# Patient Record
Sex: MALE | Race: WHITE | NOT HISPANIC OR LATINO | URBAN - METROPOLITAN AREA
[De-identification: names, ages, dates, MRNs, and addresses within clinical notes are randomized per-mention and may not be internally consistent; named-entity substitution may affect disease eponyms.]

---

## 2021-10-17 ENCOUNTER — INPATIENT (INPATIENT)
Facility: HOSPITAL | Age: 61
LOS: 16 days | Discharge: HOME | DRG: 3 | End: 2021-11-03
Attending: SURGERY | Admitting: SURGERY
Payer: MEDICAID

## 2021-10-17 VITALS
HEART RATE: 81 BPM | SYSTOLIC BLOOD PRESSURE: 128 MMHG | TEMPERATURE: 98 F | OXYGEN SATURATION: 98 % | RESPIRATION RATE: 19 BRPM | DIASTOLIC BLOOD PRESSURE: 69 MMHG

## 2021-10-17 DIAGNOSIS — I60.9 NONTRAUMATIC SUBARACHNOID HEMORRHAGE, UNSPECIFIED: ICD-10-CM

## 2021-10-17 LAB
ALBUMIN SERPL ELPH-MCNC: 4.2 G/DL — SIGNIFICANT CHANGE UP (ref 3.3–5.2)
ALP SERPL-CCNC: 83 U/L — SIGNIFICANT CHANGE UP (ref 40–120)
ALT FLD-CCNC: 39 U/L — SIGNIFICANT CHANGE UP
AMPHET UR-MCNC: NEGATIVE — SIGNIFICANT CHANGE UP
ANION GAP SERPL CALC-SCNC: 16 MMOL/L — SIGNIFICANT CHANGE UP (ref 5–17)
APPEARANCE UR: ABNORMAL
APTT BLD: 26 SEC — LOW (ref 27.5–35.5)
AST SERPL-CCNC: 36 U/L — SIGNIFICANT CHANGE UP
BACTERIA # UR AUTO: NEGATIVE — SIGNIFICANT CHANGE UP
BARBITURATES UR SCN-MCNC: NEGATIVE — SIGNIFICANT CHANGE UP
BASOPHILS # BLD AUTO: 0.03 K/UL — SIGNIFICANT CHANGE UP (ref 0–0.2)
BASOPHILS NFR BLD AUTO: 0.4 % — SIGNIFICANT CHANGE UP (ref 0–2)
BENZODIAZ UR-MCNC: NEGATIVE — SIGNIFICANT CHANGE UP
BILIRUB SERPL-MCNC: 0.3 MG/DL — LOW (ref 0.4–2)
BILIRUB UR-MCNC: NEGATIVE — SIGNIFICANT CHANGE UP
BLD GP AB SCN SERPL QL: SIGNIFICANT CHANGE UP
BUN SERPL-MCNC: 15.4 MG/DL — SIGNIFICANT CHANGE UP (ref 8–20)
CALCIUM SERPL-MCNC: 8.9 MG/DL — SIGNIFICANT CHANGE UP (ref 8.6–10.2)
CHLORIDE SERPL-SCNC: 103 MMOL/L — SIGNIFICANT CHANGE UP (ref 98–107)
CO2 SERPL-SCNC: 20 MMOL/L — LOW (ref 22–29)
COCAINE METAB.OTHER UR-MCNC: NEGATIVE — SIGNIFICANT CHANGE UP
COLOR SPEC: SIGNIFICANT CHANGE UP
CREAT SERPL-MCNC: 1.09 MG/DL — SIGNIFICANT CHANGE UP (ref 0.5–1.3)
DIFF PNL FLD: ABNORMAL
EOSINOPHIL # BLD AUTO: 0.19 K/UL — SIGNIFICANT CHANGE UP (ref 0–0.5)
EOSINOPHIL NFR BLD AUTO: 2.6 % — SIGNIFICANT CHANGE UP (ref 0–6)
EPI CELLS # UR: SIGNIFICANT CHANGE UP
ETHANOL SERPL-MCNC: <10 MG/DL — SIGNIFICANT CHANGE UP (ref 0–9)
GLUCOSE SERPL-MCNC: 172 MG/DL — HIGH (ref 70–99)
GLUCOSE UR QL: NEGATIVE MG/DL — SIGNIFICANT CHANGE UP
HCT VFR BLD CALC: 40.3 % — SIGNIFICANT CHANGE UP (ref 39–50)
HGB BLD-MCNC: 13.6 G/DL — SIGNIFICANT CHANGE UP (ref 13–17)
IMM GRANULOCYTES NFR BLD AUTO: 0.6 % — SIGNIFICANT CHANGE UP (ref 0–1.5)
INR BLD: 1.02 RATIO — SIGNIFICANT CHANGE UP (ref 0.88–1.16)
KETONES UR-MCNC: NEGATIVE — SIGNIFICANT CHANGE UP
LEUKOCYTE ESTERASE UR-ACNC: NEGATIVE — SIGNIFICANT CHANGE UP
LIDOCAIN IGE QN: 56 U/L — HIGH (ref 22–51)
LYMPHOCYTES # BLD AUTO: 2.35 K/UL — SIGNIFICANT CHANGE UP (ref 1–3.3)
LYMPHOCYTES # BLD AUTO: 32.7 % — SIGNIFICANT CHANGE UP (ref 13–44)
MCHC RBC-ENTMCNC: 29.6 PG — SIGNIFICANT CHANGE UP (ref 27–34)
MCHC RBC-ENTMCNC: 33.7 GM/DL — SIGNIFICANT CHANGE UP (ref 32–36)
MCV RBC AUTO: 87.8 FL — SIGNIFICANT CHANGE UP (ref 80–100)
METHADONE UR-MCNC: NEGATIVE — SIGNIFICANT CHANGE UP
MONOCYTES # BLD AUTO: 0.48 K/UL — SIGNIFICANT CHANGE UP (ref 0–0.9)
MONOCYTES NFR BLD AUTO: 6.7 % — SIGNIFICANT CHANGE UP (ref 2–14)
NEUTROPHILS # BLD AUTO: 4.1 K/UL — SIGNIFICANT CHANGE UP (ref 1.8–7.4)
NEUTROPHILS NFR BLD AUTO: 57 % — SIGNIFICANT CHANGE UP (ref 43–77)
NITRITE UR-MCNC: NEGATIVE — SIGNIFICANT CHANGE UP
OPIATES UR-MCNC: NEGATIVE — SIGNIFICANT CHANGE UP
PCP SPEC-MCNC: SIGNIFICANT CHANGE UP
PCP UR-MCNC: NEGATIVE — SIGNIFICANT CHANGE UP
PH UR: 8 — SIGNIFICANT CHANGE UP (ref 5–8)
PLATELET # BLD AUTO: 259 K/UL — SIGNIFICANT CHANGE UP (ref 150–400)
POTASSIUM SERPL-MCNC: 3.5 MMOL/L — SIGNIFICANT CHANGE UP (ref 3.5–5.3)
POTASSIUM SERPL-SCNC: 3.5 MMOL/L — SIGNIFICANT CHANGE UP (ref 3.5–5.3)
PROT SERPL-MCNC: 7.1 G/DL — SIGNIFICANT CHANGE UP (ref 6.6–8.7)
PROT UR-MCNC: 30 MG/DL
PROTHROM AB SERPL-ACNC: 11.8 SEC — SIGNIFICANT CHANGE UP (ref 10.6–13.6)
RBC # BLD: 4.59 M/UL — SIGNIFICANT CHANGE UP (ref 4.2–5.8)
RBC # FLD: 11.9 % — SIGNIFICANT CHANGE UP (ref 10.3–14.5)
RBC CASTS # UR COMP ASSIST: >50 /HPF (ref 0–4)
SARS-COV-2 RNA SPEC QL NAA+PROBE: SIGNIFICANT CHANGE UP
SODIUM SERPL-SCNC: 139 MMOL/L — SIGNIFICANT CHANGE UP (ref 135–145)
SP GR SPEC: 1.01 — SIGNIFICANT CHANGE UP (ref 1.01–1.02)
THC UR QL: NEGATIVE — SIGNIFICANT CHANGE UP
UROBILINOGEN FLD QL: NEGATIVE MG/DL — SIGNIFICANT CHANGE UP
WBC # BLD: 7.19 K/UL — SIGNIFICANT CHANGE UP (ref 3.8–10.5)
WBC # FLD AUTO: 7.19 K/UL — SIGNIFICANT CHANGE UP (ref 3.8–10.5)
WBC UR QL: SIGNIFICANT CHANGE UP

## 2021-10-17 PROCEDURE — 71260 CT THORAX DX C+: CPT | Mod: 26,MA

## 2021-10-17 PROCEDURE — 74177 CT ABD & PELVIS W/CONTRAST: CPT | Mod: 26,MA

## 2021-10-17 PROCEDURE — 70498 CT ANGIOGRAPHY NECK: CPT | Mod: 26

## 2021-10-17 PROCEDURE — 73100 X-RAY EXAM OF WRIST: CPT | Mod: 26,LT

## 2021-10-17 PROCEDURE — 99223 1ST HOSP IP/OBS HIGH 75: CPT

## 2021-10-17 PROCEDURE — 93010 ELECTROCARDIOGRAM REPORT: CPT

## 2021-10-17 PROCEDURE — 72125 CT NECK SPINE W/O DYE: CPT | Mod: 26,MA

## 2021-10-17 PROCEDURE — 73110 X-RAY EXAM OF WRIST: CPT | Mod: 26,50,77

## 2021-10-17 PROCEDURE — 99291 CRITICAL CARE FIRST HOUR: CPT

## 2021-10-17 PROCEDURE — 99222 1ST HOSP IP/OBS MODERATE 55: CPT

## 2021-10-17 PROCEDURE — 71045 X-RAY EXAM CHEST 1 VIEW: CPT | Mod: 26

## 2021-10-17 PROCEDURE — 73590 X-RAY EXAM OF LOWER LEG: CPT | Mod: 26,50

## 2021-10-17 PROCEDURE — 73130 X-RAY EXAM OF HAND: CPT | Mod: 26,LT

## 2021-10-17 PROCEDURE — 99222 1ST HOSP IP/OBS MODERATE 55: CPT | Mod: GC

## 2021-10-17 PROCEDURE — 73090 X-RAY EXAM OF FOREARM: CPT | Mod: 26,50

## 2021-10-17 PROCEDURE — 70450 CT HEAD/BRAIN W/O DYE: CPT | Mod: 26,59,MA

## 2021-10-17 PROCEDURE — 70450 CT HEAD/BRAIN W/O DYE: CPT | Mod: 26,77,59

## 2021-10-17 PROCEDURE — 73030 X-RAY EXAM OF SHOULDER: CPT | Mod: 26,LT

## 2021-10-17 PROCEDURE — 73110 X-RAY EXAM OF WRIST: CPT | Mod: 26,RT

## 2021-10-17 PROCEDURE — 73562 X-RAY EXAM OF KNEE 3: CPT | Mod: 26,50

## 2021-10-17 PROCEDURE — 73552 X-RAY EXAM OF FEMUR 2/>: CPT | Mod: 26,50

## 2021-10-17 PROCEDURE — 99221 1ST HOSP IP/OBS SF/LOW 40: CPT

## 2021-10-17 PROCEDURE — 70496 CT ANGIOGRAPHY HEAD: CPT | Mod: 26

## 2021-10-17 PROCEDURE — 70486 CT MAXILLOFACIAL W/O DYE: CPT | Mod: 26,MD

## 2021-10-17 PROCEDURE — 73120 X-RAY EXAM OF HAND: CPT | Mod: 26,LT,59

## 2021-10-17 RX ORDER — SODIUM CHLORIDE 9 MG/ML
1000 INJECTION INTRAMUSCULAR; INTRAVENOUS; SUBCUTANEOUS
Refills: 0 | Status: DISCONTINUED | OUTPATIENT
Start: 2021-10-17 | End: 2021-10-21

## 2021-10-17 RX ORDER — LEVETIRACETAM 250 MG/1
1000 TABLET, FILM COATED ORAL
Refills: 0 | Status: DISCONTINUED | OUTPATIENT
Start: 2021-10-17 | End: 2021-10-17

## 2021-10-17 RX ORDER — LEVETIRACETAM 250 MG/1
1000 TABLET, FILM COATED ORAL ONCE
Refills: 0 | Status: COMPLETED | OUTPATIENT
Start: 2021-10-17 | End: 2021-10-17

## 2021-10-17 RX ORDER — CEFAZOLIN SODIUM 1 G
2000 VIAL (EA) INJECTION ONCE
Refills: 0 | Status: COMPLETED | OUTPATIENT
Start: 2021-10-17 | End: 2021-10-17

## 2021-10-17 RX ORDER — ACETAMINOPHEN 500 MG
975 TABLET ORAL EVERY 6 HOURS
Refills: 0 | Status: DISCONTINUED | OUTPATIENT
Start: 2021-10-17 | End: 2021-10-17

## 2021-10-17 RX ORDER — DEXAMETHASONE 0.5 MG/5ML
5 ELIXIR ORAL EVERY 8 HOURS
Refills: 0 | Status: DISCONTINUED | OUTPATIENT
Start: 2021-10-17 | End: 2021-10-21

## 2021-10-17 RX ORDER — DEXAMETHASONE 0.5 MG/5ML
4 ELIXIR ORAL EVERY 8 HOURS
Refills: 0 | Status: DISCONTINUED | OUTPATIENT
Start: 2021-10-17 | End: 2021-10-17

## 2021-10-17 RX ORDER — ACETAMINOPHEN 500 MG
1000 TABLET ORAL ONCE
Refills: 0 | Status: COMPLETED | OUTPATIENT
Start: 2021-10-17 | End: 2021-10-17

## 2021-10-17 RX ORDER — CHLORHEXIDINE GLUCONATE 213 G/1000ML
1 SOLUTION TOPICAL
Refills: 0 | Status: DISCONTINUED | OUTPATIENT
Start: 2021-10-17 | End: 2021-10-19

## 2021-10-17 RX ORDER — HYDROMORPHONE HYDROCHLORIDE 2 MG/ML
0.5 INJECTION INTRAMUSCULAR; INTRAVENOUS; SUBCUTANEOUS ONCE
Refills: 0 | Status: DISCONTINUED | OUTPATIENT
Start: 2021-10-17 | End: 2021-10-17

## 2021-10-17 RX ORDER — LEVETIRACETAM 250 MG/1
1000 TABLET, FILM COATED ORAL EVERY 12 HOURS
Refills: 0 | Status: DISCONTINUED | OUTPATIENT
Start: 2021-10-17 | End: 2021-10-18

## 2021-10-17 RX ORDER — HYDROMORPHONE HYDROCHLORIDE 2 MG/ML
0.5 INJECTION INTRAMUSCULAR; INTRAVENOUS; SUBCUTANEOUS
Refills: 0 | Status: DISCONTINUED | OUTPATIENT
Start: 2021-10-17 | End: 2021-10-19

## 2021-10-17 RX ORDER — INFLUENZA VIRUS VACCINE 15; 15; 15; 15 UG/.5ML; UG/.5ML; UG/.5ML; UG/.5ML
0.5 SUSPENSION INTRAMUSCULAR ONCE
Refills: 0 | Status: COMPLETED | OUTPATIENT
Start: 2021-10-17 | End: 2021-10-17

## 2021-10-17 RX ADMIN — HYDROMORPHONE HYDROCHLORIDE 0.5 MILLIGRAM(S): 2 INJECTION INTRAMUSCULAR; INTRAVENOUS; SUBCUTANEOUS at 20:48

## 2021-10-17 RX ADMIN — LEVETIRACETAM 400 MILLIGRAM(S): 250 TABLET, FILM COATED ORAL at 18:03

## 2021-10-17 RX ADMIN — Medication 400 MILLIGRAM(S): at 23:19

## 2021-10-17 RX ADMIN — Medication 1000 MILLIGRAM(S): at 18:33

## 2021-10-17 RX ADMIN — HYDROMORPHONE HYDROCHLORIDE 0.5 MILLIGRAM(S): 2 INJECTION INTRAMUSCULAR; INTRAVENOUS; SUBCUTANEOUS at 21:19

## 2021-10-17 RX ADMIN — HYDROMORPHONE HYDROCHLORIDE 0.5 MILLIGRAM(S): 2 INJECTION INTRAMUSCULAR; INTRAVENOUS; SUBCUTANEOUS at 14:35

## 2021-10-17 RX ADMIN — Medication 400 MILLIGRAM(S): at 18:03

## 2021-10-17 RX ADMIN — HYDROMORPHONE HYDROCHLORIDE 0.5 MILLIGRAM(S): 2 INJECTION INTRAMUSCULAR; INTRAVENOUS; SUBCUTANEOUS at 17:21

## 2021-10-17 RX ADMIN — Medication 1000 MILLIGRAM(S): at 23:55

## 2021-10-17 RX ADMIN — SODIUM CHLORIDE 100 MILLILITER(S): 9 INJECTION INTRAMUSCULAR; INTRAVENOUS; SUBCUTANEOUS at 17:20

## 2021-10-17 RX ADMIN — Medication 100 MILLIGRAM(S): at 13:18

## 2021-10-17 RX ADMIN — HYDROMORPHONE HYDROCHLORIDE 0.5 MILLIGRAM(S): 2 INJECTION INTRAMUSCULAR; INTRAVENOUS; SUBCUTANEOUS at 18:36

## 2021-10-17 RX ADMIN — SODIUM CHLORIDE 100 MILLILITER(S): 9 INJECTION INTRAMUSCULAR; INTRAVENOUS; SUBCUTANEOUS at 20:46

## 2021-10-17 RX ADMIN — Medication 5 MILLIGRAM(S): at 22:38

## 2021-10-17 RX ADMIN — HYDROMORPHONE HYDROCHLORIDE 0.5 MILLIGRAM(S): 2 INJECTION INTRAMUSCULAR; INTRAVENOUS; SUBCUTANEOUS at 14:20

## 2021-10-17 NOTE — CONSULT NOTE ADULT - ATTENDING COMMENTS
Orthopaedic Trauma Surgeon Addendum:    I have personally performed a face-to-face diagnostic evaluation on this patient.  I have reviewed the physician assistant note and agree with the history, exam, and plan of care, except as noted.    Patient has PC/PW acetabular fracture with mild displacement and pre-existing arthritis.  Given current head injury, will wait to discuss operative or non-operative management pending medical optimization.  No need for emergent fixation, may treat non-op if risks outweigh benefits. Ortho will continue to follow.    Hadley Lozoya MD  Orthopaedic Trauma Surgeon  [facility] [place]

## 2021-10-17 NOTE — ED PROVIDER NOTE - CARE PLAN
1 Principal Discharge DX:	Subarachnoid hemorrhage  Secondary Diagnosis:	Wrist fracture, bilateral  Secondary Diagnosis:	Acetabular fracture

## 2021-10-17 NOTE — PATIENT PROFILE ADULT - OVER THE PAST TWO WEEKS, HAVE YOU FELT LITTLE INTEREST OR PLEASURE IN DOING THINGS?
Courtesy call.  Patient reports that he is feeling drastically better.  Patient claims he was feeling better after he left here Sunday evening after his injection.  Patient advised to finish taking antibiotics.  Verbalizes understanding.  
yes

## 2021-10-17 NOTE — H&P ADULT - HISTORY OF PRESENT ILLNESS
TRAUMA SURGERY H&P    Admitting surgical team: Trauma Surgery  Admitting attending: Dr. Alvarez  Patient seen and examined: 10/172/2021 1130  HPI:    Patient is a 61yM, no pmhx, BIBA as trauma B s/p fall from ladder in a tree , hemodynamically stable en route, saturating 93% therefor placed on 2 L NC. Upon arrival to trauma bay, patient GCS 15, HD stable, c/o head and b/l arm pain. Primary survey intact, secondary survery pertinent findings include laceration to left forehead, b/l wrist deformities, b/l knee pain. Patient required mouth suctioning for blood causing him to cough/spit up.   Denies cp, sob, n/v/d, abd pain.     PRIMARY SURVEY:  A: Airway intact   B:  bilateral air entry,  CTAB, trachea in midline  C: central and peripheral pulses intact bilaterally   D: GCS 15 (4 5 6)   E: exposed in a private room in the ED in order to fully examine any injuries     TRAUMA SURGERY H&P    Admitting surgical team: Trauma Surgery  Admitting attending: Dr. Alvarez  Patient seen and examined: 10/172/2021 1130  HPI:    Patient is a 61yM, unknown pmhx, BIBA as trauma B s/p fall from ladder in a tree approx 25 feet , hemodynamically stable en route, saturating 93% therefor placed on 2 L NC. Upon arrival to trauma bay, patient GCS 14, HD stable, c/o head and b/l arm pain. Primary survey intact, secondary survery pertinent findings include laceration to left forehead, b/l wrist deformities, b/l knee pain. Patient required mouth suctioning for blood causing him to cough/spit up.   Denies cp, sob, n/v/d, abd pain.     PRIMARY SURVEY:  A: Airway intact   B:  bilateral air entry,  CTAB, trachea in midline  C: central and peripheral pulses intact bilaterally   D: GCS 14    E: exposed in a private room in the ED in order to fully examine any injuries

## 2021-10-17 NOTE — H&P ADULT - ATTENDING COMMENTS
Agree with above assessment.  The patient was seen and examined by me.  The patient was brought in as a level B trauma after falling about 20 feet out of a tree.  The patient arrived hemodynamically stable with a GCS 14.  The patient denies LOC but reports hitting his head as he fell.  The patient was with pain the face and head and left arm and wrist, he also had pain at the right knee.  HEENT NC, abrasions to the frontal scalp and nose, there is a 3 cm frontal scalp laceration a 1.5 cm laceration to the philtrum, PERRL EOMI no raccoon eyes, no mitchell signs, trachea midline, no focal tenderness, cervical collar in place, chest with bilateral air entry, abdomen is soft, non distended, non tender, no guarding or rebound, no pelvic tenderness, there was tenderness to the right knee, left forearm and wrist with tenderness of the right forearm.  Head CT scan with traumatic SAH, nasal bone fracture, Leforte 2 fracture, there is left pulmonary contusion, stranding in posterior mediastinum consistent with hematoma, no evidence of thoracic aortic injury, there is an acetabular fracture. The patient is to be admitted to the trauma service in the SICU, neurosurgery evaluation neuro check Q1, ortho consultation, for CTA neck to exclude vertebral injury. Monitor H/H, incentive spirometer, plastic surgery evaluation for facial fractures.

## 2021-10-17 NOTE — CONSULT NOTE ADULT - CONSULT REASON
B/L distal radius fractures, Right posterior column acetabular fracture B/L distal radius fractures, Right posterior column/posterior wall acetabular fracture

## 2021-10-17 NOTE — CONSULT NOTE ADULT - ATTENDING COMMENTS
fall from tree    no neuro deficit    left parietooccipital SDH  left Sylvian fissure SAH  -serial neuro exams  -repeat CT head tomorrow    sphenoid fractures involving left carotid canal  possible left internal carotid artery injury in the carotid canal adjacent to petrous portion of temporal bone  -MRI/MRA brain/neck    bilateral LeFort III fractures  -plastic surgery consult    right distal radius fracture  left distal radius fracture  right posterior column / posterior wall acetabular fracture  -NWB RUE / LUE / RLE  -care as per ortho    posterior mediastinal hematoma  -no blunt thoracic aortic injury on CTA chest  -no thoracic spine fractures  -no pneumomediastinum  -no further evaluation indicated at this time fall from tree    no neuro deficit    left parietooccipital SDH  left Sylvian fissure SAH  -serial neuro exams  -repeat CT head tomorrow    sphenoid fractures involving left carotid canal  possible left internal carotid artery injury in the carotid canal adjacent to petrous portion of temporal bone  -MRI/MRA brain/neck when stable  -risk of antiplatelet and anticoagulants outweighs benefit in the setting of SDH/SAH    bilateral LeFort III fractures  -plastic surgery consult    right distal radius fracture  left distal radius fracture  right posterior column / posterior wall acetabular fracture  -NWB RUE / LUE / RLE  -care as per ortho    posterior mediastinal hematoma  -no blunt thoracic aortic injury on CTA chest  -no thoracic spine fractures  -no pneumomediastinum  -no further evaluation indicated at this time

## 2021-10-17 NOTE — CONSULT NOTE ADULT - SUBJECTIVE AND OBJECTIVE BOX
Pt Name: JOAN ESTRELLA    MRN: [id number]      Patient is a 61y old  Male who presents with a chief complaint of status post fall from tree (17 Oct 2021 11:53). patient seen at the bedside within 15 minutes of called consult. Patient seen with  Kayley. patient reports fall from ladder approximately 20 feet high while trimming hedges. Patient admits to pain to right hip and b/l wrists. Patient denies numbness or tingling.    No other orthopedic complaints.       REVIEW OF SYSTEMS    General: Alert, responsive. In c-collar    Skin: No rashes, no pruritis     Musculoskeletal: SEE HPI.    Neurological: No sensory or motor changes.         PAST MEDICAL & SURGICAL HISTORY:  PAST MEDICAL & SURGICAL HISTORY:  No pertinent past medical history        Allergies: Allergy Status Unknown      Medications: chlorhexidine 4% Liquid 1 Application(s) Topical <User Schedule>      FAMILY HISTORY:  : non-contributory    Social History:     Ambulation: Walking independently [x] With Cane [ ] With Walker [ ]  Bedbound [ ]                           13.6   7.19  )-----------( 259      ( 17 Oct 2021 11:50 )             40.3       10-17    139  |  103  |  15.4  ----------------------------<  172<H>  3.5   |  20.0<L>  |  1.09    Ca    8.9      17 Oct 2021 11:50    TPro  7.1  /  Alb  4.2  /  TBili  0.3<L>  /  DBili  x   /  AST  36  /  ALT  39  /  AlkPhos  83  10-17      Vital Signs Last 24 Hrs  T(C): 36.6 (17 Oct 2021 13:30), Max: 36.6 (17 Oct 2021 13:30)  T(F): 97.8 (17 Oct 2021 13:30), Max: 97.8 (17 Oct 2021 13:30)  HR: 72 (17 Oct 2021 15:00) (72 - 81)  BP: 122/70 (17 Oct 2021 15:00) (122/70 - 137/70)  BP(mean): --  RR: 17 (17 Oct 2021 15:00) (17 - 19)  SpO2: 97% (17 Oct 2021 15:00) (97% - 98%)    Daily     Daily       PHYSICAL EXAM:      Appearance: Alert, responsive in c collar.   Musculoskeletal:         Left Upper Extremity: Shoulder: +TTP, FROM. Abduction/adduction/flexion/extension intact. Elbow: NTTP, FROM. EE/EF intact. Wrist: +TTP, + swelling, minimal ROM secondary to pain. Hand: +TTP predominantly to 3rd/4th digits. +deformity to 3rd DIP and 4th PIP. decreased ROM of digits secondary to pain. + superficial abrasion to radial side wrist- cleaned and covered with xeroform/gauze Compartments soft compressible. Sensation intact to light touch. Rad pulse 2+. BCR       Right Upper Extremity: Shoulder: NTTP, FROM. Abduction/adduction/flexion/extension intact. Elbow: NTTP, FROM. EE/EF intact. Wrist:  Wrist: +TTP, + swelling, minimal ROM secondary to pain. Hand: NTTP throughout, FROM. Abduction/adduction/flexion/extension of digits 1-5 intact. Compartments soft compressible. Sensation intact to light touch.        Left Lower Extremity: Hip: NTTP, FROM. Knee: NTTP, FROM. KE/KF intact. Ankle: NTTP, FROM. DF/PF/EHL/FHL intact. Compartments soft compressible. Sensation intact to light touch. DP pulse 2+       Right Lower Extremity: Hip: +TTP decreased ROM secondary to right hip pain. Knee: +TTP, +effusion. Ankle: NTTP, FROM. DF/PF/EHL/FHL intact. Compartments soft compressible. Sensation intact to light touch. DP pulse 2+, BCR     Imaging Studies:  < from: CT Abdomen and Pelvis w/ IV Cont (10.17.21 @ 12:09) >     EXAM:  CT ABDOMEN AND PELVIS IC                         EXAM:  CT CHEST IC                          PROCEDURE DATE:  10/17/2021          INTERPRETATION:  CLINICAL INFORMATION: Trauma. Status post fall from tree.    COMPARISON: CT scan 6/26/2004    CONTRAST/COMPLICATIONS:  IV Contrast: Omnipaque 300  95 cc administered   5 cc discarded  Oral Contrast: NONE  Complications: None reported at time of study completion    PROCEDURE:  CT of the Chest, Abdomen and Pelvis was performed.  Imaging was performed through the chest in the arterial phase followed by imaging of the abdomen and pelvis in the portal venous phase.  Sagittal and coronal reformats were performed.    FINDINGS:  CHEST:  LUNGS AND LARGE AIRWAYS: Patent central airways. Small patchy airspace opacity at the left lung base small patchy airspace opacity at the left lung base may reflect contusion.  PLEURA: No pleural effusion. No pneumothorax.  VESSELS: Mild stranding of the periaortic,  periesophageal and periazygos fat, without evidence of active contrast extravasation.. No pneumomediastinum.  HEART: Heart size is normal. No pericardial effusion.  MEDIASTINUM AND YENY: No lymphadenopathy.  CHEST WALL AND LOWER NECK: Within normal limits.    ABDOMEN AND PELVIS:  LIVER: Withinnormal limits.  BILE DUCTS: Normal caliber.  GALLBLADDER: Cholecystectomy.  SPLEEN: Within normal limits.  PANCREAS: Within normal limits.  ADRENALS: Within normal limits.  KIDNEYS/URETERS: Within normal limits.    BLADDER: Diffuse thickening of the bladder wall.  REPRODUCTIVE ORGANS: Prostate within normal limits.    BOWEL: No bowel obstruction. Appendix is normal.  PERITONEUM: No ascites.  VESSELS: Within normal limits.  RETROPERITONEUM/LYMPH NODES: No lymphadenopathy.  ABDOMINAL WALL: Within normal limits.  BONES: Comminuted fracture of the right acetabulum with intramuscular hematoma in the right pelvic sidewall musculature..    IMPRESSION:  Stranding of the mediastinal fat adjacent to the aorta, esophagus, and azygos vein concerning for mediastinal hematoma. No evidence of active extravasation or acute aortic injury. Findings may reflect venous injury.    Small patchy airspace opacity at the left lung base may reflect small contusion.    Comminuted fracture of the right acetabulum.    These findings were discussed with Dr. Alvarez at 10/17/2021 1:12 PM  who communicates understanding.    --- End of Report ---            JO ANN BLACKWELL MD; Attending Radiologist  This document has been electronically signed. Oct 17 2021  1:17PM    < end of copied text >      A/P:  Pt is a  61y Male s/p fall found to have B/L distal radius fractures being managed by Dr. Webb and R acetabular fracture being managed by Dr. Lozoya     PLAN:   - pain control   - NWB RLE, LUE, RUE  - bedrest  - CT b/l wrist/hands ordered, CT right knee ordered  - rest of plan pending images results  - plastics/neuro team following  - case/images/plan d/w Dr. Webb   - OR later this week with Dr. Webb for open reduction internal fixation b/l Distal radius fractures  - reduction of b/l distal radius complete  - reduction 3rd digit DIP and 4th digit PIP d/l complete    FRACTURE REDUCTION  PROCEDURE NOTE: Fracture reduction     Performed by:  Irene Bruner PA-C    Indication: Acute fracture with displacement, requiring fracture reduction.    Consent: The risks and benefits of the procedure including incomplete reduction, nerve damage and bleeding were explained and the patient verbalized their understanding and wished to proceed with the procedure. Verbal consent was obtained following the discussion.    Universal Protocol: a time out was performed and the correct patient and site were verified     Procedure: Neurovascular exam intact prior to fracture reduction.  Skin exam : No bleeding or lacerations at the fracture site. Anesthesia/pain control, using aseptic technique, was administered using a hematoma block of 10 ml of 1% lidocaine to Right wrist. Reduction of the left/right wrists, left 3rd digit DIP d/l and left 4th digit PIP were accomplished via axial traction and careful manipulation. Following adequate reduction and alignment of the fractured bone, the fracture was immobilized with a  plaster splint. Distally, the extremity was neurovascular intact following the procedure.  The patient tolerated the procedure well.    Post reduction films obtained and demonstrated an adequate reduction.    Complications: None

## 2021-10-17 NOTE — CONSULT NOTE ADULT - SUBJECTIVE AND OBJECTIVE BOX
Neurosurgery PA-C  Consult note     Patient is a 61yM, no pmhx, BIBA as trauma B s/p fall from ladder in a tree , hemodynamically stable en route, saturating 93% therefor placed on 2 L NC. Upon arrival to trauma bay, patient GCS 15, HD stable, c/o head and b/l arm pain. Primary survey intact, secondary survery pertinent findings include laceration to left forehead, b/l wrist deformities, b/l knee pain. Patient required mouth suctioning for blood causing him to cough/spit up.   Denies cp, sob, n/v/d, abd pain.     PRIMARY SURVEY:  A: Airway intact   B:  bilateral air entry,  CTAB, trachea in midline  C: central and peripheral pulses intact bilaterally   D: GCS 15 (4 5 6)   E: exposed in a private room in the ED in order to fully examine any injuries      PMHX:   PSHX:   FAMILY HISTORY:      SOCIAL HISTORY:  Tobacco Use:  EtOH use:   Substance:    Allergies: Allergy Status Unknown      REVIEW OF SYSTEMS  CONSTITUTIONAL: No fever, weight loss, or fatigue  EYES: No eye pain, visual disturbances, or discharge  ENMT:  No difficulty hearing, tinnitus, vertigo; No sinus or throat pain  NECK: No pain or stiffness  RESPIRATORY: No cough, wheezing, chills or hemoptysis; No shortness of breath  CARDIOVASCULAR: No chest pain, palpitations, dizziness, or leg swelling  GASTROINTESTINAL: No abdominal or epigastric pain. No nausea, vomiting, or hematemesis; No diarrhea or constipation. No melena or hematochezia.  GENITOURINARY: No dysuria, frequency, hematuria, or incontinence  NEUROLOGICAL: No headaches, memory loss, loss of strength, numbness, or tremors  SKIN: No itching, burning, rashes, or lesions   LYMPH NODES: No enlarged glands  ENDOCRINE: No heat or cold intolerance; No hair loss  MUSCULOSKELETAL: No joint pain or swelling; No muscle, back, or extremity pain  PSYCHIATRIC: No depression, anxiety, mood swings, or difficulty sleeping  HEME/LYMPH: No easy bruising, or bleeding gums  ALLERG Y AND IMMUNOLOGIC: No hives or eczema    HOME MEDICATIONS:  Home Medications:    MEDICATIONS:  Antibiotics:  ceFAZolin   IVPB 2000 milliGRAM(s) IV Intermittent once      Vital Signs Last 24 Hrs  T(C): --  T(F): --  HR: --  BP: --  BP(mean): --  RR: --  SpO2: --      PHYSICAL EXAM:  GENERAL: NAD, well-groomed, well-developed  HEAD:  Atraumatic, normocephalic  WOUND: laceration forehead   ENMT: moist mucous membranes, good dentition, no lesions  NECK: Supple, no JVD, normal thyroid  ROMY COMA SCORE: E- V- M- =       E: 4= opens eyes spontaneously 3= to voice 2= to noxious 1= no opening       V: 5= oriented 4= confused 3= inappropriate words 2= incomprehensible sounds 1= nonverbal 1T= intubated       M: 6= follows commands 5= localizes 4= withdraws 3= flexor posturing 2= extensor posturing 1= no movement  MENTAL STATUS: A A O x3, Appropriately conversant, following simple commands   CRANIAL NERVES: PERRL. EOMI without nystagmus. Facial sensation intact V1-3 distribution b/l. Face symmetric w/ normal eye closure and smile, tongue midline. Hearing grossly intact. Speech clear. Head turning and shoulder shrug intact.   REFLEXES: No pronator drift; DTRs 2+ intact and symmetric; negative Hill's b/l; negative clonus b/l; no upper extremity dysmetria  MOTOR: strength 5/5 b/l upper and lower extremities  Uppers     Delt     Bicep     Tricep     HG  R                5/5       5/5         5/5         5/5  L                5/5       5/5         5/5         5/5  Lowers      HF     KF      KE     PF     DF     EHL  R             5/5     5/5     5/5    5/5   5/5    5/5  L              5/5    5/5      5/5    5/5   5/5    5/5  SENSATION: grossly intact to light touch all extremities  COORDINATION: Gait intact; rapid alternating movements intact b/l upper extremities; no upper extremity dysmetria  CHEST/LUNG: Clear to auscultation bilaterally; no rales, rhonchi, wheezing, or rubs  HEART: +S1/+S2; Regular rate and rhythm; no murmurs, rubs, or gallops  ABDOMEN: Soft, nontender, nondistended; bowel sounds present all four quadrants  EXTREMITIES:  2+ peripheral pulses, no clubbing, cyanosis, or edema  LYMPH: No lymphadenopathy noted  SKIN: Warm, dry; no rashes or lesions    RADIOLOGY & ADDITIONAL STUDIES:     Neurosurgery PA-C  Consult note     Patient is a 61 year old right hand dominant previously healthy male BIBA as trauma B status post fall from ladder in a tree, hemodynamically stable en route, saturating 93% therefore placed on 2 L NC. Upon arrival to trauma bay, patient GCS 15, HD stable, c/o head and b/l arm pain. Primary survey intact, secondary survey, pertinent findings include laceration to left forehead, bilateral wrist deformities, b/l knee pain. Patient required mouth suctioning for blood causing him to cough/spit up.   Patient denies chest pain, sob, n/v/d, headache.     PRIMARY SURVEY:  A: Airway intact   B:  bilateral air entry,  CTAB, trachea in midline  C: central and peripheral pulses intact bilaterally   D: GCS 15 (4 5 6)   E: exposed in a private room in the ED in order to fully examine any injuries      PMHX:   PSHX:   FAMILY HISTORY:      SOCIAL HISTORY:  Tobacco Use: denies   EtOH use: denies   Substance: denies     Allergies: NKDA     REVIEW OF SYSTEMS: not able to obtain     HOME MEDICATIONS:  Home Medications:    MEDICATIONS:  Antibiotics:  ceFAZolin   IVPB 2000 milliGRAM(s) IV Intermittent once      ICU Vital Signs Last 24 Hrs  T(C): --  T(F): --  HR: --  BP: --  BP(mean): --  ABP: --  ABP(mean): --  RR: --  SpO2: --      PHYSICAL EXAM:  GENERAL: NAD, well-developed  HEAD:  +traumatic   WOUND: laceration forehead   ENMT: bloody oral cavity   NECK: cervical collar   ROMY COMA SCORE: E- V- M- =15 to 14   MENTAL STATUS: A A O x3, following simple commands   CRANIAL NERVES: PERRL. EOMI without nystagmus. Facial sensation intact V1-3 distribution b/l. Face symmetric w/ normal eye closure and smile, tongue midline. Hearing grossly intact. Speech clear. Head turning and shoulder shrug intact.   REFLEXES: No pronator drift; DTRs 2+ intact and symmetric; negative Hill's b/l; negative clonus b/l; no upper extremity dysmetria  MOTOR: strength 5/5 b/l upper and lower extremities  Uppers     Delt     Bicep     Tricep     HG  R                5/5       5/5         5/5         5/5  L                5/5       5/5         5/5         5/5  Lowers      HF     KF      KE     PF     DF     EHL  R             5/5     5/5     5/5    5/5   5/5    5/5  L              5/5 5/5 5/5 5/5 5/5 5/5  SENSATION: grossly intact to light touch all extremities  COORDINATION: Gait intact; rapid alternating movements intact b/l upper extremities; no upper extremity dysmetria  CHEST/LUNG: Clear to auscultation bilaterally; no rales, rhonchi, wheezing, or rubs  HEART: +S1/+S2; Regular rate and rhythm; no murmurs, rubs, or gallops  ABDOMEN: Soft, nontender, nondistended; bowel sounds present all four quadrants  EXTREMITIES:  2+ peripheral pulses, no clubbing, cyanosis, or edema  LYMPH: No lymphadenopathy noted  SKIN: Warm, dry; no rashes or lesions    RADIOLOGY & ADDITIONAL STUDIES:     Neurosurgery PA-C  Consult note     Patient is a 61 year old right hand dominant previously healthy male BIBA as trauma B status post fall from ladder in a tree, hemodynamically stable en route, saturating 93% therefore placed on 2 L NC. Upon arrival to trauma bay, patient GCS 15, HD stable, c/o head and b/l arm pain. Primary survey intact, secondary survey, pertinent findings include laceration to left forehead, bilateral wrist deformities, b/l knee pain. Patient required mouth suctioning for blood causing him to cough/spit up.   Patient denies chest pain, sob, n/v/d, headache.     PRIMARY SURVEY:  A: Airway intact   B:  bilateral air entry,  CTAB, trachea in midline  C: central and peripheral pulses intact bilaterally   D: GCS 15 (4 5 6)   E: exposed in a private room in the ED in order to fully examine any injuries    SOCIAL HISTORY:  Tobacco Use: denies   EtOH use: denies   Substance: denies     Allergies: NKDA     MEDICATIONS:  Antibiotics:  ceFAZolin   IVPB 2000 milliGRAM(s) IV Intermittent once    ICU Vital Signs Last 24 Hrs  T(C): 36.6 (17 Oct 2021 13:30), Max: 36.6 (17 Oct 2021 13:30)  T(F): 97.8 (17 Oct 2021 13:30), Max: 97.8 (17 Oct 2021 13:30)  HR: 96 (17 Oct 2021 18:00) (72 - 96)  BP: 153/88 (17 Oct 2021 18:00) (115/72 - 153/88)  BP(mean): 105 (17 Oct 2021 18:00) (84 - 105)  RR: 21 (17 Oct 2021 18:00) (17 - 22)  SpO2: 100% (17 Oct 2021 18:00) (97% - 100%)      PHYSICAL EXAM:  GENERAL: NAD, well-developed  HEAD:  +traumatic   WOUND: laceration forehead   ENMT: bloody oral cavity   NECK: cervical collar   ROMY COMA SCORE: E- V- M- =15 to 14   MENTAL STATUS: A A O x3, following simple commands   CRANIAL NERVES: PERRL. EOMI without nystagmus. Facial sensation intact V1-3 distribution b/l. Face symmetric w/ normal eye closure and smile, tongue midline. Hearing grossly intact. Speech clear. Head turning and shoulder shrug intact.   REFLEXES: No pronator drift; DTRs 2+ intact and symmetric; negative Hill's b/l; negative clonus b/l; no upper extremity dysmetria  MOTOR: right upper extremity in a cast   SENSATION: grossly intact to light touch all extremities  HEART: +S1/+S2      RADIOLOGY & ADDITIONAL STUDIES:         Neurosurgery PA-C  Consult note     Patient is a 61 year old right hand dominant previously healthy male BIBA as trauma B status post fall from ladder in a tree, hemodynamically stable en route, saturating 93% therefore placed on 2 L NC. Upon arrival to trauma bay, patient GCS 15, HD stable, c/o head and b/l arm pain. Primary survey intact, secondary survey, pertinent findings include laceration to left forehead, bilateral wrist deformities, b/l knee pain. Patient required mouth suctioning for blood causing him to cough/spit up.   Patient denies chest pain, sob, n/v/d, headache.     PRIMARY SURVEY:  A: Airway intact   B:  bilateral air entry,  CTAB, trachea in midline  C: central and peripheral pulses intact bilaterally   D: GCS 15 (4 5 6)   E: exposed in a private room in the ED in order to fully examine any injuries    SOCIAL HISTORY:  Tobacco Use: denies   EtOH use: denies   Substance: denies     Allergies: NKDA     MEDICATIONS:  Antibiotics:  ceFAZolin   IVPB 2000 milliGRAM(s) IV Intermittent once    ICU Vital Signs Last 24 Hrs  T(C): 36.6 (17 Oct 2021 13:30), Max: 36.6 (17 Oct 2021 13:30)  T(F): 97.8 (17 Oct 2021 13:30), Max: 97.8 (17 Oct 2021 13:30)  HR: 96 (17 Oct 2021 18:00) (72 - 96)  BP: 153/88 (17 Oct 2021 18:00) (115/72 - 153/88)  BP(mean): 105 (17 Oct 2021 18:00) (84 - 105)  RR: 21 (17 Oct 2021 18:00) (17 - 22)  SpO2: 100% (17 Oct 2021 18:00) (97% - 100%)      PHYSICAL EXAM:  GENERAL: NAD, in a cervical collar   HEAD:  +traumatic   WOUND: laceration forehead   ENMT: bloody oral cavity   NECK: cervical collar   ROMY COMA SCORE: E- V- M- =15 to 14   MENTAL STATUS: A A O x3, following simple commands   CRANIAL NERVES: PERRL. EOMI without nystagmus. Tongue midline. Hearing grossly intact. Speech clear.   MOTOR: right upper extremity in a cast moves, moves left upper extremity, move left lower extremity,   SENSATION: grossly intact to light touch all extremities  HEART: +S1/+S2      RADIOLOGY & ADDITIONAL STUDIES:  EXAM:  CT BRAIN                        PROCEDURE DATE:  10/17/2021      INTERPRETATION:  CLINICAL INDICATION: Follow-up trauma, subarachnoid hemorrhage and subdural hemorrhage    5mm axial sections of the brain were obtained from base to vertex, without the intravenous administration of contrast material. Coronal and sagittal computer generated reconstructed views are available.    Comparison is made with the prior CT of 11:46 AM.    There is slightly more hemorrhage in the inferior right frontal lobe just above the ethmoid skull base fracture compared with the prior examination. Traumatic subarachnoid hemorrhage is similar with hemorrhage in the anterior hemispheric fissure, the suprasellar cistern and the left sylvian fissure. Traumatic left and right posterior frontal parietal subarachnoid hemorrhage is identified as well as bilateral parieto-occipital subdural hematomas. Skull fractures are unchanged.      IMPRESSION: Slightly increased frontal parenchymal hemorrhage just above the cribriform plate compared with 11:46 AM. Otherwise no change in traumatic subarachnoid and subdural hemorrhage.        EXAM:  CT ANGIO NECK (W)AW IC                        EXAM:  CT ANGIO BRAIN (W)AW IC                        PROCEDURE DATE:  10/17/2021    INTERPRETATION:  CT ANGIOGRAM NECK AND BRAIN WITH CONTRAST  COMPARISON: No prior studies have been submitted for comparison.    CLINICAL INFORMATION: Patient with complex facial fractures and fracture of the skull base with subarachnoid hemorrhage, concern for vascular dissection.    TECHNIQUE: Volumetric acquisition was performed from the thoracic inlet to the vertex.  A total of 70 cc of Omnipaque 350 was injected intravenously without complications. 30 cc of intravenous contrast was discarded. Dose optimization techniques were utilized including kVp/mA modulation along with iterative reconstructions.  MIP image analysis was performed on a dedicated workstation.    FINDINGS:    NECK:  The aortic arch is conventional in anatomy. Origin of supraaortic arteries are patent. The common carotid arteries are normal in course and caliber. There are atherosclerotic plaques at common carotid bifurcations and carotid bulbs without evidence of significant segmental stenosis.    The right internal carotid artery is normal in caliber. There is 0 % stenosis using NASCET criteria (normal right ICA caliber is 5.0 mm).    The left internal carotid artery is normal in caliber. There is 0 % stenosis using NASCET criteria (normal left ICA caliber is 5.0 mm).    Bilateral vertebral arteries are normal in course, caliber and contour, without evidence of dissection or occlusion.    Degenerative changes of the bony cervical spine are noted.    BRAIN:  Vertical hypo densities are seen in the left internal carotid artery in the petrous portion, series 5 image 227, difficult to assess at the skull base which may be due to streak from the petrous bone. The petrocavernous and supraclinoid ICA segments are normal in caliber.  The visualized MCA and KAYLAH branches are normal without evidence of stenosis or aneurysm. The ACOM is present. Bilateral posterior communicating arteries are present.  The vertebral arteries, visualized cerebellar arteries, basilar and bilateral posterior cerebral arteries are patent without evidence of stenoses or aneurysm.    Other findings: None.    IMPRESSION:    1. CTA brain: There is no large vessel occlusion or aneurysm involving major proximal intracranial arteries.    2. CTA NECK: Hypodensities in the left internal carotid artery in the petrous portion of the left temporal bone, for which multifocal dissection is difficult to exclude secondary to adjacent streak artifact. Further correlation with MRI of neck is suggested including T1 fat saturated images.          EXAM:  CT MAXILLOFACIAL                        EXAM:  CT CERVICAL SPINE                        EXAM:  CT BRAIN                        PROCEDURE DATE:  10/17/2021    INTERPRETATION:  CT OF THE HEAD, MAXILLOFACIAL AND CERVICAL SPINE CT WITHOUT CONTRAST.  CLINICAL INDICATION: Trauma    TECHNIQUE: Volumetric CT acquisition was performed through the brain and reviewed using brain and bone window technique. Axial noncontrast CT scans of maxillofacial region were performed. Sagittal and coronal reconstructions were obtained for both sets of images. Volumetric axial noncontrast images of the cervical spine were reconstructed in the axial, coronal and sagittal planes.  Dose optimization techniques were utilized including kVp/mA modulation along with iterative reconstructions.  3-D/MIPS images were obtained on a different workstation, reviewed and saved in PACS.    COMPARISON: No prior studies have been submitted for comparison.    FINDINGS:  Head CT:    There is an acute skull fracture involving the inner and outer plates of the right frontal sinus and left skull base involving the left sphenoid sinus, base of greater wing. There is subarachnoid hemorrhage involving the basal cisterns, interhemispheric and left sylvian system and to a lesser extent the right sylvian cistern.    There is also an acute subdural hemorrhage involving the left parietotemporal convexity measuring 6 mm in thickness. There is 3 mm thickness acute subdural hemorrhage layering along the right falx cerebelli. There is no midline shift or herniation. The gray-white matter junction is well-preserved.    The ventricles, cisterns and sulci are normal in size, shape and configuration.    The visualized globes are symmetric bilaterally. There is left tympanomastoid effusion in the hypotympanum for which an acute nondisplaced fracture of the left temporal bone cannot be entirely excluded.      Maxillofacial CT:    There is an acute pyramidal fracture involving the medial aspects of the maxillary bone, on the left extending through and through the left antrum and in the right side to involve the anterior wall of the maxillary antrum and lateral pterygoid plate, consistent with LeFort II fracture.    There is an acute fracture coursing horizontally through the frontal bone to include the outer and inner plates of the right frontal sinus with inspissated hemorrhagic secretions. The inner plate fracture on the right is mildly displaced and on the left side is nondisplaced.    There are acute displaced fractures of the nasal bones bilaterally and of the nasal septum. The right side of the acute displaced fracture of the nasal bone is angulated. There are acute fractures of the lamina papyracea bilaterally. There is also an acute fracture of thesuperior wall of the right orbit.    The acute fracture involving the medial wall of the right maxillary bone traverses through the nasolacrimal apparatus which is mildly displaced in the anterior wall. The acute fracture through the medial wall of the left maxillary sinus traverses the nasolacrimal apparatus anterior wall without displacement.    There is an acute fracture of the fovea ethmoidalis particularly on the right for which CSF leak should be considered.    An acute fracture is noted involving the left sphenoid bone, particularly involving the carotid canal on the left. There is a small amount of pneumocephalus in the prepontine cistern.    The visualized globes are symmetric bilaterally and the lenses are normally situated. There is no preseptal edema or retrobulbar hematoma. The intraconal/extraconal fat, extraocular muscles and optic nerve sheaths are intact.      CT cervical spine:    There is maintenance of the usual cervical lordosis without fracture or malalignment. The pre and paravertebral soft tissues are within the limits of normal.    The spinal canal is adequately patent and there is no significant neural foraminal narrowing.        IMPRESSION:    There is an acute fracture involving the frontal bone which traverse the frontal sinus as detailed with subarachnoid hemorrhage in the basal cisterns and acute subdural hemorrhages as described.    There is no midline shift or herniation.    There is an acute right LeFort fracture II. Complex nasoethmoidal orbital fractures as described in details above.    The skull base fracture involving the left sphenoid bone traverses through the anterior wall of the carotid canal and left clinoid processes, therefore CT angiography of the brain is recommended to exclude vascular dissection.    Acute fracture of the left fovea ethmoidalis ethmoidalis for which CSF leak cannot be entirely excluded.    No cervical spine fracture.      There is no stenosis involving major neck arteries.    NASCET CAROTID STENOSIS CRITERIA (distal normal appearing ICA as denominator for measurement): 0%-none, 1-49%-mild, 50-70%-moderate, 70-89%-severe, 90-99%-critical.         Neurosurgery PA-C  Consult note     Patient is a 61 year old right hand dominant previously healthy male BIBA as trauma B status post fall from ladder in a tree, hemodynamically stable en route, saturating 93% therefore placed on 2 L NC. Upon arrival to trauma bay, patient GCS 15, HD stable, c/o head and b/l arm pain. Primary survey intact, secondary survey, pertinent findings include laceration to left forehead, bilateral wrist deformities, b/l knee pain. Patient required mouth suctioning for blood causing him to cough/spit up.   Patient denies chest pain, sob, n/v/d, headache.     PRIMARY SURVEY:  A: Airway intact   B:  bilateral air entry,  CTAB, trachea in midline  C: central and peripheral pulses intact bilaterally   D: GCS 15 (4 5 6)   E: exposed in a private room in the ED in order to fully examine any injuries    SOCIAL HISTORY:  Tobacco Use: denies   EtOH use: denies   Substance: denies     Allergies: NKDA     MEDICATIONS:  Antibiotics:  ceFAZolin   IVPB 2000 milliGRAM(s) IV Intermittent once    ICU Vital Signs Last 24 Hrs  T(C): 36.6 (17 Oct 2021 13:30), Max: 36.6 (17 Oct 2021 13:30)  T(F): 97.8 (17 Oct 2021 13:30), Max: 97.8 (17 Oct 2021 13:30)  HR: 96 (17 Oct 2021 18:00) (72 - 96)  BP: 153/88 (17 Oct 2021 18:00) (115/72 - 153/88)  BP(mean): 105 (17 Oct 2021 18:00) (84 - 105)  RR: 21 (17 Oct 2021 18:00) (17 - 22)  SpO2: 100% (17 Oct 2021 18:00) (97% - 100%)      PHYSICAL EXAM:  GENERAL: NAD, in a cervical collar   HEAD:  +traumatic   WOUND: laceration forehead   ENMT: bloody oral cavity, facial swelling   NECK: cervical collar   ROMY COMA SCORE: E- V- M- =15 to 14   MENTAL STATUS: A A O x3, following simple commands   CRANIAL NERVES: PERRL. EOMI without nystagmus. Tongue midline. Hearing grossly intact.   MOTOR: right upper extremity in a cast moves, moves left upper extremity, move left lower extremity,   SENSATION: grossly intact to light touch all extremities  HEART: +S1/+S2      RADIOLOGY & ADDITIONAL STUDIES:  EXAM:  CT BRAIN                        PROCEDURE DATE:  10/17/2021      INTERPRETATION:  CLINICAL INDICATION: Follow-up trauma, subarachnoid hemorrhage and subdural hemorrhage    5mm axial sections of the brain were obtained from base to vertex, without the intravenous administration of contrast material. Coronal and sagittal computer generated reconstructed views are available.    Comparison is made with the prior CT of 11:46 AM.    There is slightly more hemorrhage in the inferior right frontal lobe just above the ethmoid skull base fracture compared with the prior examination. Traumatic subarachnoid hemorrhage is similar with hemorrhage in the anterior hemispheric fissure, the suprasellar cistern and the left sylvian fissure. Traumatic left and right posterior frontal parietal subarachnoid hemorrhage is identified as well as bilateral parieto-occipital subdural hematomas. Skull fractures are unchanged.      IMPRESSION: Slightly increased frontal parenchymal hemorrhage just above the cribriform plate compared with 11:46 AM. Otherwise no change in traumatic subarachnoid and subdural hemorrhage.        EXAM:  CT ANGIO NECK (W)AW IC                        EXAM:  CT ANGIO BRAIN (W)AW IC                        PROCEDURE DATE:  10/17/2021    INTERPRETATION:  CT ANGIOGRAM NECK AND BRAIN WITH CONTRAST  COMPARISON: No prior studies have been submitted for comparison.    CLINICAL INFORMATION: Patient with complex facial fractures and fracture of the skull base with subarachnoid hemorrhage, concern for vascular dissection.    TECHNIQUE: Volumetric acquisition was performed from the thoracic inlet to the vertex.  A total of 70 cc of Omnipaque 350 was injected intravenously without complications. 30 cc of intravenous contrast was discarded. Dose optimization techniques were utilized including kVp/mA modulation along with iterative reconstructions.  MIP image analysis was performed on a dedicated workstation.    FINDINGS:    NECK:  The aortic arch is conventional in anatomy. Origin of supraaortic arteries are patent. The common carotid arteries are normal in course and caliber. There are atherosclerotic plaques at common carotid bifurcations and carotid bulbs without evidence of significant segmental stenosis.    The right internal carotid artery is normal in caliber. There is 0 % stenosis using NASCET criteria (normal right ICA caliber is 5.0 mm).    The left internal carotid artery is normal in caliber. There is 0 % stenosis using NASCET criteria (normal left ICA caliber is 5.0 mm).    Bilateral vertebral arteries are normal in course, caliber and contour, without evidence of dissection or occlusion.    Degenerative changes of the bony cervical spine are noted.    BRAIN:  Vertical hypo densities are seen in the left internal carotid artery in the petrous portion, series 5 image 227, difficult to assess at the skull base which may be due to streak from the petrous bone. The petrocavernous and supraclinoid ICA segments are normal in caliber.  The visualized MCA and KAYLAH branches are normal without evidence of stenosis or aneurysm. The ACOM is present. Bilateral posterior communicating arteries are present.  The vertebral arteries, visualized cerebellar arteries, basilar and bilateral posterior cerebral arteries are patent without evidence of stenoses or aneurysm.    Other findings: None.    IMPRESSION:    1. CTA brain: There is no large vessel occlusion or aneurysm involving major proximal intracranial arteries.    2. CTA NECK: Hypodensities in the left internal carotid artery in the petrous portion of the left temporal bone, for which multifocal dissection is difficult to exclude secondary to adjacent streak artifact. Further correlation with MRI of neck is suggested including T1 fat saturated images.          EXAM:  CT MAXILLOFACIAL                        EXAM:  CT CERVICAL SPINE                        EXAM:  CT BRAIN                        PROCEDURE DATE:  10/17/2021    INTERPRETATION:  CT OF THE HEAD, MAXILLOFACIAL AND CERVICAL SPINE CT WITHOUT CONTRAST.  CLINICAL INDICATION: Trauma    TECHNIQUE: Volumetric CT acquisition was performed through the brain and reviewed using brain and bone window technique. Axial noncontrast CT scans of maxillofacial region were performed. Sagittal and coronal reconstructions were obtained for both sets of images. Volumetric axial noncontrast images of the cervical spine were reconstructed in the axial, coronal and sagittal planes.  Dose optimization techniques were utilized including kVp/mA modulation along with iterative reconstructions.  3-D/MIPS images were obtained on a different workstation, reviewed and saved in PACS.    COMPARISON: No prior studies have been submitted for comparison.    FINDINGS:  Head CT:    There is an acute skull fracture involving the inner and outer plates of the right frontal sinus and left skull base involving the left sphenoid sinus, base of greater wing. There is subarachnoid hemorrhage involving the basal cisterns, interhemispheric and left sylvian system and to a lesser extent the right sylvian cistern.    There is also an acute subdural hemorrhage involving the left parietotemporal convexity measuring 6 mm in thickness. There is 3 mm thickness acute subdural hemorrhage layering along the right falx cerebelli. There is no midline shift or herniation. The gray-white matter junction is well-preserved.    The ventricles, cisterns and sulci are normal in size, shape and configuration.    The visualized globes are symmetric bilaterally. There is left tympanomastoid effusion in the hypotympanum for which an acute nondisplaced fracture of the left temporal bone cannot be entirely excluded.      Maxillofacial CT:    There is an acute pyramidal fracture involving the medial aspects of the maxillary bone, on the left extending through and through the left antrum and in the right side to involve the anterior wall of the maxillary antrum and lateral pterygoid plate, consistent with LeFort II fracture.    There is an acute fracture coursing horizontally through the frontal bone to include the outer and inner plates of the right frontal sinus with inspissated hemorrhagic secretions. The inner plate fracture on the right is mildly displaced and on the left side is nondisplaced.    There are acute displaced fractures of the nasal bones bilaterally and of the nasal septum. The right side of the acute displaced fracture of the nasal bone is angulated. There are acute fractures of the lamina papyracea bilaterally. There is also an acute fracture of thesuperior wall of the right orbit.    The acute fracture involving the medial wall of the right maxillary bone traverses through the nasolacrimal apparatus which is mildly displaced in the anterior wall. The acute fracture through the medial wall of the left maxillary sinus traverses the nasolacrimal apparatus anterior wall without displacement.    There is an acute fracture of the fovea ethmoidalis particularly on the right for which CSF leak should be considered.    An acute fracture is noted involving the left sphenoid bone, particularly involving the carotid canal on the left. There is a small amount of pneumocephalus in the prepontine cistern.    The visualized globes are symmetric bilaterally and the lenses are normally situated. There is no preseptal edema or retrobulbar hematoma. The intraconal/extraconal fat, extraocular muscles and optic nerve sheaths are intact.      CT cervical spine:    There is maintenance of the usual cervical lordosis without fracture or malalignment. The pre and paravertebral soft tissues are within the limits of normal.    The spinal canal is adequately patent and there is no significant neural foraminal narrowing.        IMPRESSION:    There is an acute fracture involving the frontal bone which traverse the frontal sinus as detailed with subarachnoid hemorrhage in the basal cisterns and acute subdural hemorrhages as described.    There is no midline shift or herniation.    There is an acute right LeFort fracture II. Complex nasoethmoidal orbital fractures as described in details above.    The skull base fracture involving the left sphenoid bone traverses through the anterior wall of the carotid canal and left clinoid processes, therefore CT angiography of the brain is recommended to exclude vascular dissection.    Acute fracture of the left fovea ethmoidalis ethmoidalis for which CSF leak cannot be entirely excluded.    No cervical spine fracture.      There is no stenosis involving major neck arteries.    NASCET CAROTID STENOSIS CRITERIA (distal normal appearing ICA as denominator for measurement): 0%-none, 1-49%-mild, 50-70%-moderate, 70-89%-severe, 90-99%-critical.         Neurosurgery PA-C  Consult note     Patient is a 61 year old right hand dominant previously healthy male BIBA as trauma B status post fall from ladder in a tree, while cutting branches, hemodynamically stable en route, saturating 93% therefore placed on 2 L NC. Upon arrival to trauma bay, patient GCS 15, HD stable, c/o head and b/l arm pain. Primary survey intact, secondary survey, pertinent findings include laceration to left forehead, bilateral wrist deformities, b/l knee pain. Patient required mouth suctioning for blood causing him to cough/spit up.   Patient denies chest pain, sob, n/v/d, headache. Patient was complaining of right lower extremity pain.     PRIMARY SURVEY:  A: Airway intact   B:  bilateral air entry,  CTAB, trachea in midline  C: central and peripheral pulses intact bilaterally   D: GCS 15 (4 5 6)   E: exposed in a private room in the ED in order to fully examine any injuries    SOCIAL HISTORY:  Tobacco Use: denies   EtOH use: denies   Substance: denies     Allergies: NKDA     MEDICATIONS:  Antibiotics:  ceFAZolin   IVPB 2000 milliGRAM(s) IV Intermittent once    ICU Vital Signs Last 24 Hrs  T(C): 36.6 (17 Oct 2021 13:30), Max: 36.6 (17 Oct 2021 13:30)  T(F): 97.8 (17 Oct 2021 13:30), Max: 97.8 (17 Oct 2021 13:30)  HR: 96 (17 Oct 2021 18:00) (72 - 96)  BP: 153/88 (17 Oct 2021 18:00) (115/72 - 153/88)  BP(mean): 105 (17 Oct 2021 18:00) (84 - 105)  RR: 21 (17 Oct 2021 18:00) (17 - 22)  SpO2: 100% (17 Oct 2021 18:00) (97% - 100%)      PHYSICAL EXAM:  GENERAL: NAD, in a cervical collar   HEAD:  +traumatic   WOUND: laceration forehead   ENMT: bloody oral cavity, facial swelling   NECK: cervical collar   ROMY COMA SCORE: E- V- M- =15 to 14   MENTAL STATUS: A A O x3, following simple commands   CRANIAL NERVES: PERRL. EOMI without nystagmus. Tongue midline. Hearing grossly intact.   MOTOR: right upper extremity in a cast moves, moves left upper extremity, move left lower extremity,   SENSATION: grossly intact to light touch all extremities  HEART: +S1/+S2      RADIOLOGY & ADDITIONAL STUDIES:  EXAM:  CT BRAIN                        PROCEDURE DATE:  10/17/2021      INTERPRETATION:  CLINICAL INDICATION: Follow-up trauma, subarachnoid hemorrhage and subdural hemorrhage    5mm axial sections of the brain were obtained from base to vertex, without the intravenous administration of contrast material. Coronal and sagittal computer generated reconstructed views are available.    Comparison is made with the prior CT of 11:46 AM.    There is slightly more hemorrhage in the inferior right frontal lobe just above the ethmoid skull base fracture compared with the prior examination. Traumatic subarachnoid hemorrhage is similar with hemorrhage in the anterior hemispheric fissure, the suprasellar cistern and the left sylvian fissure. Traumatic left and right posterior frontal parietal subarachnoid hemorrhage is identified as well as bilateral parieto-occipital subdural hematomas. Skull fractures are unchanged.      IMPRESSION: Slightly increased frontal parenchymal hemorrhage just above the cribriform plate compared with 11:46 AM. Otherwise no change in traumatic subarachnoid and subdural hemorrhage.        EXAM:  CT ANGIO NECK (W)AW IC                        EXAM:  CT ANGIO BRAIN (W)AW IC                        PROCEDURE DATE:  10/17/2021    INTERPRETATION:  CT ANGIOGRAM NECK AND BRAIN WITH CONTRAST  COMPARISON: No prior studies have been submitted for comparison.    CLINICAL INFORMATION: Patient with complex facial fractures and fracture of the skull base with subarachnoid hemorrhage, concern for vascular dissection.    TECHNIQUE: Volumetric acquisition was performed from the thoracic inlet to the vertex.  A total of 70 cc of Omnipaque 350 was injected intravenously without complications. 30 cc of intravenous contrast was discarded. Dose optimization techniques were utilized including kVp/mA modulation along with iterative reconstructions.  MIP image analysis was performed on a dedicated workstation.    FINDINGS:    NECK:  The aortic arch is conventional in anatomy. Origin of supraaortic arteries are patent. The common carotid arteries are normal in course and caliber. There are atherosclerotic plaques at common carotid bifurcations and carotid bulbs without evidence of significant segmental stenosis.    The right internal carotid artery is normal in caliber. There is 0 % stenosis using NASCET criteria (normal right ICA caliber is 5.0 mm).    The left internal carotid artery is normal in caliber. There is 0 % stenosis using NASCET criteria (normal left ICA caliber is 5.0 mm).    Bilateral vertebral arteries are normal in course, caliber and contour, without evidence of dissection or occlusion.    Degenerative changes of the bony cervical spine are noted.    BRAIN:  Vertical hypo densities are seen in the left internal carotid artery in the petrous portion, series 5 image 227, difficult to assess at the skull base which may be due to streak from the petrous bone. The petrocavernous and supraclinoid ICA segments are normal in caliber.  The visualized MCA and KAYLAH branches are normal without evidence of stenosis or aneurysm. The ACOM is present. Bilateral posterior communicating arteries are present.  The vertebral arteries, visualized cerebellar arteries, basilar and bilateral posterior cerebral arteries are patent without evidence of stenoses or aneurysm.    Other findings: None.    IMPRESSION:    1. CTA brain: There is no large vessel occlusion or aneurysm involving major proximal intracranial arteries.    2. CTA NECK: Hypodensities in the left internal carotid artery in the petrous portion of the left temporal bone, for which multifocal dissection is difficult to exclude secondary to adjacent streak artifact. Further correlation with MRI of neck is suggested including T1 fat saturated images.          EXAM:  CT MAXILLOFACIAL                        EXAM:  CT CERVICAL SPINE                        EXAM:  CT BRAIN                        PROCEDURE DATE:  10/17/2021    INTERPRETATION:  CT OF THE HEAD, MAXILLOFACIAL AND CERVICAL SPINE CT WITHOUT CONTRAST.  CLINICAL INDICATION: Trauma    TECHNIQUE: Volumetric CT acquisition was performed through the brain and reviewed using brain and bone window technique. Axial noncontrast CT scans of maxillofacial region were performed. Sagittal and coronal reconstructions were obtained for both sets of images. Volumetric axial noncontrast images of the cervical spine were reconstructed in the axial, coronal and sagittal planes.  Dose optimization techniques were utilized including kVp/mA modulation along with iterative reconstructions.  3-D/MIPS images were obtained on a different workstation, reviewed and saved in PACS.    COMPARISON: No prior studies have been submitted for comparison.    FINDINGS:  Head CT:    There is an acute skull fracture involving the inner and outer plates of the right frontal sinus and left skull base involving the left sphenoid sinus, base of greater wing. There is subarachnoid hemorrhage involving the basal cisterns, interhemispheric and left sylvian system and to a lesser extent the right sylvian cistern.    There is also an acute subdural hemorrhage involving the left parietotemporal convexity measuring 6 mm in thickness. There is 3 mm thickness acute subdural hemorrhage layering along the right falx cerebelli. There is no midline shift or herniation. The gray-white matter junction is well-preserved.    The ventricles, cisterns and sulci are normal in size, shape and configuration.    The visualized globes are symmetric bilaterally. There is left tympanomastoid effusion in the hypotympanum for which an acute nondisplaced fracture of the left temporal bone cannot be entirely excluded.      Maxillofacial CT:    There is an acute pyramidal fracture involving the medial aspects of the maxillary bone, on the left extending through and through the left antrum and in the right side to involve the anterior wall of the maxillary antrum and lateral pterygoid plate, consistent with LeFort II fracture.    There is an acute fracture coursing horizontally through the frontal bone to include the outer and inner plates of the right frontal sinus with inspissated hemorrhagic secretions. The inner plate fracture on the right is mildly displaced and on the left side is nondisplaced.    There are acute displaced fractures of the nasal bones bilaterally and of the nasal septum. The right side of the acute displaced fracture of the nasal bone is angulated. There are acute fractures of the lamina papyracea bilaterally. There is also an acute fracture of thesuperior wall of the right orbit.    The acute fracture involving the medial wall of the right maxillary bone traverses through the nasolacrimal apparatus which is mildly displaced in the anterior wall. The acute fracture through the medial wall of the left maxillary sinus traverses the nasolacrimal apparatus anterior wall without displacement.    There is an acute fracture of the fovea ethmoidalis particularly on the right for which CSF leak should be considered.    An acute fracture is noted involving the left sphenoid bone, particularly involving the carotid canal on the left. There is a small amount of pneumocephalus in the prepontine cistern.    The visualized globes are symmetric bilaterally and the lenses are normally situated. There is no preseptal edema or retrobulbar hematoma. The intraconal/extraconal fat, extraocular muscles and optic nerve sheaths are intact.      CT cervical spine:    There is maintenance of the usual cervical lordosis without fracture or malalignment. The pre and paravertebral soft tissues are within the limits of normal.    The spinal canal is adequately patent and there is no significant neural foraminal narrowing.        IMPRESSION:    There is an acute fracture involving the frontal bone which traverse the frontal sinus as detailed with subarachnoid hemorrhage in the basal cisterns and acute subdural hemorrhages as described.    There is no midline shift or herniation.    There is an acute right LeFort fracture II. Complex nasoethmoidal orbital fractures as described in details above.    The skull base fracture involving the left sphenoid bone traverses through the anterior wall of the carotid canal and left clinoid processes, therefore CT angiography of the brain is recommended to exclude vascular dissection.    Acute fracture of the left fovea ethmoidalis ethmoidalis for which CSF leak cannot be entirely excluded.    No cervical spine fracture.      There is no stenosis involving major neck arteries.    NASCET CAROTID STENOSIS CRITERIA (distal normal appearing ICA as denominator for measurement): 0%-none, 1-49%-mild, 50-70%-moderate, 70-89%-severe, 90-99%-critical.

## 2021-10-17 NOTE — CONSULT NOTE ADULT - ASSESSMENT
Patient is a 61 year old right hand dominant previously healthy male BIBA as trauma B status post fall from ladder in a tree,     1. Neuro checks every one hour   2. CT scan of the brain 4 hours, from prior  3. MRI of the brain  T2 coronal reconstruction thin cuts less than 2mm  4. Decadron 4mg every 8 hours   5. Plan was discussed with Dr Cuba  Patient is a 61 year old right hand dominant previously healthy male BIBA as trauma B status post fall from ladder in a tree, 20 feet, with an acute skull fracture, SAH, left parietotemporal subdural hematoma, acute subdural hemorrhage layering along the right falx cerebelli, lefort 2 fracture.      1. Neuro checks every one hour   2. CT scan of the brain 4 hours, from prior CT scan   3. MRI of the brain  T2 coronal reconstruction thin cuts less than 2mm  4. Decadron 4mg every 8 hours   5. Plan was discussed with Dr Cuba

## 2021-10-17 NOTE — ED PROVIDER NOTE - ATTENDING CONTRIBUTION TO CARE
I performed a face to face history and physical exam of the patient and discussed their management with the student/resident/ACP. I reviewed the student/resident/ACP's note and agree with the documented findings and plan of care.    Pt unable to give hx.  EMS reports patient fell out of a tree approx. 20-25 ft.  Pt co arm and head pain.    physical - + facial trauma. +B/L wrist deformities.  rrr. ctab. abd - soft, nt. no edema. no rash. neuro - DOMINGUEZ. alert. answering questions.    plan - code trauma B on arrival.  Pt with multiple injuries including B/L wrist fractures, R acetabullar fx, SAH, skull fx, multiple facial fractures, posterior mediastinal hematoma. ortho and nsg consulted. will admit to trauma to SICU.

## 2021-10-17 NOTE — CONSULT NOTE ADULT - SUBJECTIVE AND OBJECTIVE BOX
HPI: TRAUMA SURGERY H&P    Admitting surgical team: Trauma Surgery  Admitting attending: Dr. Alvarez  Patient seen and examined: 10/172/2021 1130    Patient is a 61yM, unknown pmhx, BIBA as trauma B s/p fall from ladder in a tree approx 25 feet , hemodynamically stable en route, saturating 93% therefor placed on 2 L NC. Upon arrival to trauma bay, patient GCS 14, HD stable, c/o head and b/l arm pain. Primary survey intact, secondary survey pertinent findings include laceration to left forehead, b/l wrist deformities, b/l knee pain. Patient required mouth suctioning for blood causing him to cough/spit up.   Denies cp, sob, n/v/d, abd pain.     PRIMARY SURVEY:  A: Airway intact   B:  bilateral air entry, CTAB, trachea in midline  C: central and peripheral pulses intact bilaterally   D: GCS 14    E: exposed in a private room in the ED in order to fully examine any injuries     (17 Oct 2021 11:41)      INTERVAL HPI/OVERNIGHT EVENTS:  Patient seen at bedside, lying uncomfortably in bed. Exam limited d/t presence of polytrauma. Consulted to r/o L ICA dissection     Vital Signs Last 24 Hrs  T(C): 36.6 (17 Oct 2021 13:30), Max: 36.6 (17 Oct 2021 13:30)  T(F): 97.8 (17 Oct 2021 13:30), Max: 97.8 (17 Oct 2021 13:30)  HR: 87 (17 Oct 2021 17:00) (72 - 89)  BP: 139/82 (17 Oct 2021 17:00) (115/72 - 139/82)  BP(mean): 97 (17 Oct 2021 17:00) (84 - 97)  RR: 21 (17 Oct 2021 17:00) (17 - 22)  SpO2: 100% (17 Oct 2021 17:00) (97% - 100%)    PHYSICAL EXAM:  GENERAL: lying in bed uncomfortable  HEAD:  dressing C/D/I, R eye swollen  ROMY COMA SCORE: E4 V5 M6 = 15  MENTAL STATUS: AAO x3, Awake, Opens eyes spontaneously. Appropriately conversant without aphasia, following commands  CRANIAL NERVES: Exam limited d/t facial trauma. L pupil 3 mm brisk  MOTOR: moves all extremities, b/l UE splints  SENSATION: grossly intact to light touch all extremities    LABS:                        13.6   7.19  )-----------( 259      ( 17 Oct 2021 11:50 )             40.3     10    139  |  103  |  15.4  ----------------------------<  172<H>  3.5   |  20.0<L>  |  1.09    Ca    8.9      17 Oct 2021 11:50    TPro  7.1  /  Alb  4.2  /  TBili  0.3<L>  /  DBili  x   /  AST  36  /  ALT  39  /  AlkPhos  83  10    PT/INR - ( 17 Oct 2021 11:50 )   PT: 11.8 sec;   INR: 1.02 ratio         PTT - ( 17 Oct 2021 11:50 )  PTT:26.0 sec  Urinalysis Basic - ( 17 Oct 2021 14:45 )    Color: Pale Yellow / Appearance: Slightly Turbid / S.010 / pH: x  Gluc: x / Ketone: Negative  / Bili: Negative / Urobili: Negative mg/dL   Blood: x / Protein: 30 mg/dL / Nitrite: Negative   Leuk Esterase: Negative / RBC: >50 /HPF / WBC 0-2   Sq Epi: x / Non Sq Epi: Occasional / Bacteria: Negative        RADIOLOGY & ADDITIONAL TESTS:  < from: CT Head No Cont (10.17.21 @ 11:51) >  IMPRESSION:    There is an acute fracture involving the frontal bone which traverse the frontal sinus as detailed with subarachnoid hemorrhage in the basal cisterns and acute subdural hemorrhages as described.    There is no midline shift or herniation.    There is an acute right LeFort fracture II. Complex nasoethmoidal orbital fractures as described in details above.    The skull base fracture involving the left sphenoid bone traverses through the anterior wall of the carotid canal and left clinoid processes, therefore CT angiography of the brain is recommended to exclude vascular dissection.    Acute fracture of the left fovea ethmoidalis ethmoidalis for which CSF leak cannot be entirely excluded.    No cervical spine fracture.    < end of copied text >    < from: CT Angio Head w/ IV Cont (10.17.21 @ 13:11) >  IMPRESSION:    1. CTA brain: There is no large vessel occlusion or aneurysm involving major proximal intracranial arteries.    2. CTA NECK: Hypodensities in the left internal carotid artery in the petrous portion of the left temporal bone, for which multifocal dissection is difficult to exclude secondary to adjacent streak artifact. Further correlation with MRI of neck is suggested including T1 fat saturated images.    There is no stenosis involving major neck arteries.    NASCET CAROTID STENOSIS CRITERIA (distal normal appearing ICA as denominator for measurement): 0%-none, 1-49%-mild, 50-70%-moderate, 70-89%-severe, 90-99%-critical.    < end of copied text >          CAPRINI SCORE [CLOT]:  Patient has an estimated Caprini score of greater than 5.  However, the patient's unique clinical situation will be addressed in an individual manner to determine appropriate anticoagulation treatment, if any. HPI: TRAUMA SURGERY H&P    Admitting surgical team: Trauma Surgery  Admitting attending: Dr. Alvarez  Patient seen and examined: 10/172/2021 1130    Patient is a 61yM, unknown pmhx, BIBA as trauma B s/p fall from ladder in a tree approx 25 feet , hemodynamically stable en route, saturating 93% therefor placed on 2 L NC. Upon arrival to trauma bay, patient GCS 14, HD stable, c/o head and b/l arm pain. Primary survey intact, secondary survey pertinent findings include laceration to left forehead, b/l wrist deformities, b/l knee pain. Patient required mouth suctioning for blood causing him to cough/spit up.   Denies cp, sob, n/v/d, abd pain.     PRIMARY SURVEY:  A: Airway intact   B:  bilateral air entry, CTAB, trachea in midline  C: central and peripheral pulses intact bilaterally   D: GCS 14    E: exposed in a private room in the ED in order to fully examine any injuries     (17 Oct 2021 11:41)      INTERVAL HPI/OVERNIGHT EVENTS:  Patient seen at bedside, lying uncomfortably in bed. Exam limited d/t presence of polytrauma. Consulted to r/o L ICA dissection     Vital Signs Last 24 Hrs  T(C): 36.6 (17 Oct 2021 13:30), Max: 36.6 (17 Oct 2021 13:30)  T(F): 97.8 (17 Oct 2021 13:30), Max: 97.8 (17 Oct 2021 13:30)  HR: 87 (17 Oct 2021 17:00) (72 - 89)  BP: 139/82 (17 Oct 2021 17:00) (115/72 - 139/82)  BP(mean): 97 (17 Oct 2021 17:00) (84 - 97)  RR: 21 (17 Oct 2021 17:00) (17 - 22)  SpO2: 100% (17 Oct 2021 17:00) (97% - 100%)    PHYSICAL EXAM:  GENERAL: lying in bed uncomfortable  HEAD:  dressing C/D/I, R eye swollen  ROMY COMA SCORE: E4 V5 M6 = 15  MENTAL STATUS: AAO x3, Awake, Opens eyes spontaneously. Appropriately conversant without aphasia, following commands  CRANIAL NERVES: Exam limited d/t facial trauma. L pupil 3 mm brisk  MOTOR: moves all extremities, b/l UE splints  SENSATION: grossly intact to light touch all extremities    LABS:                        13.6   7.19  )-----------( 259      ( 17 Oct 2021 11:50 )             40.3     10    139  |  103  |  15.4  ----------------------------<  172<H>  3.5   |  20.0<L>  |  1.09    Ca    8.9      17 Oct 2021 11:50    TPro  7.1  /  Alb  4.2  /  TBili  0.3<L>  /  DBili  x   /  AST  36  /  ALT  39  /  AlkPhos  83  10    PT/INR - ( 17 Oct 2021 11:50 )   PT: 11.8 sec;   INR: 1.02 ratio         PTT - ( 17 Oct 2021 11:50 )  PTT:26.0 sec  Urinalysis Basic - ( 17 Oct 2021 14:45 )    Color: Pale Yellow / Appearance: Slightly Turbid / S.010 / pH: x  Gluc: x / Ketone: Negative  / Bili: Negative / Urobili: Negative mg/dL   Blood: x / Protein: 30 mg/dL / Nitrite: Negative   Leuk Esterase: Negative / RBC: >50 /HPF / WBC 0-2   Sq Epi: x / Non Sq Epi: Occasional / Bacteria: Negative        RADIOLOGY & ADDITIONAL TESTS:  < from: CT Head No Cont (10.17.21 @ 11:51) >  IMPRESSION:    There is an acute fracture involving the frontal bone which traverse the frontal sinus as detailed with subarachnoid hemorrhage in the basal cisterns and acute subdural hemorrhages as described.    There is no midline shift or herniation.    There is an acute right LeFort fracture II. Complex nasoethmoidal orbital fractures as described in details above.    The skull base fracture involving the left sphenoid bone traverses through the anterior wall of the carotid canal and left clinoid processes, therefore CT angiography of the brain is recommended to exclude vascular dissection.    Acute fracture of the left fovea ethmoidalis ethmoidalis for which CSF leak cannot be entirely excluded.    No cervical spine fracture.    < end of copied text >    < from: CT Angio Head w/ IV Cont (10.17.21 @ 13:11) >  IMPRESSION:    1. CTA brain: There is no large vessel occlusion or aneurysm involving major proximal intracranial arteries.    2. CTA NECK: Hypodensities in the left internal carotid artery in the petrous portion of the left temporal bone, for which multifocal dissection is difficult to exclude secondary to adjacent streak artifact. Further correlation with MRI of neck is suggested including T1 fat saturated images.    There is no stenosis involving major neck arteries.    NASCET CAROTID STENOSIS CRITERIA (distal normal appearing ICA as denominator for measurement): 0%-none, 1-49%-mild, 50-70%-moderate, 70-89%-severe, 90-99%-critical.    < end of copied text >

## 2021-10-17 NOTE — ED ADULT TRIAGE NOTE - CHIEF COMPLAINT QUOTE
EMS notification for Trauma activation. fall from appx 20 feet with gross deformity to upper extremity. GCS 15

## 2021-10-17 NOTE — H&P ADULT - NSHPPHYSICALEXAM_GEN_ALL_CORE
Constitutional: Mal, moderate distress  HEENT: Head with laceration left forehead 6.5 cm, maxillofacial structures stable, + blood in nares and oral cavity, left upper lip laceration 2 cm, no mitchell sign / racoon eyes, EOMI b/l, pupils [ 4]mm round and reactive to light b/l, no active drainage or redness  Neck: cervical collar in place, trachea midline  Respiratory: Breath sounds CTA b/l respirations are unlabored, no accessory muscle use, no conversational dyspnea  Cardiovascular: Regular rate & rhythm, +S1, S2  Chest: Chest wall is non-tender to palpation, no subQ emphysema or crepitus palpated  Gastrointestinal: LUQ ABRASION, RUQ kocher incision scar, Abdomen soft, non-tender, non-distended, no rebound tenderness / guarding, no ecchymosis or external signs of abdominal trauma  Extremities: b/l wrist deformities, b/l knee abrasions, moving all extremities spontaneously,   Pelvis: stable  Vascular: 2+ radial, femoral, and DP pulses b/l  Neurological: GCS: 14 (4/5/6). A&O x 3; no gross sensory / motor / coordination deficits  Musculoskeletal: 5/5 strength of upper and lower extremities b/l  Back: no C/T/LS spine tenderness to palpation, no step-offs or signs of external trauma to the back

## 2021-10-17 NOTE — CONSULT NOTE ADULT - SUBJECTIVE AND OBJECTIVE BOX
Pt Name: JOAN ESTRELLA    MRN: [id number]      Patient is a 61y old  Male who presents with a chief complaint of status post fall from tree (17 Oct 2021 11:53). patient seen at the bedside within 15 minutes of called consult. Patient seen with  Kayley. patient reports fall from ladder approximately 20 feet high while trimming hedges. Patient admits to pain to right hip and b/l wrists. Patient denies numbness or tingling.    No other orthopedic complaints.       REVIEW OF SYSTEMS    General: Alert, responsive. In c-collar    Skin: No rashes, no pruritis     Musculoskeletal: SEE HPI.    Neurological: No sensory or motor changes.         PAST MEDICAL & SURGICAL HISTORY:  PAST MEDICAL & SURGICAL HISTORY:  No pertinent past medical history        Allergies: Allergy Status Unknown      Medications: chlorhexidine 4% Liquid 1 Application(s) Topical <User Schedule>      FAMILY HISTORY:  : non-contributory    Social History:     Ambulation: Walking independently [x] With Cane [ ] With Walker [ ]  Bedbound [ ]                           13.6   7.19  )-----------( 259      ( 17 Oct 2021 11:50 )             40.3       10-17    139  |  103  |  15.4  ----------------------------<  172<H>  3.5   |  20.0<L>  |  1.09    Ca    8.9      17 Oct 2021 11:50    TPro  7.1  /  Alb  4.2  /  TBili  0.3<L>  /  DBili  x   /  AST  36  /  ALT  39  /  AlkPhos  83  10-17      Vital Signs Last 24 Hrs  T(C): 36.6 (17 Oct 2021 13:30), Max: 36.6 (17 Oct 2021 13:30)  T(F): 97.8 (17 Oct 2021 13:30), Max: 97.8 (17 Oct 2021 13:30)  HR: 72 (17 Oct 2021 15:00) (72 - 81)  BP: 122/70 (17 Oct 2021 15:00) (122/70 - 137/70)  BP(mean): --  RR: 17 (17 Oct 2021 15:00) (17 - 19)  SpO2: 97% (17 Oct 2021 15:00) (97% - 98%)    Daily     Daily       PHYSICAL EXAM:      Appearance: Alert, responsive in c collar.   Musculoskeletal:         Left Upper Extremity: Shoulder: +TTP, FROM. Abduction/adduction/flexion/extension intact. Elbow: NTTP, FROM. EE/EF intact. Wrist: +TTP, + swelling, minimal ROM secondary to pain. Hand: +TTP predominantly to 3rd/4th digits. +deformity to 3rd DIP and 4th PIP. decreased ROM of digits secondary to pain. Compartments soft compressible. Sensation intact to light touch. Rad pulse 2+. BCR       Right Upper Extremity: Shoulder: NTTP, FROM. Abduction/adduction/flexion/extension intact. Elbow: NTTP, FROM. EE/EF intact. Wrist:  Wrist: +TTP, + swelling, minimal ROM secondary to pain.  Hand: NTTP throughout, FROM. Abduction/adduction/flexion/extension of digits 1-5 intact. Compartments soft compressible. Sensation intact to light touch.        Left Lower Extremity: Hip: NTTP, FROM. Knee: NTTP, FROM. KE/KF intact. Ankle: NTTP, FROM. DF/PF/EHL/FHL intact. Compartments soft compressible. Sensation intact to light touch. DP pulse 2+       Right Lower Extremity: Hip: +TTP decreased ROM secondary to right hip pain. Knee: +TTP, +effusion. Ankle: NTTP, FROM. DF/PF/EHL/FHL intact. Compartments soft compressible. Sensation intact to light touch. DP pulse 2+, BCR     Imaging Studies:  < from: CT Abdomen and Pelvis w/ IV Cont (10.17.21 @ 12:09) >     EXAM:  CT ABDOMEN AND PELVIS IC                         EXAM:  CT CHEST IC                          PROCEDURE DATE:  10/17/2021          INTERPRETATION:  CLINICAL INFORMATION: Trauma. Status post fall from tree.    COMPARISON: CT scan 6/26/2004    CONTRAST/COMPLICATIONS:  IV Contrast: Omnipaque 300  95 cc administered   5 cc discarded  Oral Contrast: NONE  Complications: None reported at time of study completion    PROCEDURE:  CT of the Chest, Abdomen and Pelvis was performed.  Imaging was performed through the chest in the arterial phase followed by imaging of the abdomen and pelvis in the portal venous phase.  Sagittal and coronal reformats were performed.    FINDINGS:  CHEST:  LUNGS AND LARGE AIRWAYS: Patent central airways. Small patchy airspace opacity at the left lung base small patchy airspace opacity at the left lung base may reflect contusion.  PLEURA: No pleural effusion. No pneumothorax.  VESSELS: Mild stranding of the periaortic,  periesophageal and periazygos fat, without evidence of active contrast extravasation.. No pneumomediastinum.  HEART: Heart size is normal. No pericardial effusion.  MEDIASTINUM AND YENY: No lymphadenopathy.  CHEST WALL AND LOWER NECK: Within normal limits.    ABDOMEN AND PELVIS:  LIVER: Withinnormal limits.  BILE DUCTS: Normal caliber.  GALLBLADDER: Cholecystectomy.  SPLEEN: Within normal limits.  PANCREAS: Within normal limits.  ADRENALS: Within normal limits.  KIDNEYS/URETERS: Within normal limits.    BLADDER: Diffuse thickening of the bladder wall.  REPRODUCTIVE ORGANS: Prostate within normal limits.    BOWEL: No bowel obstruction. Appendix is normal.  PERITONEUM: No ascites.  VESSELS: Within normal limits.  RETROPERITONEUM/LYMPH NODES: No lymphadenopathy.  ABDOMINAL WALL: Within normal limits.  BONES: Comminuted fracture of the right acetabulum with intramuscular hematoma in the right pelvic sidewall musculature..    IMPRESSION:  Stranding of the mediastinal fat adjacent to the aorta, esophagus, and azygos vein concerning for mediastinal hematoma. No evidence of active extravasation or acute aortic injury. Findings may reflect venous injury.    Small patchy airspace opacity at the left lung base may reflect small contusion.    Comminuted fracture of the right acetabulum.    These findings were discussed with Dr. Alvarez at 10/17/2021 1:12 PM  who communicates understanding.    --- End of Report ---            JO ANN BLACKWELL MD; Attending Radiologist  This document has been electronically signed. Oct 17 2021  1:17PM    < end of copied text >      A/P:  Pt is a  61y Male s/p fall found to have R acetabular fracture being managed by Dr. Lozoya and B/L distal radius fractures being managed by Dr. Webb    PLAN:   - pain control   - NWB RLE, LUE, RUE  - bedrest  - CT b/l wrist/hands ordered, CT right knee ordered  - rest of plan pending images results  - plastics/neuro team following  - case/images/plan d/w Dr. Lozoya

## 2021-10-17 NOTE — H&P ADULT - ASSESSMENT
61 y M, fall 25 ft from ladder, trauma B, GCS 14,   -CT scan head, max face, c spine, c/a/p  -xray b/l wrist, knees, forearms  -npo  -pain control prn  -tertiary survey  -f/u labs

## 2021-10-17 NOTE — CONSULT NOTE ADULT - SUBJECTIVE AND OBJECTIVE BOX
HPI:    Patient is a 61yM, unknown pmhx, BIBA as trauma B s/p fall from ladder in a tree approx 25 feet , hemodynamically stable en route, saturating 93% therefor placed on 2 L NC. Upon arrival to trauma bay, patient GCS 14, HD stable, c/o head and b/l arm pain. Primary survey intact, secondary survery pertinent findings include laceration to left forehead, b/l wrist deformities, b/l knee pain. Patient required mouth suctioning for blood causing him to cough/spit up.   Denies cp, sob, n/v/d, abd pain.     PRIMARY SURVEY:  A: Airway intact   B:  bilateral air entry,  CTAB, trachea in midline  C: central and peripheral pulses intact bilaterally   D: GCS 14    E: exposed in a private room in the ED in order to fully examine any injuries     (17 Oct 2021 11:41)    PAST MEDICAL & SURGICAL HISTORY:  No pertinent past medical history    Home Medications:  Dilantin: 200 milligram(s) orally once a day (17 Oct 2021 17:34)    Review of Systems: All negative except where noted in HPI    MEDICATIONS  (STANDING):  acetaminophen  IVPB .. 1000 milliGRAM(s) IV Intermittent once  chlorhexidine 4% Liquid 1 Application(s) Topical <User Schedule>  dexAMETHasone     Tablet 4 milliGRAM(s) Oral every 8 hours  levETIRAcetam  IVPB 1000 milliGRAM(s) IV Intermittent every 12 hours  sodium chloride 0.9%. 1000 milliLiter(s) (100 mL/Hr) IV Continuous <Continuous>    MEDICATIONS  (PRN):  HYDROmorphone  Injectable 0.5 milliGRAM(s) IV Push every 3 hours PRN moderate to severe pain    SOCIAL HISTORY:    Vital Signs Last 24 Hrs  T(C): 37.3 (17 Oct 2021 19:00), Max: 37.3 (17 Oct 2021 19:00)  T(F): 99.2 (17 Oct 2021 19:00), Max: 99.2 (17 Oct 2021 19:00)  HR: 95 (17 Oct 2021 19:00) (72 - 96)  BP: 142/75 (17 Oct 2021 19:00) (115/72 - 153/88)  BP(mean): 88 (17 Oct 2021 19:00) (84 - 105)  RR: 16 (17 Oct 2021 19:00) (16 - 22)  SpO2: 96% (17 Oct 2021 19:00) (96% - 100%)    Physical Exam:    General: NAD  HEENT: PERRL, EOMI  Neck: No JVD, FROM without pain  Pulm: Respirations non labored, no accessory muscle use  CVS: S1S2  Abdomen: Soft, NT/ND, without rebound or guarding  Neuro: Alert and oriented x3, no focal deficits    LABS:                        13.6   7.19  )-----------( 259      ( 17 Oct 2021 11:50 )             40.3     10-    139  |  103  |  15.4  ----------------------------<  172<H>  3.5   |  20.0<L>  |  1.09    Ca    8.9      17 Oct 2021 11:50    TPro  7.1  /  Alb  4.2  /  TBili  0.3<L>  /  DBili  x   /  AST  36  /  ALT  39  /  AlkPhos  83  1017    PT/INR - ( 17 Oct 2021 11:50 )   PT: 11.8 sec;   INR: 1.02 ratio      PTT - ( 17 Oct 2021 11:50 )  PTT:26.0 sec  Urinalysis Basic - ( 17 Oct 2021 14:45 )    Color: Pale Yellow / Appearance: Slightly Turbid / S.010 / pH: x  Gluc: x / Ketone: Negative  / Bili: Negative / Urobili: Negative mg/dL   Blood: x / Protein: 30 mg/dL / Nitrite: Negative   Leuk Esterase: Negative / RBC: >50 /HPF / WBC 0-2   Sq Epi: x / Non Sq Epi: Occasional / Bacteria: Negative    RADIOLOGY & ADDITIONAL STUDIES    Assessment / Plan:   Patient is a 61yM, unknown pmhx, BIBA as trauma B s/p fall from ladder in a tree approx 25 feet, traumatic injuries iclude scattered SAH - stable on repeat CT scan, maxillary sinus and nasal bone fxs, Lefort II fracture, multiple additional facial fractures, b/l distal radius fractures, R posterior acetabular fracture, a possible mediastinal hematoma, and a possible L ICA dissection.     Plan:  - q1 hour neurochecks  - log roll only  - MRI in AM if HD stable  - follow up with neuro IR regarding recommendations for possible L ICA dissection  - follow up with ortho, possible OR 10/19   - follow up with plastics, will likely need plating and wiring for [place] fracture  - NPO with medications for now  - Currently HD stable, will continue to monitor  - trend h/h, replete electrolytes as needed.

## 2021-10-17 NOTE — ED PROVIDER NOTE - PHYSICAL EXAMINATION
Constitutional: Well-developed well nourished male    Neurological: GCS: 15 (4/4/6). A&O x 3; no gross sensory / motor / coordination deficits    HEENT: Head is normocephalic and laceration to forehead, mandibular alignment intact, midface stable, +blood oral cavity, no mitchell sign / racoon eyes, EOMI b/l, pupils 3 mm round and reactive to light b/l, no active drainage or redness    Neck: cervical collar in place, trachea midline    Respiratory: Breath sounds CTA b/l respirations are unlabored, no accessory muscle use, no conversational dyspnea    Cardiovascular: Regular rate & rhythm, +S1, S1, Chest wall is non-tender to palpation, no subQ emphysema or crepitus palpated    Gastrointestinal: Abdomen soft, non-tender, non-distended, no rebound tenderness / guarding, no abrasion across abdomen    Pelvis: stable    Neurospinal: C/T/L/S spines have no tenderness to palpation, no step-offs or signs of external trauma.    Musculoskeletal: moving all extremities spontaneously. No point tenderness, instability, or gross msk deformity noted to upper or lower extremities bilaterally.  5/5 strength of upper and lower extremities bilaterally.     Vascular: 2+ radial, femoral, and DP pulses b/l    Skin: abrasion over b/l knee

## 2021-10-17 NOTE — PROCEDURE NOTE - NSLAC2LOCATION_SKIN_A_CORE
From: Joyce Smallwood  To: Gian Ngo  Sent: 1/18/2021 11:48 AM CST  Subject: Other    Dr Ngo   The hernia was in my stomach and know it's in chest. 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

## 2021-10-17 NOTE — CONSULT NOTE ADULT - ASSESSMENT
Assessment  67 y/o M s/p 25 ft. fall from ladder, BIBEMS as a Code Trauma B, polytrauma. Consulted to r/o L ICA dissection as CTA was not definitive.       Plan  - case d/w attending  - [facility] MRI / MRA Head & Neck with T1 Fat sat to r/o stroke & dissection  - will continue to follow

## 2021-10-17 NOTE — CONSULT NOTE ADULT - ATTENDING COMMENTS
Rule out dissection. I have reviewed the CTA head and neck and I have low suspicion for dissection but to completely exclude one needs further studies as stated above.

## 2021-10-18 LAB
ABO RH CONFIRMATION: SIGNIFICANT CHANGE UP
ALBUMIN SERPL ELPH-MCNC: 3.5 G/DL — SIGNIFICANT CHANGE UP (ref 3.3–5.2)
ANION GAP SERPL CALC-SCNC: 9 MMOL/L — SIGNIFICANT CHANGE UP (ref 5–17)
BASOPHILS # BLD AUTO: 0 K/UL — SIGNIFICANT CHANGE UP (ref 0–0.2)
BASOPHILS NFR BLD AUTO: 0 % — SIGNIFICANT CHANGE UP (ref 0–2)
BUN SERPL-MCNC: 16.6 MG/DL — SIGNIFICANT CHANGE UP (ref 8–20)
CALCIUM SERPL-MCNC: 7.9 MG/DL — LOW (ref 8.6–10.2)
CHLORIDE SERPL-SCNC: 106 MMOL/L — SIGNIFICANT CHANGE UP (ref 98–107)
CK MB CFR SERPL CALC: 5 NG/ML — SIGNIFICANT CHANGE UP (ref 0–6.7)
CK SERPL-CCNC: 300 U/L — HIGH (ref 30–200)
CO2 SERPL-SCNC: 25 MMOL/L — SIGNIFICANT CHANGE UP (ref 22–29)
COVID-19 SPIKE DOMAIN AB INTERP: POSITIVE
COVID-19 SPIKE DOMAIN ANTIBODY RESULT: >250 U/ML — HIGH
CREAT SERPL-MCNC: 0.72 MG/DL — SIGNIFICANT CHANGE UP (ref 0.5–1.3)
EOSINOPHIL # BLD AUTO: 0 K/UL — SIGNIFICANT CHANGE UP (ref 0–0.5)
EOSINOPHIL NFR BLD AUTO: 0 % — SIGNIFICANT CHANGE UP (ref 0–6)
GIANT PLATELETS BLD QL SMEAR: PRESENT — SIGNIFICANT CHANGE UP
GLUCOSE BLDC GLUCOMTR-MCNC: 133 MG/DL — HIGH (ref 70–99)
GLUCOSE BLDC GLUCOMTR-MCNC: 134 MG/DL — HIGH (ref 70–99)
GLUCOSE SERPL-MCNC: 161 MG/DL — HIGH (ref 70–99)
HCT VFR BLD CALC: 36 % — LOW (ref 39–50)
HGB BLD-MCNC: 12 G/DL — LOW (ref 13–17)
LYMPHOCYTES # BLD AUTO: 0 % — LOW (ref 13–44)
LYMPHOCYTES # BLD AUTO: 0 K/UL — LOW (ref 1–3.3)
MAGNESIUM SERPL-MCNC: 1.8 MG/DL — SIGNIFICANT CHANGE UP (ref 1.6–2.6)
MANUAL SMEAR VERIFICATION: SIGNIFICANT CHANGE UP
MCHC RBC-ENTMCNC: 29.8 PG — SIGNIFICANT CHANGE UP (ref 27–34)
MCHC RBC-ENTMCNC: 33.3 GM/DL — SIGNIFICANT CHANGE UP (ref 32–36)
MCV RBC AUTO: 89.3 FL — SIGNIFICANT CHANGE UP (ref 80–100)
MONOCYTES # BLD AUTO: 0.47 K/UL — SIGNIFICANT CHANGE UP (ref 0–0.9)
MONOCYTES NFR BLD AUTO: 3.5 % — SIGNIFICANT CHANGE UP (ref 2–14)
NEUTROPHILS # BLD AUTO: 12.9 K/UL — HIGH (ref 1.8–7.4)
NEUTROPHILS NFR BLD AUTO: 94.8 % — HIGH (ref 43–77)
NEUTS BAND # BLD: 1.7 % — SIGNIFICANT CHANGE UP (ref 0–8)
PHENYTOIN FREE SERPL-MCNC: <2.9 UG/ML — LOW (ref 10–20)
PHOSPHATE SERPL-MCNC: 2.9 MG/DL — SIGNIFICANT CHANGE UP (ref 2.4–4.7)
PLAT MORPH BLD: NORMAL — SIGNIFICANT CHANGE UP
PLATELET # BLD AUTO: 205 K/UL — SIGNIFICANT CHANGE UP (ref 150–400)
POTASSIUM SERPL-MCNC: 4 MMOL/L — SIGNIFICANT CHANGE UP (ref 3.5–5.3)
POTASSIUM SERPL-SCNC: 4 MMOL/L — SIGNIFICANT CHANGE UP (ref 3.5–5.3)
RBC # BLD: 4.03 M/UL — LOW (ref 4.2–5.8)
RBC # FLD: 12.2 % — SIGNIFICANT CHANGE UP (ref 10.3–14.5)
RBC BLD AUTO: NORMAL — SIGNIFICANT CHANGE UP
SARS-COV-2 IGG+IGM SERPL QL IA: >250 U/ML — HIGH
SARS-COV-2 IGG+IGM SERPL QL IA: POSITIVE
SODIUM SERPL-SCNC: 140 MMOL/L — SIGNIFICANT CHANGE UP (ref 135–145)
WBC # BLD: 13.37 K/UL — HIGH (ref 3.8–10.5)
WBC # FLD AUTO: 13.37 K/UL — HIGH (ref 3.8–10.5)

## 2021-10-18 PROCEDURE — 99291 CRITICAL CARE FIRST HOUR: CPT

## 2021-10-18 PROCEDURE — 99232 SBSQ HOSP IP/OBS MODERATE 35: CPT

## 2021-10-18 PROCEDURE — 73700 CT LOWER EXTREMITY W/O DYE: CPT | Mod: 26,RT

## 2021-10-18 PROCEDURE — 71045 X-RAY EXAM CHEST 1 VIEW: CPT | Mod: 26

## 2021-10-18 PROCEDURE — 73070 X-RAY EXAM OF ELBOW: CPT | Mod: 26,LT

## 2021-10-18 RX ORDER — HYDROMORPHONE HYDROCHLORIDE 2 MG/ML
0.5 INJECTION INTRAMUSCULAR; INTRAVENOUS; SUBCUTANEOUS ONCE
Refills: 0 | Status: DISCONTINUED | OUTPATIENT
Start: 2021-10-18 | End: 2021-10-18

## 2021-10-18 RX ORDER — PANTOPRAZOLE SODIUM 20 MG/1
40 TABLET, DELAYED RELEASE ORAL DAILY
Refills: 0 | Status: DISCONTINUED | OUTPATIENT
Start: 2021-10-18 | End: 2021-10-21

## 2021-10-18 RX ORDER — HYDRALAZINE HCL 50 MG
10 TABLET ORAL ONCE
Refills: 0 | Status: COMPLETED | OUTPATIENT
Start: 2021-10-18 | End: 2021-10-18

## 2021-10-18 RX ORDER — DEXTROSE 50 % IN WATER 50 %
25 SYRINGE (ML) INTRAVENOUS ONCE
Refills: 0 | Status: DISCONTINUED | OUTPATIENT
Start: 2021-10-18 | End: 2021-10-20

## 2021-10-18 RX ORDER — FOSPHENYTOIN 50 MG/ML
150 INJECTION INTRAMUSCULAR; INTRAVENOUS EVERY 12 HOURS
Refills: 0 | Status: DISCONTINUED | OUTPATIENT
Start: 2021-10-18 | End: 2021-10-18

## 2021-10-18 RX ORDER — DEXTROSE 50 % IN WATER 50 %
15 SYRINGE (ML) INTRAVENOUS ONCE
Refills: 0 | Status: DISCONTINUED | OUTPATIENT
Start: 2021-10-18 | End: 2021-10-20

## 2021-10-18 RX ORDER — SODIUM CHLORIDE 9 MG/ML
1000 INJECTION, SOLUTION INTRAVENOUS
Refills: 0 | Status: DISCONTINUED | OUTPATIENT
Start: 2021-10-18 | End: 2021-10-19

## 2021-10-18 RX ORDER — GLUCAGON INJECTION, SOLUTION 0.5 MG/.1ML
1 INJECTION, SOLUTION SUBCUTANEOUS ONCE
Refills: 0 | Status: DISCONTINUED | OUTPATIENT
Start: 2021-10-18 | End: 2021-10-20

## 2021-10-18 RX ORDER — HYDROMORPHONE HYDROCHLORIDE 2 MG/ML
1 INJECTION INTRAMUSCULAR; INTRAVENOUS; SUBCUTANEOUS
Refills: 0 | Status: DISCONTINUED | OUTPATIENT
Start: 2021-10-18 | End: 2021-10-21

## 2021-10-18 RX ORDER — FOSPHENYTOIN 50 MG/ML
150 INJECTION INTRAMUSCULAR; INTRAVENOUS EVERY 12 HOURS
Refills: 0 | Status: DISCONTINUED | OUTPATIENT
Start: 2021-10-18 | End: 2021-10-21

## 2021-10-18 RX ORDER — PANTOPRAZOLE SODIUM 20 MG/1
1 TABLET, DELAYED RELEASE ORAL
Qty: 0 | Refills: 0 | DISCHARGE

## 2021-10-18 RX ORDER — DEXTROSE 50 % IN WATER 50 %
12.5 SYRINGE (ML) INTRAVENOUS ONCE
Refills: 0 | Status: DISCONTINUED | OUTPATIENT
Start: 2021-10-18 | End: 2021-10-20

## 2021-10-18 RX ORDER — INSULIN LISPRO 100/ML
VIAL (ML) SUBCUTANEOUS
Refills: 0 | Status: DISCONTINUED | OUTPATIENT
Start: 2021-10-18 | End: 2021-10-21

## 2021-10-18 RX ORDER — ACETAMINOPHEN 500 MG
1000 TABLET ORAL ONCE
Refills: 0 | Status: DISCONTINUED | OUTPATIENT
Start: 2021-10-18 | End: 2021-10-18

## 2021-10-18 RX ORDER — CALCIUM GLUCONATE 100 MG/ML
2 VIAL (ML) INTRAVENOUS ONCE
Refills: 0 | Status: COMPLETED | OUTPATIENT
Start: 2021-10-18 | End: 2021-10-18

## 2021-10-18 RX ORDER — ACETAMINOPHEN 500 MG
1000 TABLET ORAL ONCE
Refills: 0 | Status: COMPLETED | OUTPATIENT
Start: 2021-10-18 | End: 2021-10-18

## 2021-10-18 RX ORDER — ERGOCALCIFEROL 1.25 MG/1
1 CAPSULE ORAL
Qty: 0 | Refills: 0 | DISCHARGE

## 2021-10-18 RX ORDER — ATORVASTATIN CALCIUM 80 MG/1
1 TABLET, FILM COATED ORAL
Qty: 0 | Refills: 0 | DISCHARGE

## 2021-10-18 RX ORDER — METOPROLOL TARTRATE 50 MG
5 TABLET ORAL ONCE
Refills: 0 | Status: COMPLETED | OUTPATIENT
Start: 2021-10-18 | End: 2021-10-18

## 2021-10-18 RX ADMIN — LEVETIRACETAM 400 MILLIGRAM(S): 250 TABLET, FILM COATED ORAL at 05:48

## 2021-10-18 RX ADMIN — Medication 5 MILLIGRAM(S): at 18:56

## 2021-10-18 RX ADMIN — HYDROMORPHONE HYDROCHLORIDE 1 MILLIGRAM(S): 2 INJECTION INTRAMUSCULAR; INTRAVENOUS; SUBCUTANEOUS at 15:48

## 2021-10-18 RX ADMIN — Medication 1.25 MILLIGRAM(S): at 16:16

## 2021-10-18 RX ADMIN — Medication 400 MILLIGRAM(S): at 18:56

## 2021-10-18 RX ADMIN — PANTOPRAZOLE SODIUM 40 MILLIGRAM(S): 20 TABLET, DELAYED RELEASE ORAL at 11:04

## 2021-10-18 RX ADMIN — Medication 1.25 MILLIGRAM(S): at 21:21

## 2021-10-18 RX ADMIN — HYDROMORPHONE HYDROCHLORIDE 0.5 MILLIGRAM(S): 2 INJECTION INTRAMUSCULAR; INTRAVENOUS; SUBCUTANEOUS at 07:30

## 2021-10-18 RX ADMIN — Medication 5 MILLIGRAM(S): at 21:21

## 2021-10-18 RX ADMIN — HYDROMORPHONE HYDROCHLORIDE 0.5 MILLIGRAM(S): 2 INJECTION INTRAMUSCULAR; INTRAVENOUS; SUBCUTANEOUS at 23:09

## 2021-10-18 RX ADMIN — Medication 5 MILLIGRAM(S): at 05:52

## 2021-10-18 RX ADMIN — HYDROMORPHONE HYDROCHLORIDE 0.5 MILLIGRAM(S): 2 INJECTION INTRAMUSCULAR; INTRAVENOUS; SUBCUTANEOUS at 19:14

## 2021-10-18 RX ADMIN — HYDROMORPHONE HYDROCHLORIDE 1 MILLIGRAM(S): 2 INJECTION INTRAMUSCULAR; INTRAVENOUS; SUBCUTANEOUS at 11:20

## 2021-10-18 RX ADMIN — HYDROMORPHONE HYDROCHLORIDE 0.5 MILLIGRAM(S): 2 INJECTION INTRAMUSCULAR; INTRAVENOUS; SUBCUTANEOUS at 07:17

## 2021-10-18 RX ADMIN — FOSPHENYTOIN 106 MILLIGRAM(S) PE: 50 INJECTION INTRAMUSCULAR; INTRAVENOUS at 12:37

## 2021-10-18 RX ADMIN — Medication 10 MILLIGRAM(S): at 17:29

## 2021-10-18 RX ADMIN — CHLORHEXIDINE GLUCONATE 1 APPLICATION(S): 213 SOLUTION TOPICAL at 05:53

## 2021-10-18 RX ADMIN — HYDROMORPHONE HYDROCHLORIDE 0.5 MILLIGRAM(S): 2 INJECTION INTRAMUSCULAR; INTRAVENOUS; SUBCUTANEOUS at 02:04

## 2021-10-18 RX ADMIN — HYDROMORPHONE HYDROCHLORIDE 0.5 MILLIGRAM(S): 2 INJECTION INTRAMUSCULAR; INTRAVENOUS; SUBCUTANEOUS at 01:37

## 2021-10-18 RX ADMIN — Medication 200 GRAM(S): at 06:29

## 2021-10-18 RX ADMIN — Medication 5 MILLIGRAM(S): at 13:06

## 2021-10-18 RX ADMIN — Medication 1000 MILLIGRAM(S): at 20:16

## 2021-10-18 RX ADMIN — SODIUM CHLORIDE 100 MILLILITER(S): 9 INJECTION INTRAMUSCULAR; INTRAVENOUS; SUBCUTANEOUS at 05:51

## 2021-10-18 RX ADMIN — HYDROMORPHONE HYDROCHLORIDE 1 MILLIGRAM(S): 2 INJECTION INTRAMUSCULAR; INTRAVENOUS; SUBCUTANEOUS at 11:04

## 2021-10-18 RX ADMIN — HYDROMORPHONE HYDROCHLORIDE 0.5 MILLIGRAM(S): 2 INJECTION INTRAMUSCULAR; INTRAVENOUS; SUBCUTANEOUS at 01:41

## 2021-10-18 RX ADMIN — HYDROMORPHONE HYDROCHLORIDE 0.5 MILLIGRAM(S): 2 INJECTION INTRAMUSCULAR; INTRAVENOUS; SUBCUTANEOUS at 18:34

## 2021-10-18 RX ADMIN — HYDROMORPHONE HYDROCHLORIDE 1 MILLIGRAM(S): 2 INJECTION INTRAMUSCULAR; INTRAVENOUS; SUBCUTANEOUS at 16:05

## 2021-10-18 RX ADMIN — HYDROMORPHONE HYDROCHLORIDE 0.5 MILLIGRAM(S): 2 INJECTION INTRAMUSCULAR; INTRAVENOUS; SUBCUTANEOUS at 01:16

## 2021-10-18 NOTE — PROGRESS NOTE ADULT - ASSESSMENT
Plan  - Q1 neuro checks  - Imaging stable   - COnt Dec 4q8  - MRI Brain w/ T2 coronal recon thin cuts <2mm   -  -  -  - D/w Dr. Cuba            Plan  - Q1 neuro checks  - Imaging stable   - Cont Dec 4q8  - MRI Brain w/ T2 coronal recon thin cuts <2mm   -  - CSF leak precautions monitor for any drainage, salty taste; avoid using straws/increasing intracranial pressure, avoid straining, no straw use.  - Medical management/supportive care per primary team  - D/w Dr. Cuba  61M, unknown PMHX, BIBA as trauma B s/p fall from ladder ~25ft, c/o head and b/l arm pain. Polytrauma found to have laceration to left forehead, b/l wrist deformities, b/l knee pain. CTH found SAH/SDH/multiple facial fx's          Plan  - Q1 neuro checks  - Imaging stable   - Cont Dec 4q8  - MRI Brain w/ T2 coronal recon thin cuts <2mm   - CSF leak precautions monitor for any drainage, salty taste; avoid using straws/increasing intracranial/abdominal pressure, avoid straining, no straw use.  - Patient may be intubated for Plastic surgical procedure; please avoid upward motion and would recommend not placing NG tube, only if absolutely necessary   - Medical management/supportive care per primary team  - D/w Dr. Cuba  61M, unknown PMHX, BIBA as trauma B s/p fall from ladder ~25ft, c/o head and b/l arm pain. Polytrauma found to have laceration to left forehead, b/l wrist deformities, b/l knee pain. CTH found SAH/SDH/multiple facial fx's          Plan  - Q1 neuro checks  - Imaging stable   - Cont Dec 4q8  - MRI Brain w/ T2 coronal recon thin cuts <2mm   - CSF leak precautions monitor for any drainage, salty taste; avoid using straws/increasing intracranial/abdominal pressure, avoid straining, no straw use.  - Patient requiring Nasopharyngeal intubation for plastic surgical procedure; must avoid penetrating sphenoid sinus d/t fx's. Any procedure that manipulates the nasal cavity are high risk given multiple fx, but if risks outweigh benefits & alternatives; discretion up to primary team if absolutely necessary.    - Medical management/supportive care per primary team  - D/w Dr. Cuba  61M, unknown PMHX, BIBA as trauma B s/p fall from ladder ~25ft, c/o head and b/l arm pain. Polytrauma found to have laceration to left forehead, b/l wrist deformities, b/l knee pain. CTH found SAH/SDH/multiple facial fx's          Plan  - Q1 neuro checks  - Imaging stable   - Cont Dec 4q8  - MRI Brain w/ T2 coronal recon thin cuts <2mm   - CSF leak precautions  - Monitor for any drainage, salty taste, otorrhea rhinorhea; avoid increasing intracranial&abdominal pressure, avoid straining, stool softeners PRN. No straw use/blowing nose.  - Patient requiring Nasopharyngeal intubation for plastic surgical procedure; must avoid penetrating sphenoid sinus d/t fx's. Any procedure that manipulates the nasal cavity is high risk given multiple fx, but if risks outweigh benefits & alternatives; discretion up to primary team if absolutely necessary.    - Medical management/supportive care per primary team  - D/w Dr. Cuba

## 2021-10-18 NOTE — CONSULT NOTE ADULT - ASSESSMENT
Patient is a 62 yo male s/p multiple facial fractures. He will require ORIF repair. Plan for OR Wednesday pending availability and NSx input.     Recommendations:  -Please obtain Neurosurgery input re: posterior table minimally displaced fracture  -Mechanical soft diet for comfort, then NPO at MN tomorrow night  -10mg IV decadron q12  -Peridex TID  -PRN Pain control     D/W ICU Team and Dr. Cisneros     Patient is a 60 yo male s/p multiple facial fractures. He will require ORIF repair. Plan for OR Wednesday pending availability and NSx input. Pt will be wired shut post operatively     Recommendations:  -Please obtain Neurosurgery input re: posterior table minimally displaced fracture  -Mechanical soft diet for comfort, then NPO at MN tomorrow night  -10mg IV decadron q12  -Peridex TID  -PRN Pain control     D/W ICU Team and Dr. Cisneros        Addendum: Per NSx, no contraindication for ORIF frontal sinus fracture. No surgical intervention on their behalf.

## 2021-10-18 NOTE — PROGRESS NOTE ADULT - ATTENDING COMMENTS
patient was seen and examined during rounds  I agree with note and exam  patient with polytrauma with  sah/sdh, facial fx., r/o ica dissection, mediastinal hematoma, upper and lower extremity fx   gcs 15  hd stable, slight hypertensive, pain meds adjusted  cardiopulmonary stable  abdomen soft, nt/nd, awaiting speech and swallow ? dysphasia related to mediastinal hematoma  urine output adequate  on chemical dvt prophylaxis only due to hematoma and ICH

## 2021-10-18 NOTE — PROGRESS NOTE ADULT - SUBJECTIVE AND OBJECTIVE BOX
24h Events:   Pt admitted to SICU for q1 hour neurochecks and close hemodynamic monitoring. CTA  with L possible ICA dissection. Neuro IR consulting, recommending MRI. No AC at this point as risks outweigh benefits given SDH and SAH. Repeat CT head with stable SAH and SDH. Pt remains lethargic, but arousable. Complaining of severe pain to b/l forearms. Pain regimen increased.     ICU Vital Signs Last 24 Hrs  T(C): 36.9 (18 Oct 2021 04:00), Max: 37.3 (17 Oct 2021 19:00)  T(F): 98.5 (18 Oct 2021 04:00), Max: 99.2 (17 Oct 2021 19:00)  HR: 90 (18 Oct 2021 04:00) (72 - 96)  BP: 154/88 (18 Oct 2021 04:00) (115/72 - 162/81)  BP(mean): 100 (18 Oct 2021 04:00) (84 - 105)  ABP: --  ABP(mean): --  RR: 16 (18 Oct 2021 04:00) (11 - 22)  SpO2: 96% (18 Oct 2021 04:00) (88% - 100%)    I&O's Detail    17 Oct 2021 07:01  -  18 Oct 2021 04:11  --------------------------------------------------------  IN:    IV PiggyBack: 100 mL    IV PiggyBack: 200 mL    sodium chloride 0.9%: 1080 mL  Total IN: 1380 mL    OUT:    Voided (mL): 400 mL  Total OUT: 400 mL    Total NET: 980 mL    MEDICATIONS  (STANDING):  chlorhexidine 4% Liquid 1 Application(s) Topical <User Schedule>  dexAMETHasone  Injectable 5 milliGRAM(s) IV Push every 8 hours  influenza   Vaccine 0.5 milliLiter(s) IntraMuscular once  levETIRAcetam  IVPB 1000 milliGRAM(s) IV Intermittent every 12 hours  sodium chloride 0.9%. 1000 milliLiter(s) (100 mL/Hr) IV Continuous <Continuous>    MEDICATIONS  (PRN):  HYDROmorphone  Injectable 0.5 milliGRAM(s) IV Push every 3 hours PRN moderate to severe pain    Physical Exam:    Gen: Resting in bed, lethargic but easy to arouse    HEENT: PERRL, EOMI    Neurological: Alert and oriented x2/3 without focal deficit, able to move all extremities    Neck: Trachea midline, no evidence of JVD, FROM without pain, neck symmetric    Pulmonary: CTAB with decreased breath sounds at the bases    Cardiovascular: S1S2, regular rate and rhythm    Gastrointestinal: Soft, non-tender, non-distended    : Norris in place draining yellow urine    Extremities: b/l short cast to arms    Skin: forehead laceration s/p repair in trauma bay    Musculoskeletal: severe pain to b/l wrists, unable to asses ROM due to casts, b/l LE with slightly limited ROM due to acetabular fracture    LABS:  CBC Full  -  ( 17 Oct 2021 11:50 )  WBC Count : 7.19 K/uL  RBC Count : 4.59 M/uL  Hemoglobin : 13.6 g/dL  Hematocrit : 40.3 %  Platelet Count - Automated : 259 K/uL  Mean Cell Volume : 87.8 fl  Mean Cell Hemoglobin : 29.6 pg  Mean Cell Hemoglobin Concentration : 33.7 gm/dL  Auto Neutrophil # : 4.10 K/uL  Auto Lymphocyte # : 2.35 K/uL  Auto Monocyte # : 0.48 K/uL  Auto Eosinophil # : 0.19 K/uL  Auto Basophil # : 0.03 K/uL  Auto Neutrophil % : 57.0 %  Auto Lymphocyte % : 32.7 %  Auto Monocyte % : 6.7 %  Auto Eosinophil % : 2.6 %  Auto Basophil % : 0.4 %    10-17    139  |  103  |  15.4  ----------------------------<  172<H>  3.5   |  20.0<L>  |  1.09    Ca    8.9      17 Oct 2021 11:50    TPro  7.1  /  Alb  4.2  /  TBili  0.3<L>  /  DBili  x   /  AST  36  /  ALT  39  /  AlkPhos  83  10-    PT/INR - ( 17 Oct 2021 11:50 )   PT: 11.8 sec;   INR: 1.02 ratio      PTT - ( 17 Oct 2021 11:50 )  PTT:26.0 sec  Urinalysis Basic - ( 17 Oct 2021 14:45 )    Color: Pale Yellow / Appearance: Slightly Turbid / S.010 / pH: x  Gluc: x / Ketone: Negative  / Bili: Negative / Urobili: Negative mg/dL   Blood: x / Protein: 30 mg/dL / Nitrite: Negative   Leuk Esterase: Negative / RBC: >50 /HPF / WBC 0-2   Sq Epi: x / Non Sq Epi: Occasional / Bacteria: Negative    RECENT CULTURES:    LIVER FUNCTIONS - ( 17 Oct 2021 11:50 )  Alb: 4.2 g/dL / Pro: 7.1 g/dL / ALK PHOS: 83 U/L / ALT: 39 U/L / AST: 36 U/L / GGT: x           ASSESSMENT/PLAN:  Patient is a 61yM, BIBA as trauma B s/p fall from ladder (approx 25 feet), found to have multiple traumatic injuries including scattered SAH, L SDH, maxillary sinus and nasal bone fractures, Lefort II fracture, nasoethmoid orbital fracture, left foveo ethmoidalis fracture, frontal bone fracture      Neuro: Pain control, delirium precautions, sleep hygiene     CV:     Pulm:     GI/Nutrition:     /Renal: Norris for strict I&O    ID:     Endo:     Skin: Repositioning for DTI prevention while in bed    Heme/DVT Prophylaxis: SCDs    Lines/Tubes:     Dispo:      24h Events:   Pt admitted to SICU for q1 hour neurochecks and close hemodynamic monitoring. CTA  with L possible ICA dissection. Neuro IR consulting, recommending MRI. No AC at this point as risks outweigh benefits given SDH and SAH. Repeat CT head with stable SAH and SDH. Pt remains lethargic, but arousable. Complaining of severe pain to b/l forearms. Pain regimen increased.     ICU Vital Signs Last 24 Hrs  T(C): 36.9 (18 Oct 2021 04:00), Max: 37.3 (17 Oct 2021 19:00)  T(F): 98.5 (18 Oct 2021 04:00), Max: 99.2 (17 Oct 2021 19:00)  HR: 90 (18 Oct 2021 04:00) (72 - 96)  BP: 154/88 (18 Oct 2021 04:00) (115/72 - 162/81)  BP(mean): 100 (18 Oct 2021 04:00) (84 - 105)  ABP: --  ABP(mean): --  RR: 16 (18 Oct 2021 04:00) (11 - 22)  SpO2: 96% (18 Oct 2021 04:00) (88% - 100%)    I&O's Detail    17 Oct 2021 07:01  -  18 Oct 2021 04:11  --------------------------------------------------------  IN:    IV PiggyBack: 100 mL    IV PiggyBack: 200 mL    sodium chloride 0.9%: 1080 mL  Total IN: 1380 mL    OUT:    Voided (mL): 400 mL  Total OUT: 400 mL    Total NET: 980 mL    MEDICATIONS  (STANDING):  chlorhexidine 4% Liquid 1 Application(s) Topical <User Schedule>  dexAMETHasone  Injectable 5 milliGRAM(s) IV Push every 8 hours  influenza   Vaccine 0.5 milliLiter(s) IntraMuscular once  levETIRAcetam  IVPB 1000 milliGRAM(s) IV Intermittent every 12 hours  sodium chloride 0.9%. 1000 milliLiter(s) (100 mL/Hr) IV Continuous <Continuous>    MEDICATIONS  (PRN):  HYDROmorphone  Injectable 0.5 milliGRAM(s) IV Push every 3 hours PRN moderate to severe pain    Physical Exam:    Gen: Resting in bed, lethargic but easy to arouse    HEENT: PERRL, EOMI    Neurological: Alert and oriented x2/3 without focal deficit, able to move all extremities    Neck: Trachea midline, no evidence of JVD, FROM without pain, neck symmetric    Pulmonary: CTAB with decreased breath sounds at the bases    Cardiovascular: S1S2, regular rate and rhythm    Gastrointestinal: Soft, non-tender, non-distended    : Wilkinson in place draining yellow urine    Extremities: b/l short cast to arms    Skin: forehead laceration s/p repair in trauma bay    Musculoskeletal: severe pain to b/l wrists, unable to asses ROM due to casts, b/l LE with slightly limited ROM due to acetabular fracture    LABS:  CBC Full  -  ( 17 Oct 2021 11:50 )  WBC Count : 7.19 K/uL  RBC Count : 4.59 M/uL  Hemoglobin : 13.6 g/dL  Hematocrit : 40.3 %  Platelet Count - Automated : 259 K/uL  Mean Cell Volume : 87.8 fl  Mean Cell Hemoglobin : 29.6 pg  Mean Cell Hemoglobin Concentration : 33.7 gm/dL  Auto Neutrophil # : 4.10 K/uL  Auto Lymphocyte # : 2.35 K/uL  Auto Monocyte # : 0.48 K/uL  Auto Eosinophil # : 0.19 K/uL  Auto Basophil # : 0.03 K/uL  Auto Neutrophil % : 57.0 %  Auto Lymphocyte % : 32.7 %  Auto Monocyte % : 6.7 %  Auto Eosinophil % : 2.6 %  Auto Basophil % : 0.4 %    10-17    139  |  103  |  15.4  ----------------------------<  172<H>  3.5   |  20.0<L>  |  1.09    Ca    8.9      17 Oct 2021 11:50    TPro  7.1  /  Alb  4.2  /  TBili  0.3<L>  /  DBili  x   /  AST  36  /  ALT  39  /  AlkPhos  83  10-    PT/INR - ( 17 Oct 2021 11:50 )   PT: 11.8 sec;   INR: 1.02 ratio      PTT - ( 17 Oct 2021 11:50 )  PTT:26.0 sec  Urinalysis Basic - ( 17 Oct 2021 14:45 )    Color: Pale Yellow / Appearance: Slightly Turbid / S.010 / pH: x  Gluc: x / Ketone: Negative  / Bili: Negative / Urobili: Negative mg/dL   Blood: x / Protein: 30 mg/dL / Nitrite: Negative   Leuk Esterase: Negative / RBC: >50 /HPF / WBC 0-2   Sq Epi: x / Non Sq Epi: Occasional / Bacteria: Negative    RECENT CULTURES:    LIVER FUNCTIONS - ( 17 Oct 2021 11:50 )  Alb: 4.2 g/dL / Pro: 7.1 g/dL / ALK PHOS: 83 U/L / ALT: 39 U/L / AST: 36 U/L / GGT: x           ASSESSMENT/PLAN:  Patient is a 61yM, BIBA as trauma B s/p fall from ladder (approx 25 feet), found to have multiple traumatic injuries including scattered SAH, L SDH, multiple facial fractures, including maxillary sinus and nasal bone fractures, Lefort II fracture, B/L distal radial fractures, possible L ICA dissection on CTA, and R posterior acetabular fracture.    Neuro: q1 hour neuro checks, repeat CT head in AM, MRI / MRA head and neck with T1 Fat to further evaluate possible L ICA dissection, assess pain, pain regimen with acetaminophen and dilaudid PRN. Keppra for seizure ppx - pt reports PMH of seizures.     CV: CT checks noted posterior mediastinal hematoma, no evidence of thoracic aortic injury on CTA chest, no thoracic spine fracture or pneumomediastinum. Pt remains HD stable. Mild lactic acidosis on admission, follow up repeat on AM labs.     Pulm: breathing comfortably on room air, encourage coughing and deep breathing, pulm toilet, IS 10 xhr while awake.    GI/Nutrition: NPO for now, patient failed bedside dysphagia screen. formal speech and swallow evaluation this morning.     /Renal: Place wilkinson catheter for urinary retention, maintenance fluids with NS @ 100, follow up AM labs, replete electrolytes as needed    ID: no issues, monitor for s/sx of infection    Endo: dexamethasone 5 mg IV q 8, FS q6, ISS if needed    Skin: Repositioning for DTI prevention while in bed    Heme/DVT Prophylaxis: SCDs only, no chemical dvt ppx at this time give intracranial hemorrhage. MRI for possible L ICA dissection as stated above. neuro IR recs appreciated    MSK: Orthopedics requesting CT b/l knees and wrists this AM for possible OR planning. F/U with ortho - Unsure if acetabular fracture will be operative or non-op management at this point.     Lines/Tubes: PIVs, wilkinson    Dispo: SICU     24h Events:   Pt admitted to SICU for q1 hour neurochecks and close hemodynamic monitoring. CTA  with L possible ICA dissection. Neuro IR consulting, recommending MRI. No AC at this point as risks outweigh benefits given SDH and SAH. Repeat CT head with stable SAH and SDH. Pt remains lethargic, but arousable. Complaining of severe pain to b/l forearms. Pain regimen increased.     ICU Vital Signs Last 24 Hrs  T(C): 36.9 (18 Oct 2021 04:00), Max: 37.3 (17 Oct 2021 19:00)  T(F): 98.5 (18 Oct 2021 04:00), Max: 99.2 (17 Oct 2021 19:00)  HR: 90 (18 Oct 2021 04:00) (72 - 96)  BP: 154/88 (18 Oct 2021 04:00) (115/72 - 162/81)  BP(mean): 100 (18 Oct 2021 04:00) (84 - 105)  ABP: --  ABP(mean): --  RR: 16 (18 Oct 2021 04:00) (11 - 22)  SpO2: 96% (18 Oct 2021 04:00) (88% - 100%)    I&O's Detail    17 Oct 2021 07:01  -  18 Oct 2021 04:11  --------------------------------------------------------  IN:    IV PiggyBack: 100 mL    IV PiggyBack: 200 mL    sodium chloride 0.9%: 1080 mL  Total IN: 1380 mL    OUT:    Voided (mL): 400 mL  Total OUT: 400 mL    Total NET: 980 mL    MEDICATIONS  (STANDING):  chlorhexidine 4% Liquid 1 Application(s) Topical <User Schedule>  dexAMETHasone  Injectable 5 milliGRAM(s) IV Push every 8 hours  influenza   Vaccine 0.5 milliLiter(s) IntraMuscular once  levETIRAcetam  IVPB 1000 milliGRAM(s) IV Intermittent every 12 hours  sodium chloride 0.9%. 1000 milliLiter(s) (100 mL/Hr) IV Continuous <Continuous>    MEDICATIONS  (PRN):  HYDROmorphone  Injectable 0.5 milliGRAM(s) IV Push every 3 hours PRN moderate to severe pain    Physical Exam:    Gen: Resting in bed, lethargic but easy to arouse    HEENT: PERRL, EOMI    Neurological: Alert and oriented x2/3 without focal deficit, able to move all extremities    Neck: Trachea midline, no evidence of JVD, FROM without pain, neck symmetric    Pulmonary: CTAB with decreased breath sounds at the bases    Cardiovascular: S1S2, regular rate and rhythm    Gastrointestinal: Soft, non-tender, non-distended    : Wilkinson in place draining yellow urine    Extremities: b/l short cast to arms    Skin: forehead laceration s/p repair in trauma bay    Musculoskeletal: severe pain to b/l wrists, unable to asses ROM due to casts, b/l LE with slightly limited ROM due to acetabular fracture    LABS:  CBC Full  -  ( 17 Oct 2021 11:50 )  WBC Count : 7.19 K/uL  RBC Count : 4.59 M/uL  Hemoglobin : 13.6 g/dL  Hematocrit : 40.3 %  Platelet Count - Automated : 259 K/uL  Mean Cell Volume : 87.8 fl  Mean Cell Hemoglobin : 29.6 pg  Mean Cell Hemoglobin Concentration : 33.7 gm/dL  Auto Neutrophil # : 4.10 K/uL  Auto Lymphocyte # : 2.35 K/uL  Auto Monocyte # : 0.48 K/uL  Auto Eosinophil # : 0.19 K/uL  Auto Basophil # : 0.03 K/uL  Auto Neutrophil % : 57.0 %  Auto Lymphocyte % : 32.7 %  Auto Monocyte % : 6.7 %  Auto Eosinophil % : 2.6 %  Auto Basophil % : 0.4 %    10-17    139  |  103  |  15.4  ----------------------------<  172<H>  3.5   |  20.0<L>  |  1.09    Ca    8.9      17 Oct 2021 11:50    TPro  7.1  /  Alb  4.2  /  TBili  0.3<L>  /  DBili  x   /  AST  36  /  ALT  39  /  AlkPhos  83  10-    PT/INR - ( 17 Oct 2021 11:50 )   PT: 11.8 sec;   INR: 1.02 ratio      PTT - ( 17 Oct 2021 11:50 )  PTT:26.0 sec  Urinalysis Basic - ( 17 Oct 2021 14:45 )    Color: Pale Yellow / Appearance: Slightly Turbid / S.010 / pH: x  Gluc: x / Ketone: Negative  / Bili: Negative / Urobili: Negative mg/dL   Blood: x / Protein: 30 mg/dL / Nitrite: Negative   Leuk Esterase: Negative / RBC: >50 /HPF / WBC 0-2   Sq Epi: x / Non Sq Epi: Occasional / Bacteria: Negative    RECENT CULTURES:    LIVER FUNCTIONS - ( 17 Oct 2021 11:50 )  Alb: 4.2 g/dL / Pro: 7.1 g/dL / ALK PHOS: 83 U/L / ALT: 39 U/L / AST: 36 U/L / GGT: x           ASSESSMENT/PLAN:  Patient is a 61yM, BIBA as trauma B s/p fall from ladder (approx 25 feet), found to have multiple traumatic injuries including scattered SAH, L SDH, multiple facial fractures, including maxillary sinus and nasal bone fractures, Lefort II fracture, B/L distal radial fractures, possible L ICA dissection on CTA, and R posterior acetabular fracture.    Neuro: q1 hour neuro checks, repeat CT head in AM, MRI / MRA head and neck with T1 Fat to further evaluate possible L ICA dissection, assess pain, pain regimen with acetaminophen and dilaudid PRN. Keppra for seizure ppx - pt reports PMH of seizures.     CV: CT checks noted posterior mediastinal hematoma, no evidence of thoracic aortic injury on CTA chest, no thoracic spine fracture or pneumomediastinum. Pt remains HD stable. Mild lactic acidosis on admission, follow up repeat on AM labs.     Pulm: breathing comfortably on room air, encourage coughing and deep breathing, pulm toilet, IS 10 xhr while awake.    GI/Nutrition: NPO for now, patient failed bedside dysphagia screen. formal speech and swallow evaluation this morning.     /Renal: Place wilkinson catheter for urinary retention, maintenance fluids with NS @ 100, follow up AM labs, replete electrolytes as needed    ID: no issues, monitor for s/sx of infection    Endo: dexamethasone 5 mg IV q 8, FS q6, ISS if needed    Skin: Repositioning for DTI prevention while in bed    Heme/DVT Prophylaxis: SCDs only, no chemical dvt ppx at this time give intracranial hemorrhage. MRI for possible L ICA dissection as stated above. neuro IR recs appreciated    MSK: Orthopedics requesting CT b/l knees and wrists this AM for possible OR planning. F/U with ortho - Unsure if acetabular fracture will be operative or non-op management at this point. Plastic surgery consulted for facial fractures - as per plastics, [place] II will likely need plating and wiring. Follow up regarding operative plan.    Lines/Tubes: PIVs, wilkinson    Dispo: SICU

## 2021-10-18 NOTE — PROGRESS NOTE ADULT - ATTENDING COMMENTS
Orthopaedic Trauma Surgeon Addendum:    I have personally performed a face-to-face diagnostic evaluation on this patient.  I have reviewed the physician assistant note and agree with the history, exam, and plan of care, except as noted.    Patient has a minimally displaced acetabular fracture in the setting of pre-existing hip arthritis.  Given his head injury, no urgency for surgical intervention and may treat non-op depending on how his clinical course progresses.    Hadley Lozoya MD  Orthopaedic Trauma Surgeon  [facility] [place]

## 2021-10-18 NOTE — CONSULT NOTE ADULT - SUBJECTIVE AND OBJECTIVE BOX
61yM, unknown pmhx, BIBA as trauma B s/p fall from ladder in a tree approx 25 feet, traumatic injuries include scattered SAH - stable on repeat CT scan, maxillary sinus and nasal bone fxs, Lefort II fracture, multiple additional facial fractures, b/l distal radius fractures, R posterior acetabular fracture, a possible mediastinal hematoma, and a possible L ICA dissection. Pt denies prior facial trauma, surgery, changes in vision and numbness. Endorsed feeling like dentition is malaligned.     Allergies    Allergy Status Unknown    Intolerances    PAST MEDICAL & SURGICAL HISTORY:  No pertinent past medical history    Home Medications:  Dilantin: 200 milligram(s) orally once a day (17 Oct 2021 17:34)    MEDICATIONS  (STANDING):  chlorhexidine 4% Liquid 1 Application(s) Topical <User Schedule>  dexAMETHasone  Injectable 5 milliGRAM(s) IV Push every 8 hours  influenza   Vaccine 0.5 milliLiter(s) IntraMuscular once  levETIRAcetam  IVPB 1000 milliGRAM(s) IV Intermittent every 12 hours  sodium chloride 0.9%. 1000 milliLiter(s) (100 mL/Hr) IV Continuous <Continuous>    MEDICATIONS  (PRN):  HYDROmorphone  Injectable 0.5 milliGRAM(s) IV Push every 3 hours PRN moderate to severe pain         EXAM:  CT MAXILLOFACIAL                         EXAM:  CT CERVICAL SPINE                         EXAM:  CT BRAIN                          PROCEDURE DATE:  10/17/2021          INTERPRETATION:  CT OF THE HEAD, MAXILLOFACIAL AND CERVICAL SPINE CT WITHOUT CONTRAST.    CLINICAL INDICATION: Trauma    TECHNIQUE: Volumetric CT acquisition was performed through the brain and reviewed using brain and bone window technique. Axial noncontrast CT scans of maxillofacial region were performed. Sagittal and coronal reconstructions were obtained for both sets of images. Volumetric axial noncontrast images of the cervical spine were reconstructed in the axial, coronal and sagittal planes.  Dose optimization techniques were utilized including kVp/mA modulation along with iterative reconstructions.  3-D/MIPS images were obtained on a different workstation, reviewed and saved in PACS.    COMPARISON: No prior studies have been submitted for comparison.    FINDINGS:  Head CT:    There is an acute skull fracture involving the inner and outer plates of the right frontal sinus and left skull base involving the left sphenoid sinus, base of greater wing. There is subarachnoid hemorrhage involving the basal cisterns, interhemispheric and left sylvian system and to a lesser extent the right sylvian cistern.    There is also an acute subdural hemorrhage involving the left parietotemporal convexity measuring 6 mm in thickness. There is 3 mm thickness acute subdural hemorrhage layering along the right falx cerebelli. There is no midline shift or herniation. The gray-white matter junction is well-preserved.    The ventricles, cisterns and sulci are normal in size, shape and configuration.    The visualized globes are symmetric bilaterally. There is left tympanomastoid effusion in the hypotympanum for which an acute nondisplaced fracture of the left temporal bone cannot be entirely excluded.      Maxillofacial CT:    There is an acute pyramidal fracture involving the medial aspects of the maxillary bone, on the left extending through and through the left antrum and in the right side to involve the anterior wall of the maxillary antrum and lateral pterygoid plate, consistent with LeFort II fracture.    There is an acute fracture coursing horizontally through the frontal bone to include the outer and inner plates of the right frontal sinus with inspissated hemorrhagic secretions. The inner plate fracture on the right is mildly displaced and on the left side is nondisplaced.    There are acute displaced fractures of the nasal bones bilaterally and of the nasal septum. The right side of the acute displaced fracture of the nasal bone is angulated. There are acute fractures of the lamina papyracea bilaterally. There is also an acute fracture of the superior wall of the right orbit.    The acute fracture involving the medial wall of the right maxillary bone traverses through the nasolacrimal apparatus which is mildly displaced in the anterior wall. The acute fracture through the medial wall of the left maxillary sinus traverses the nasolacrimal apparatus anterior wall without displacement.    There is an acute fracture of the fovea ethmoidalis particularly on the right for which CSF leak should be considered.    An acute fracture is noted involving the left sphenoid bone, particularly involving the carotid canal on the left. There is a small amount of pneumocephalus in the prepontine cistern.    The visualized globes are symmetric bilaterally and the lenses are normally situated. There is no preseptal edema or retrobulbar hematoma. The intraconal/extraconal fat, extraocular muscles and optic nerve sheaths are intact.      CT cervical spine:    There is maintenance of the usual cervical lordosis without fracture or malalignment. The pre and paravertebral soft tissues are within the limits of normal.    The spinal canal is adequately patent and there is no significant neural foraminal narrowing.        IMPRESSION:    There is an acute fracture involving the frontal bone which traverse the frontal sinus as detailed with subarachnoid hemorrhage in the basal cisterns and acute subdural hemorrhages as described.    There is no midline shift or herniation.    There is an acute right LeFort fracture II. Complex nasoethmoidal orbital fractures as described in details above.    The skull base fracture involving the left sphenoid bone traverses through the anterior wall of the carotid canal and left clinoid processes, therefore CT angiography of the brain is recommended to exclude vascular dissection.    Acute fracture of the left fovea ethmoidalis ethmoidalis for which CSF leak cannot be entirely excluded.    No cervical spine fracture.    Notification to clinician of alert:    Provider   Dr. TOBIN SHETH was notified about the final results at 10/17/2021 12:10 PM via telephone by neuroradiologist ARNOLDO Haque. Readback confirmation was obtained.      --- End of Report ---                            12.0   13.37 )-----------( 205      ( 18 Oct 2021 04:02 )             36.0     10-18    140  |  106  |  16.6  ----------------------------<  161<H>  4.0   |  25.0  |  0.72    Ca    7.9<L>      18 Oct 2021 04:02  Phos  2.9     10-18  Mg     1.8     10-18    TPro  7.1  /  Alb  4.2  /  TBili  0.3<L>  /  DBili  x   /  AST  36  /  ALT  39  /  AlkPhos  83  10-17      PT/INR - ( 17 Oct 2021 11:50 )   PT: 11.8 sec;   INR: 1.02 ratio         PTT - ( 17 Oct 2021 11:50 )  PTT:26.0 sec    I&O's Detail    17 Oct 2021 07:01  -  18 Oct 2021 07:00  --------------------------------------------------------  IN:    IV PiggyBack: 200 mL    IV PiggyBack: 200 mL    IV PiggyBack: 100 mL    sodium chloride 0.9%: 1380 mL  Total IN: 1880 mL    OUT:    Indwelling Catheter - Urethral (mL): 510 mL    Voided (mL): 400 mL  Total OUT: 910 mL    Total NET: 970 mL      COVID-19 PCR: NotDetec (17 Oct 2021 12:29)      Exam:  Gen: Awake and alert. Conversive and compliant.  HEENT: Facial swelling. Forehead dressing with xeroform removed for exam. Forehead laceration repair with bloody output and baci overlying. Facial swelling R>L. Perinasal/Infraorbital ecchymosis. Nasal bones midline no stepoffs. Dried crusted blood on nasal tip. EOMI. No subconjunctival hemorrhage appreciated. Occlusion stable. V1-V3 intact, infraorbital nerve distribution sensation intact.  Extrem: Upper Extreme wrapped with ace bandage.   Pulm: Breathing comfortably on nasal cannula.   61yM, unknown pmhx, BIBA as trauma B s/p fall from ladder in a tree approx 25 feet, traumatic injuries include scattered SAH - stable on repeat CT scan, maxillary sinus and nasal bone fxs, Lefort II fracture, multiple additional facial fractures, b/l distal radius fractures, R posterior acetabular fracture, a possible mediastinal hematoma, and a possible L ICA dissection. Pt denies prior facial trauma, surgery, changes in vision and numbness. Endorsed feeling like dentition is malaligned.     Allergies    Allergy Status Unknown    Intolerances    PAST MEDICAL & SURGICAL HISTORY:  No pertinent past medical history    Home Medications:  Dilantin: 200 milligram(s) orally once a day (17 Oct 2021 17:34)    MEDICATIONS  (STANDING):  chlorhexidine 4% Liquid 1 Application(s) Topical <User Schedule>  dexAMETHasone  Injectable 5 milliGRAM(s) IV Push every 8 hours  influenza   Vaccine 0.5 milliLiter(s) IntraMuscular once  levETIRAcetam  IVPB 1000 milliGRAM(s) IV Intermittent every 12 hours  sodium chloride 0.9%. 1000 milliLiter(s) (100 mL/Hr) IV Continuous <Continuous>    MEDICATIONS  (PRN):  HYDROmorphone  Injectable 0.5 milliGRAM(s) IV Push every 3 hours PRN moderate to severe pain         EXAM:  CT MAXILLOFACIAL                         EXAM:  CT CERVICAL SPINE                         EXAM:  CT BRAIN                          PROCEDURE DATE:  10/17/2021          INTERPRETATION:  CT OF THE HEAD, MAXILLOFACIAL AND CERVICAL SPINE CT WITHOUT CONTRAST.    CLINICAL INDICATION: Trauma    TECHNIQUE: Volumetric CT acquisition was performed through the brain and reviewed using brain and bone window technique. Axial noncontrast CT scans of maxillofacial region were performed. Sagittal and coronal reconstructions were obtained for both sets of images. Volumetric axial noncontrast images of the cervical spine were reconstructed in the axial, coronal and sagittal planes.  Dose optimization techniques were utilized including kVp/mA modulation along with iterative reconstructions.  3-D/MIPS images were obtained on a different workstation, reviewed and saved in PACS.    COMPARISON: No prior studies have been submitted for comparison.    FINDINGS:  Head CT:    There is an acute skull fracture involving the inner and outer plates of the right frontal sinus and left skull base involving the left sphenoid sinus, base of greater wing. There is subarachnoid hemorrhage involving the basal cisterns, interhemispheric and left sylvian system and to a lesser extent the right sylvian cistern.    There is also an acute subdural hemorrhage involving the left parietotemporal convexity measuring 6 mm in thickness. There is 3 mm thickness acute subdural hemorrhage layering along the right falx cerebelli. There is no midline shift or herniation. The gray-white matter junction is well-preserved.    The ventricles, cisterns and sulci are normal in size, shape and configuration.    The visualized globes are symmetric bilaterally. There is left tympanomastoid effusion in the hypotympanum for which an acute nondisplaced fracture of the left temporal bone cannot be entirely excluded.      Maxillofacial CT:    There is an acute pyramidal fracture involving the medial aspects of the maxillary bone, on the left extending through and through the left antrum and in the right side to involve the anterior wall of the maxillary antrum and lateral pterygoid plate, consistent with LeFort II fracture.    There is an acute fracture coursing horizontally through the frontal bone to include the outer and inner plates of the right frontal sinus with inspissated hemorrhagic secretions. The inner plate fracture on the right is mildly displaced and on the left side is nondisplaced.    There are acute displaced fractures of the nasal bones bilaterally and of the nasal septum. The right side of the acute displaced fracture of the nasal bone is angulated. There are acute fractures of the lamina papyracea bilaterally. There is also an acute fracture of the superior wall of the right orbit.    The acute fracture involving the medial wall of the right maxillary bone traverses through the nasolacrimal apparatus which is mildly displaced in the anterior wall. The acute fracture through the medial wall of the left maxillary sinus traverses the nasolacrimal apparatus anterior wall without displacement.    There is an acute fracture of the fovea ethmoidalis particularly on the right for which CSF leak should be considered.    An acute fracture is noted involving the left sphenoid bone, particularly involving the carotid canal on the left. There is a small amount of pneumocephalus in the prepontine cistern.    The visualized globes are symmetric bilaterally and the lenses are normally situated. There is no preseptal edema or retrobulbar hematoma. The intraconal/extraconal fat, extraocular muscles and optic nerve sheaths are intact.      CT cervical spine:    There is maintenance of the usual cervical lordosis without fracture or malalignment. The pre and paravertebral soft tissues are within the limits of normal.    The spinal canal is adequately patent and there is no significant neural foraminal narrowing.        IMPRESSION:    There is an acute fracture involving the frontal bone which traverse the frontal sinus as detailed with subarachnoid hemorrhage in the basal cisterns and acute subdural hemorrhages as described.    There is no midline shift or herniation.    There is an acute right LeFort fracture II. Complex nasoethmoidal orbital fractures as described in details above.    The skull base fracture involving the left sphenoid bone traverses through the anterior wall of the carotid canal and left clinoid processes, therefore CT angiography of the brain is recommended to exclude vascular dissection.    Acute fracture of the left fovea ethmoidalis ethmoidalis for which CSF leak cannot be entirely excluded.    No cervical spine fracture.    Notification to clinician of alert:    Provider   Dr. TOBIN SHETH was notified about the final results at 10/17/2021 12:10 PM via telephone by neuroradiologist ARNOLDO Haque. Readback confirmation was obtained.      --- End of Report ---    ICU Vital Signs Last 24 Hrs  T(C): 36.7 (18 Oct 2021 08:00), Max: 37.3 (17 Oct 2021 19:00)  T(F): 98.1 (18 Oct 2021 08:00), Max: 99.2 (17 Oct 2021 19:00)  HR: 94 (18 Oct 2021 10:00) (72 - 96)  BP: 180/86 (18 Oct 2021 10:00) (115/72 - 183/82)  BP(mean): 105 (18 Oct 2021 10:00) (84 - 108)  ABP: --  ABP(mean): --  RR: 21 (18 Oct 2021 10:00) (11 - 22)  SpO2: 96% (18 Oct 2021 10:00) (88% - 100%)                            12.0   13.37 )-----------( 205      ( 18 Oct 2021 04:02 )             36.0     10-18    140  |  106  |  16.6  ----------------------------<  161<H>  4.0   |  25.0  |  0.72    Ca    7.9<L>      18 Oct 2021 04:02  Phos  2.9     10-18  Mg     1.8     10-18    TPro  7.1  /  Alb  4.2  /  TBili  0.3<L>  /  DBili  x   /  AST  36  /  ALT  39  /  AlkPhos  83  10-17      PT/INR - ( 17 Oct 2021 11:50 )   PT: 11.8 sec;   INR: 1.02 ratio         PTT - ( 17 Oct 2021 11:50 )  PTT:26.0 sec    I&O's Detail    17 Oct 2021 07:01  -  18 Oct 2021 07:00  --------------------------------------------------------  IN:    IV PiggyBack: 200 mL    IV PiggyBack: 200 mL    IV PiggyBack: 100 mL    sodium chloride 0.9%: 1380 mL  Total IN: 1880 mL    OUT:    Indwelling Catheter - Urethral (mL): 510 mL    Voided (mL): 400 mL  Total OUT: 910 mL    Total NET: 970 mL      COVID-19 PCR: NotDetec (17 Oct 2021 12:29)      Exam:  Gen: Awake and alert. Conversive and compliant.  HEENT: Facial swelling. Forehead dressing with xeroform removed for exam. Forehead laceration repair with bloody output and baci overlying. Facial swelling R>L. Perinasal/Infraorbital ecchymosis. Nasal bones midline no stepoffs. Dried crusted blood on nasal tip. EOMI. No subconjunctival hemorrhage appreciated. Occlusion stable. V1-V3 intact, infraorbital nerve distribution sensation intact.  Extrem: Upper Extreme wrapped with ace bandage.   Pulm: Breathing comfortably on nasal cannula.

## 2021-10-18 NOTE — PROGRESS NOTE ADULT - SUBJECTIVE AND OBJECTIVE BOX
Pt Name: JOAN ESTRELLA    MRN: [id number]    Patient is a being followed for bilateral distal radius fractures, splinted, right posterior acetabular fracture. Pt comfortable in bed at this time, lethargic but arousable. Pt admits to right knee pain, CT scan pending. Pt denies any numbness or tingling.     PAST MEDICAL & SURGICAL HISTORY:  PAST MEDICAL & SURGICAL HISTORY:  No pertinent past medical history        Allergies: Allergy Status Unknown      Medications: acetaminophen  IVPB .. 1000 milliGRAM(s) IV Intermittent once PRN  chlorhexidine 4% Liquid 1 Application(s) Topical <User Schedule>  dexAMETHasone  Injectable 5 milliGRAM(s) IV Push every 8 hours  dextrose 40% Gel 15 Gram(s) Oral once  dextrose 5%. 1000 milliLiter(s) IV Continuous <Continuous>  dextrose 5%. 1000 milliLiter(s) IV Continuous <Continuous>  dextrose 50% Injectable 25 Gram(s) IV Push once  dextrose 50% Injectable 12.5 Gram(s) IV Push once  dextrose 50% Injectable 25 Gram(s) IV Push once  fosphenytoin IVPB 150 milliGRAM(s) PE IV Intermittent every 12 hours  glucagon  Injectable 1 milliGRAM(s) IntraMuscular once  HYDROmorphone  Injectable 1 milliGRAM(s) IV Push every 3 hours PRN  HYDROmorphone  Injectable 0.5 milliGRAM(s) IV Push every 3 hours PRN  influenza   Vaccine 0.5 milliLiter(s) IntraMuscular once  insulin lispro (ADMELOG) corrective regimen sliding scale   SubCutaneous three times a day before meals  pantoprazole  Injectable 40 milliGRAM(s) IV Push daily  sodium chloride 0.9%. 1000 milliLiter(s) IV Continuous <Continuous>                 12.0   13.37 )-----------( 205      ( 18 Oct 2021 04:02 )             36.0     10-18    140  |  106  |  16.6  ----------------------------<  161<H>  4.0   |  25.0  |  0.72    Ca    7.9<L>      18 Oct 2021 04:02  Phos  2.9     10-18  Mg     1.8     10-18    TPro  x   /  Alb  3.5  /  TBili  x   /  DBili  x   /  AST  x   /  ALT  x   /  AlkPhos  x   10-18      PHYSICAL EXAM:    Vital Signs Last 24 Hrs  T(C): 36.7 (18 Oct 2021 08:00), Max: 37.3 (17 Oct 2021 19:00)  T(F): 98.1 (18 Oct 2021 08:00), Max: 99.2 (17 Oct 2021 19:00)  HR: 94 (18 Oct 2021 10:00) (72 - 96)  BP: 180/86 (18 Oct 2021 10:00) (115/72 - 183/82)  BP(mean): 105 (18 Oct 2021 10:00) (84 - 108)  RR: 21 (18 Oct 2021 10:00) (11 - 22)  SpO2: 96% (18 Oct 2021 10:00) (88% - 100%)  Daily Height in cm: 160.02 (17 Oct 2021 16:52)    Daily Weight in k.5 (18 Oct 2021 05:00)    Appearance: Lethargic but arousable, responsive, follows commands.     Neurological: Sensation is grossly intact to light touch. No focal deficits or weaknesses found.       Left Upper Extremity: sugar-tong splint in good position, c/d/i, visible skin intact, +ROM digits, SILT, BCR, no cyanosis       Right Upper Extremity: sugar-tong splint in good position, c/d/i, visible skin intact, +ROM digits, SILT, BCR, no cyanosis       Left Lower Extremity: no obvious deformity, full free ROM, no bony tenderness, skin intact no bleeding, +plantardorsiflexion, +EHL/FHL, SILT, DP2+, BCR, no cyanosis       Right Lower Extremity: no obvious deformity, +knee swelling, +abrasions noted, skin intact no bleeding, +knee pain with ROM, +plantardorsiflexion, +EHL/FHL, SILT, DP2+, BCR, no cyanosis    A/P:  Pt is a  61y Male with bilateral distal radius fractures, splinted. Right posterior acetabular fx    PLAN:   * NWB b/l UE, conitnue splints, elevate - OR later this week with Dr Webb  * [facility] RLE, operative vs non-op acetabular pending results brain MRI  * f/u CT b/l wrist, CT right knee  * Pain Control  * Cont. care per SICU

## 2021-10-18 NOTE — PROGRESS NOTE ADULT - SUBJECTIVE AND OBJECTIVE BOX
INTERVAL HPI/OVERNIGHT EVENTS:  61M, unknown PMHX, BIBA as trauma B s/p fall from ladder ~25ft, c/o head and b/l arm pain. Polytrauma found to have laceration to left forehead, b/l wrist deformities, b/l knee pain. CTH found SAH/SDH/multiple facial fx's  Patient seen and examined this morning with neurosurgical team.     Vital Signs Last 24 Hrs  T(C): 36.7 (18 Oct 2021 08:00), Max: 37.3 (17 Oct 2021 19:00)  T(F): 98.1 (18 Oct 2021 08:00), Max: 99.2 (17 Oct 2021 19:00)  HR: 89 (18 Oct 2021 11:00) (72 - 96)  BP: 185/92 (18 Oct 2021 11:00) (115/72 - 185/92)  BP(mean): 115 (18 Oct 2021 11:00) (84 - 115)  RR: 18 (18 Oct 2021 11:00) (11 - 22)  SpO2: 100% (18 Oct 2021 11:00) (88% - 100%)    PHYSICAL EXAM:  GENERAL: NAD, well-groomed, well-developed  HEAD:  Atraumatic, normocephalic  DRAINS:   WOUND: Dressing clean dry intact  ROMY COMA SCORE: E- V- M- =       E: 4= opens eyes spontaneously 3= to voice 2= to noxious 1= no opening       V: 5= oriented 4= confused 3= inappropriate words 2= incomprehensible sounds 1= nonverbal 1T= intubated       M: 6= follows commands 5= localizes 4= withdraws 3= flexor posturing 2= extensor posturing 1= no movement  MENTAL STATUS: AAO x3; Awake/Comatose; Opens eyes spontaneously/to voice/to light touch/to noxious stimuli; Appropriately conversant without aphasia/Nonverbal; following simple commands/mimicking/not following commands  CRANIAL NERVES: Visual acuity normal for age, visual fields full to confrontation, PERRL. EOMI without nystagmus. Facial sensation intact V1-3 distribution b/l. Face symmetric w/ normal eye closure and smile, tongue midline. Hearing grossly intact. Speech clear. Head turning and shoulder shrug intact.   REFLEXES: PERRL. Corneals intact b/l. Gag intact. Cough intact. Oculocephalic reflex intact (Doll's eye). Negative Hill's b/l. Negative clonus b/l  MOTOR: strength 5/5 b/l upper and lower extremities  Uppers     Delt (C5/6)     Bicep (C5/6)     Wrist Extend (C6)     Tricep (C7)     HG (C8/T1)  R                     5/5                 5/5                         5/5                           5/5                   5/5  L                      5/5                 5/5                         5/5                           5/5                   5/5  Lowers      HF(L1/L2)     KE (L3)     DF (L4)     EHL (L5)     PF (S1)      R                     5/5              5/5           5/5           5/5            5/5  L                     5/5               5/5          5/5            5/5            5/5  SENSATION: grossly intact to light touch all extremities  COORDINATION: Gait intact; rapid alternating movements intact; heel to shin intact; no upper extremity dysmetria  CHEST/LUNG: Clear to auscultation bilaterally; no rales, rhonchi, wheezing, or rubs  HEART: +S1/+S2; Regular rate and rhythm; no murmurs, rubs, or gallops  ABDOMEN: Soft, nontender, nondistended; bowel sounds present all four quadrants  EXTREMITIES:  2+ peripheral pulses, no clubbing, cyanosis, or edema  SKIN: Warm, dry; no rashes or lesions    LABS:                        12.0   13.37 )-----------( 205      ( 18 Oct 2021 04:02 )             36.0     10-18    140  |  106  |  16.6  ----------------------------<  161<H>  4.0   |  25.0  |  0.72    Ca    7.9<L>      18 Oct 2021 04:02  Phos  2.9     10-18  Mg     1.8     10-18    TPro  x   /  Alb  3.5  /  TBili  x   /  DBili  x   /  AST  x   /  ALT  x   /  AlkPhos  x   10-18    PT/INR - ( 17 Oct 2021 11:50 )   PT: 11.8 sec;   INR: 1.02 ratio      PTT - ( 17 Oct 2021 11:50 )  PTT:26.0 sec  Urinalysis Basic - ( 17 Oct 2021 14:45 )    Color: Pale Yellow / Appearance: Slightly Turbid / S.010 / pH: x  Gluc: x / Ketone: Negative  / Bili: Negative / Urobili: Negative mg/dL   Blood: x / Protein: 30 mg/dL / Nitrite: Negative   Leuk Esterase: Negative / RBC: >50 /HPF / WBC 0-2   Sq Epi: x / Non Sq Epi: Occasional / Bacteria: Negative        10-17 @ 07:01  -  10-18 @ 07:00  --------------------------------------------------------  IN: 1880 mL / OUT: 910 mL / NET: 970 mL    10-18 @ :01  -  10-18 @ 11:29  --------------------------------------------------------  IN: 300 mL / OUT: 160 mL / NET: 140 mL        RADIOLOGY & ADDITIONAL TESTS:          CAPRINI SCORE [CLOT]:  Patient has an estimated Caprini score of greater than 5.  However, the patient's unique clinical situation will be addressed in an individual manner to determine appropriate anticoagulation treatment, if any. INTERVAL HPI/OVERNIGHT EVENTS:  61M, unknown PMHX, BIBA as trauma B s/p fall from ladder ~25ft, c/o head and b/l arm pain. Polytrauma found to have laceration to left forehead, b/l wrist deformities, b/l knee pain. CTH found SAH/SDH/multiple facial fx's  Patient seen and examined this morning with neurosurgical team.     Vital Signs Last 24 Hrs  T(C): 36.7 (18 Oct 2021 08:00), Max: 37.3 (17 Oct 2021 19:00)  T(F): 98.1 (18 Oct 2021 08:00), Max: 99.2 (17 Oct 2021 19:00)  HR: 89 (18 Oct 2021 11:00) (72 - 96)  BP: 185/92 (18 Oct 2021 11:00) (115/72 - 185/92)  BP(mean): 115 (18 Oct 2021 11:00) (84 - 115)  RR: 18 (18 Oct 2021 11:00) (11 - 22)  SpO2: 100% (18 Oct 2021 11:00) (88% - 100%)    PHYSICAL EXAM:  GENERAL: NAD, well-groomed, well-developed  HEAD:  Atraumatic, normocephalic  DRAINS:   WOUND: Dressing clean dry intact  ROMY COMA SCORE: E- V- M- =       E: 4= opens eyes spontaneously 3= to voice 2= to noxious 1= no opening       V: 5= oriented 4= confused 3= inappropriate words 2= incomprehensible sounds 1= nonverbal 1T= intubated       M: 6= follows commands 5= localizes 4= withdraws 3= flexor posturing 2= extensor posturing 1= no movement  MENTAL STATUS: AAO x3; Awake/Comatose; Opens eyes spontaneously/to voice/to light touch/to noxious stimuli; Appropriately conversant without aphasia/Nonverbal; following simple commands/mimicking/not following commands  CRANIAL NERVES: Visual acuity normal for age, visual fields full to confrontation, PERRL. EOMI without nystagmus. Facial sensation intact V1-3 distribution b/l. Face symmetric w/ normal eye closure and smile, tongue midline. Hearing grossly intact. Speech clear. Head turning and shoulder shrug intact.   REFLEXES: PERRL. Corneals intact b/l. Gag intact. Cough intact. Oculocephalic reflex intact (Doll's eye). Negative Hill's b/l. Negative clonus b/l  MOTOR: strength 5/5 b/l upper and lower extremities  Uppers     Delt (C5/6)     Bicep (C5/6)     Wrist Extend (C6)     Tricep (C7)     HG (C8/T1)  R                     5/5                 5/5                         5/5                           5/5                   5/5  L                      5/5                 5/5                         5/5                           5/5                   5/5  Lowers      HF(L1/L2)     KE (L3)     DF (L4)     EHL (L5)     PF (S1)      R                     5/5              5/5           5/5           5/5            5/5  L                     5/5               5/5          5/5            5/5            5/5  SENSATION: grossly intact to light touch all extremities  COORDINATION: Gait intact; rapid alternating movements intact; heel to shin intact; no upper extremity dysmetria  CHEST/LUNG: Clear to auscultation bilaterally; no rales, rhonchi, wheezing, or rubs  HEART: +S1/+S2; Regular rate and rhythm; no murmurs, rubs, or gallops  ABDOMEN: Soft, nontender, nondistended; bowel sounds present all four quadrants  EXTREMITIES:  2+ peripheral pulses, no clubbing, cyanosis, or edema  SKIN: Warm, dry; no rashes or lesions    LABS:                        12.0   13.37 )-----------( 205      ( 18 Oct 2021 04:02 )             36.0     10-18    140  |  106  |  16.6  ----------------------------<  161<H>  4.0   |  25.0  |  0.72    Ca    7.9<L>      18 Oct 2021 04:02  Phos  2.9     10-18  Mg     1.8     10-18    TPro  x   /  Alb  3.5  /  TBili  x   /  DBili  x   /  AST  x   /  ALT  x   /  AlkPhos  x   10-18    PT/INR - ( 17 Oct 2021 11:50 )   PT: 11.8 sec;   INR: 1.02 ratio      PTT - ( 17 Oct 2021 11:50 )  PTT:26.0 sec  Urinalysis Basic - ( 17 Oct 2021 14:45 )    Color: Pale Yellow / Appearance: Slightly Turbid / S.010 / pH: x  Gluc: x / Ketone: Negative  / Bili: Negative / Urobili: Negative mg/dL   Blood: x / Protein: 30 mg/dL / Nitrite: Negative   Leuk Esterase: Negative / RBC: >50 /HPF / WBC 0-2   Sq Epi: x / Non Sq Epi: Occasional / Bacteria: Negative        10-17 @ 07:01  -  10-18 @ 07:00  --------------------------------------------------------  IN: 1880 mL / OUT: 910 mL / NET: 970 mL    10-18 @ :  -  10-18 @ 11:29  --------------------------------------------------------  IN: 300 mL / OUT: 160 mL / NET: 140 mL        RADIOLOGY & ADDITIONAL TESTS:  CT Maxillofacial No Cont (10.17.21 @ 12:03)   IMPRESSION:  There is an acute fracture involving the frontal bone which traverse the frontal sinus as detailed with subarachnoid hemorrhage in the basal cisterns and acute subdural hemorrhages as described.  There is no midline shift or herniation.  There is an acute right LeFort fracture II. Complex nasoethmoidal orbital fractures as described in details above.  The skull base fracture involving the left sphenoid bone traverses through the anterior wall of the carotid canal and left clinoid processes, therefore CT angiography of the brain is recommended to exclude vascular dissection.  Acute fracture of the left fovea ethmoidalis ethmoidalis for which CSF leak cannot be entirely excluded.  No cervical spine fracture.       INTERVAL HPI/OVERNIGHT EVENTS:  61M, unknown PMHX, BIBA as trauma B s/p fall from ladder ~25ft, c/o head and b/l arm pain. Polytrauma found to have laceration to left forehead, b/l wrist deformities, b/l knee pain. CTH found SAH/SDH/multiple facial fx's  Patient seen and examined this morning with neurosurgical team. Patient complaining of b/l UE pain, L elbow >>> R elbow. Mild head pain. Denies visual changes, blurriness, nausea, vomiting.     Vital Signs Last 24 Hrs  T(C): 36.7 (18 Oct 2021 08:00), Max: 37.3 (17 Oct 2021 19:00)  T(F): 98.1 (18 Oct 2021 08:00), Max: 99.2 (17 Oct 2021 19:00)  HR: 89 (18 Oct 2021 11:00) (72 - 96)  BP: 185/92 (18 Oct 2021 11:00) (115/72 - 185/92)  BP(mean): 115 (18 Oct 2021 11:00) (84 - 115)  RR: 18 (18 Oct 2021 11:00) (11 - 22)  SpO2: 100% (18 Oct 2021 11:00) (88% - 100%)    PHYSICAL EXAM:  GENERAL: NAD, well-groomed, well-developed  HEAD: +Traumatic, forehead laceration, no active bleeding   WOUND: Dressing w/ dried blood noted, no active bleeding.    ROMY COMA SCORE: E-3 V-4 M-5 = 15  MENTAL STATUS: AAO x3; Awake; Opens eyes spontaneously Appropriately conversant without aphasia; following simple commands  CRANIAL NERVES: Visual acuity normal for age, PERRL. EOMI without nystagmus. Facial sensation intact V1-3 distribution b/l. Face symmetric w/ normal eye closure and smile, tongue midline. Hearing grossly intact. Speech clear.  MOTOR: strength b/l UE limited d/t casts, ~3/5 AG. LLE 4-/5 limited d/t pain. RLE 2/5, unable to AG, PF/DF 5/5.   SENSATION: grossly intact to light touch all extremities  CHEST/LUNG: Nonlabored breaths  HEART: +S1/+S2  SKIN: Warm, dry    LABS:                     12.0   13.37 )-----------( 205      ( 18 Oct 2021 04:02 )             36.0     10-18    140  |  106  |  16.6  ----------------------------<  161<H>  4.0   |  25.0  |  0.72    Ca    7.9<L>      18 Oct 2021 04:02  Phos  2.9     10-18  Mg     1.8     10-18    TPro  x   /  Alb  3.5  /  TBili  x   /  DBili  x   /  AST  x   /  ALT  x   /  AlkPhos  x   10-18    PT/INR - ( 17 Oct 2021 11:50 )   PT: 11.8 sec;   INR: 1.02 ratio      PTT - ( 17 Oct 2021 11:50 )  PTT:26.0 sec  Urinalysis Basic - ( 17 Oct 2021 14:45 )    Color: Pale Yellow / Appearance: Slightly Turbid / S.010 / pH: x  Gluc: x / Ketone: Negative  / Bili: Negative / Urobili: Negative mg/dL   Blood: x / Protein: 30 mg/dL / Nitrite: Negative   Leuk Esterase: Negative / RBC: >50 /HPF / WBC 0-2   Sq Epi: x / Non Sq Epi: Occasional / Bacteria: Negative        10-17 @ :01  -  10-18 @ 07:00  --------------------------------------------------------  IN: 1880 mL / OUT: 910 mL / NET: 970 mL    10-18 @ 07:01  -  10-18 @ 11:29  --------------------------------------------------------  IN: 300 mL / OUT: 160 mL / NET: 140 mL        RADIOLOGY & ADDITIONAL TESTS:  CT Maxillofacial No Cont (10.17.21 @ 12:03)   IMPRESSION:  There is an acute fracture involving the frontal bone which traverse the frontal sinus as detailed with subarachnoid hemorrhage in the basal cisterns and acute subdural hemorrhages as described.  There is no midline shift or herniation.  There is an acute right LeFort fracture II. Complex nasoethmoidal orbital fractures as described in details above.  The skull base fracture involving the left sphenoid bone traverses through the anterior wall of the carotid canal and left clinoid processes, therefore CT angiography of the brain is recommended to exclude vascular dissection.  Acute fracture of the left fovea ethmoidalis ethmoidalis for which CSF leak cannot be entirely excluded.  No cervical spine fracture.

## 2021-10-19 ENCOUNTER — TRANSCRIPTION ENCOUNTER (OUTPATIENT)
Age: 61
End: 2021-10-19

## 2021-10-19 LAB
A1C WITH ESTIMATED AVERAGE GLUCOSE RESULT: 5.5 % — SIGNIFICANT CHANGE UP (ref 4–5.6)
ALBUMIN SERPL ELPH-MCNC: 3.5 G/DL — SIGNIFICANT CHANGE UP (ref 3.3–5.2)
ALP SERPL-CCNC: 50 U/L — SIGNIFICANT CHANGE UP (ref 40–120)
ALT FLD-CCNC: 24 U/L — SIGNIFICANT CHANGE UP
ANION GAP SERPL CALC-SCNC: 9 MMOL/L — SIGNIFICANT CHANGE UP (ref 5–17)
AST SERPL-CCNC: 16 U/L — SIGNIFICANT CHANGE UP
BASOPHILS # BLD AUTO: 0.02 K/UL — SIGNIFICANT CHANGE UP (ref 0–0.2)
BASOPHILS NFR BLD AUTO: 0.1 % — SIGNIFICANT CHANGE UP (ref 0–2)
BILIRUB SERPL-MCNC: 0.3 MG/DL — LOW (ref 0.4–2)
BUN SERPL-MCNC: 14.2 MG/DL — SIGNIFICANT CHANGE UP (ref 8–20)
CALCIUM SERPL-MCNC: 8 MG/DL — LOW (ref 8.6–10.2)
CHLORIDE SERPL-SCNC: 107 MMOL/L — SIGNIFICANT CHANGE UP (ref 98–107)
CO2 SERPL-SCNC: 22 MMOL/L — SIGNIFICANT CHANGE UP (ref 22–29)
CREAT SERPL-MCNC: 0.59 MG/DL — SIGNIFICANT CHANGE UP (ref 0.5–1.3)
EOSINOPHIL # BLD AUTO: 0 K/UL — SIGNIFICANT CHANGE UP (ref 0–0.5)
EOSINOPHIL NFR BLD AUTO: 0 % — SIGNIFICANT CHANGE UP (ref 0–6)
ESTIMATED AVERAGE GLUCOSE: 111 MG/DL — SIGNIFICANT CHANGE UP (ref 68–114)
GLUCOSE BLDC GLUCOMTR-MCNC: 135 MG/DL — HIGH (ref 70–99)
GLUCOSE BLDC GLUCOMTR-MCNC: 150 MG/DL — HIGH (ref 70–99)
GLUCOSE SERPL-MCNC: 146 MG/DL — HIGH (ref 70–99)
HCT VFR BLD CALC: 30.7 % — LOW (ref 39–50)
HCV AB S/CO SERPL IA: 0.11 S/CO — SIGNIFICANT CHANGE UP (ref 0–0.99)
HCV AB SERPL-IMP: SIGNIFICANT CHANGE UP
HGB BLD-MCNC: 10.3 G/DL — LOW (ref 13–17)
IMM GRANULOCYTES NFR BLD AUTO: 0.6 % — SIGNIFICANT CHANGE UP (ref 0–1.5)
LYMPHOCYTES # BLD AUTO: 0.76 K/UL — LOW (ref 1–3.3)
LYMPHOCYTES # BLD AUTO: 5.6 % — LOW (ref 13–44)
MAGNESIUM SERPL-MCNC: 2 MG/DL — SIGNIFICANT CHANGE UP (ref 1.6–2.6)
MCHC RBC-ENTMCNC: 30.5 PG — SIGNIFICANT CHANGE UP (ref 27–34)
MCHC RBC-ENTMCNC: 33.6 GM/DL — SIGNIFICANT CHANGE UP (ref 32–36)
MCV RBC AUTO: 90.8 FL — SIGNIFICANT CHANGE UP (ref 80–100)
MONOCYTES # BLD AUTO: 0.72 K/UL — SIGNIFICANT CHANGE UP (ref 0–0.9)
MONOCYTES NFR BLD AUTO: 5.3 % — SIGNIFICANT CHANGE UP (ref 2–14)
NEUTROPHILS # BLD AUTO: 11.92 K/UL — HIGH (ref 1.8–7.4)
NEUTROPHILS NFR BLD AUTO: 88.4 % — HIGH (ref 43–77)
PHENYTOIN FREE SERPL-MCNC: 4.7 UG/ML — LOW (ref 10–20)
PHOSPHATE SERPL-MCNC: 1.8 MG/DL — LOW (ref 2.4–4.7)
PLATELET # BLD AUTO: 177 K/UL — SIGNIFICANT CHANGE UP (ref 150–400)
POTASSIUM SERPL-MCNC: 4.2 MMOL/L — SIGNIFICANT CHANGE UP (ref 3.5–5.3)
POTASSIUM SERPL-SCNC: 4.2 MMOL/L — SIGNIFICANT CHANGE UP (ref 3.5–5.3)
PROT SERPL-MCNC: 6.2 G/DL — LOW (ref 6.6–8.7)
RBC # BLD: 3.38 M/UL — LOW (ref 4.2–5.8)
RBC # FLD: 12.4 % — SIGNIFICANT CHANGE UP (ref 10.3–14.5)
SODIUM SERPL-SCNC: 138 MMOL/L — SIGNIFICANT CHANGE UP (ref 135–145)
WBC # BLD: 13.5 K/UL — HIGH (ref 3.8–10.5)
WBC # FLD AUTO: 13.5 K/UL — HIGH (ref 3.8–10.5)

## 2021-10-19 PROCEDURE — 99291 CRITICAL CARE FIRST HOUR: CPT

## 2021-10-19 PROCEDURE — 99232 SBSQ HOSP IP/OBS MODERATE 35: CPT

## 2021-10-19 PROCEDURE — 99233 SBSQ HOSP IP/OBS HIGH 50: CPT

## 2021-10-19 PROCEDURE — 73200 CT UPPER EXTREMITY W/O DYE: CPT | Mod: 26,LT,76

## 2021-10-19 PROCEDURE — 70544 MR ANGIOGRAPHY HEAD W/O DYE: CPT | Mod: 26,59

## 2021-10-19 PROCEDURE — 70549 MR ANGIOGRAPH NECK W/O&W/DYE: CPT | Mod: 26

## 2021-10-19 PROCEDURE — 70551 MRI BRAIN STEM W/O DYE: CPT | Mod: 26

## 2021-10-19 RX ORDER — HYDROMORPHONE HYDROCHLORIDE 2 MG/ML
0.5 INJECTION INTRAMUSCULAR; INTRAVENOUS; SUBCUTANEOUS
Refills: 0 | Status: DISCONTINUED | OUTPATIENT
Start: 2021-10-19 | End: 2021-10-21

## 2021-10-19 RX ORDER — CHLORHEXIDINE GLUCONATE 213 G/1000ML
1 SOLUTION TOPICAL DAILY
Refills: 0 | Status: DISCONTINUED | OUTPATIENT
Start: 2021-10-19 | End: 2021-10-21

## 2021-10-19 RX ORDER — ACETAMINOPHEN 500 MG
1000 TABLET ORAL ONCE
Refills: 0 | Status: COMPLETED | OUTPATIENT
Start: 2021-10-19 | End: 2021-10-19

## 2021-10-19 RX ADMIN — CHLORHEXIDINE GLUCONATE 1 APPLICATION(S): 213 SOLUTION TOPICAL at 21:26

## 2021-10-19 RX ADMIN — HYDROMORPHONE HYDROCHLORIDE 0.5 MILLIGRAM(S): 2 INJECTION INTRAMUSCULAR; INTRAVENOUS; SUBCUTANEOUS at 00:15

## 2021-10-19 RX ADMIN — Medication 85 MILLIMOLE(S): at 06:28

## 2021-10-19 RX ADMIN — Medication 1000 MILLIGRAM(S): at 20:58

## 2021-10-19 RX ADMIN — HYDROMORPHONE HYDROCHLORIDE 0.5 MILLIGRAM(S): 2 INJECTION INTRAMUSCULAR; INTRAVENOUS; SUBCUTANEOUS at 17:08

## 2021-10-19 RX ADMIN — HYDROMORPHONE HYDROCHLORIDE 0.5 MILLIGRAM(S): 2 INJECTION INTRAMUSCULAR; INTRAVENOUS; SUBCUTANEOUS at 10:57

## 2021-10-19 RX ADMIN — Medication 1.25 MILLIGRAM(S): at 00:10

## 2021-10-19 RX ADMIN — PANTOPRAZOLE SODIUM 40 MILLIGRAM(S): 20 TABLET, DELAYED RELEASE ORAL at 11:13

## 2021-10-19 RX ADMIN — Medication 1.25 MILLIGRAM(S): at 05:20

## 2021-10-19 RX ADMIN — Medication 5 MILLIGRAM(S): at 21:26

## 2021-10-19 RX ADMIN — Medication 1.25 MILLIGRAM(S): at 17:16

## 2021-10-19 RX ADMIN — Medication 1.25 MILLIGRAM(S): at 11:14

## 2021-10-19 RX ADMIN — FOSPHENYTOIN 106 MILLIGRAM(S) PE: 50 INJECTION INTRAMUSCULAR; INTRAVENOUS at 05:21

## 2021-10-19 RX ADMIN — Medication 5 MILLIGRAM(S): at 05:20

## 2021-10-19 RX ADMIN — HYDROMORPHONE HYDROCHLORIDE 1 MILLIGRAM(S): 2 INJECTION INTRAMUSCULAR; INTRAVENOUS; SUBCUTANEOUS at 03:37

## 2021-10-19 RX ADMIN — HYDROMORPHONE HYDROCHLORIDE 1 MILLIGRAM(S): 2 INJECTION INTRAMUSCULAR; INTRAVENOUS; SUBCUTANEOUS at 04:16

## 2021-10-19 RX ADMIN — FOSPHENYTOIN 106 MILLIGRAM(S) PE: 50 INJECTION INTRAMUSCULAR; INTRAVENOUS at 00:10

## 2021-10-19 RX ADMIN — Medication 400 MILLIGRAM(S): at 20:42

## 2021-10-19 RX ADMIN — Medication 5 MILLIGRAM(S): at 14:42

## 2021-10-19 RX ADMIN — Medication 1 DROP(S): at 00:08

## 2021-10-19 RX ADMIN — CHLORHEXIDINE GLUCONATE 1 APPLICATION(S): 213 SOLUTION TOPICAL at 05:20

## 2021-10-19 RX ADMIN — HYDROMORPHONE HYDROCHLORIDE 0.5 MILLIGRAM(S): 2 INJECTION INTRAMUSCULAR; INTRAVENOUS; SUBCUTANEOUS at 17:22

## 2021-10-19 RX ADMIN — FOSPHENYTOIN 106 MILLIGRAM(S) PE: 50 INJECTION INTRAMUSCULAR; INTRAVENOUS at 17:21

## 2021-10-19 RX ADMIN — Medication 1 DROP(S): at 11:13

## 2021-10-19 RX ADMIN — HYDROMORPHONE HYDROCHLORIDE 1 MILLIGRAM(S): 2 INJECTION INTRAMUSCULAR; INTRAVENOUS; SUBCUTANEOUS at 23:12

## 2021-10-19 RX ADMIN — HYDROMORPHONE HYDROCHLORIDE 0.5 MILLIGRAM(S): 2 INJECTION INTRAMUSCULAR; INTRAVENOUS; SUBCUTANEOUS at 12:12

## 2021-10-19 NOTE — PRE-ANESTHESIA EVALUATION ADULT - NSANTHAIRWAYFT_ENT_ALL_CORE
Pt has ethmoid bone fracture. Will need a tracheostomy as nasal intubation risks are to high in this patient.

## 2021-10-19 NOTE — PROGRESS NOTE ADULT - SUBJECTIVE AND OBJECTIVE BOX
Pt Name: JOAN ESTRELLA    MRN: [id number]      Patient is a being followed for       PAST MEDICAL & SURGICAL HISTORY:  PAST MEDICAL & SURGICAL HISTORY:  No pertinent past medical history        Allergies: Allergy Status Unknown      Medications: artificial  tears Solution 1 Drop(s) Both EYES daily  chlorhexidine 4% Liquid 1 Application(s) Topical <User Schedule>  dexAMETHasone  Injectable 5 milliGRAM(s) IV Push every 8 hours  dextrose 40% Gel 15 Gram(s) Oral once  dextrose 5%. 1000 milliLiter(s) IV Continuous <Continuous>  dextrose 5%. 1000 milliLiter(s) IV Continuous <Continuous>  dextrose 50% Injectable 25 Gram(s) IV Push once  dextrose 50% Injectable 12.5 Gram(s) IV Push once  dextrose 50% Injectable 25 Gram(s) IV Push once  enalaprilat Injectable 1.25 milliGRAM(s) IV Push every 6 hours  fosphenytoin IVPB 150 milliGRAM(s) PE IV Intermittent every 12 hours  glucagon  Injectable 1 milliGRAM(s) IntraMuscular once  HYDROmorphone  Injectable 1 milliGRAM(s) IV Push every 3 hours PRN  HYDROmorphone  Injectable 0.5 milliGRAM(s) IV Push every 3 hours PRN  influenza   Vaccine 0.5 milliLiter(s) IntraMuscular once  insulin lispro (ADMELOG) corrective regimen sliding scale   SubCutaneous three times a day before meals  pantoprazole  Injectable 40 milliGRAM(s) IV Push daily  sodium chloride 0.9%. 1000 milliLiter(s) IV Continuous <Continuous>        Ambulation: Walking independently [ ] With Cane [ ] With Walker [ ]  Bedbound [ ]                           10.3   13.50 )-----------( 177      ( 19 Oct 2021 04:00 )             30.7     10-19    138  |  107  |  14.2  ----------------------------<  146<H>  4.2   |  22.0  |  0.59    Ca    8.0<L>      19 Oct 2021 04:00  Phos  1.8     10-19  Mg     2.0     10-19    TPro  6.2<L>  /  Alb  3.5  /  TBili  0.3<L>  /  DBili  x   /  AST  16  /  ALT  24  /  AlkPhos  50  10-19      PHYSICAL EXAM:    Vital Signs Last 24 Hrs  T(C): 37 (19 Oct 2021 06:00), Max: 37.4 (18 Oct 2021 22:00)  T(F): 98.6 (19 Oct 2021 06:00), Max: 99.3 (18 Oct 2021 22:00)  HR: 77 (19 Oct 2021 07:00) (77 - 118)  BP: 150/75 (19 Oct 2021 07:00) (150/72 - 185/92)  BP(mean): 95 (19 Oct 2021 07:00) (89 - 115)  RR: 14 (19 Oct 2021 07:00) (11 - 21)  SpO2: 97% (19 Oct 2021 07:00) (91% - 100%)  Daily     Daily Weight in k.4 (19 Oct 2021 05:00)    Appearance: responsive, follows commands.     Neurological: Sensation is grossly intact to light touch. No focal deficits or weaknesses found.       Left Upper Extremity: sugar-tong splint in good position, c/d/i, visible skin intact, +ROM digits, SILT, BCR, no cyanosis       Right Upper Extremity: sugar-tong splint in good position, c/d/i, visible skin intact, +ROM digits, SILT, BCR, no cyanosis       Left Lower Extremity: no obvious deformity, full free ROM, no bony tenderness, skin intact no bleeding, +plantardorsiflexion, +EHL/FHL, SILT, DP2+, BCR, no cyanosis       Right Lower Extremity: no obvious deformity, +knee swelling, +abrasions noted, skin intact no bleeding, +knee pain with ROM, +plantardorsiflexion, +EHL/FHL, SILT, DP2+, BCR, no cyanosis    A/P:  Pt is a  61y Male with bilateral distal radius fractures, splinted. Right posterior acetabular fx    PLAN:   * NWB b/l UE, conitnue splints, elevate - OR later this week with Dr Webb  * NWB RLE, operative vs non-op acetabular pending results brain MRI  * Ct R knee neg for fx upon review  * Pain Control  * Cont. care per SICU

## 2021-10-19 NOTE — PROGRESS NOTE ADULT - SUBJECTIVE AND OBJECTIVE BOX
Pt is arousalable to verbal stimuli, and denies any acute complaints at this time.    T, P, R, SpO2, BP    Heart Rate  Heart Rate Heart Rate (beats/min): 77 /min    Noninvasive Blood Pressure  BP Systolic Systolic: 150 mm Hg  BP Diastolic Diastolic (mm Hg): 75 mm Hg  Mean (mm Hg): 95     Respiratory/Pulse Oximetry/Oxygen Therapy  Respiration Rate (breaths/min) Respiration Rate (breaths/min): 14 /min  SpO2 (%) SpO2 (%): 97 %      Physical Exam    Gen: Awake and alert. in no acute distress  HEENT: moderate facial swelling. Forehead dressing with xeroform removed for exam. Forehead laceration repair with bacitracin (nylon sutures are intact) Perinasal/Infraorbital ecchymosis. Nasal bones midline no stepoffs. Dried crusted blood on nasal tip. EOMI. No subconjunctival hemorrhage appreciated. Occlusion stable. V1-V3 intact, infraorbital nerve distribution sensation intact, missing left molar  Extrem: Upper Extremies in b/l splints  Pulm: Breathing comfortably on nasal cannula, equal chest rise     Pt is arousalable to verbal stimuli, and denies any acute complaints at this time.    PAST MEDICAL & SURGICAL HISTORY:  No pertinent past medical history    Allergies: Allergy Status Unknown      Medications: artificial  tears Solution 1 Drop(s) Both EYES daily  chlorhexidine 4% Liquid 1 Application(s) Topical <User Schedule>  dexAMETHasone  Injectable 5 milliGRAM(s) IV Push every 8 hours  dextrose 40% Gel 15 Gram(s) Oral once  dextrose 5%. 1000 milliLiter(s) IV Continuous <Continuous>  dextrose 5%. 1000 milliLiter(s) IV Continuous <Continuous>  dextrose 50% Injectable 25 Gram(s) IV Push once  dextrose 50% Injectable 12.5 Gram(s) IV Push once  dextrose 50% Injectable 25 Gram(s) IV Push once  enalaprilat Injectable 1.25 milliGRAM(s) IV Push every 6 hours  fosphenytoin IVPB 150 milliGRAM(s) PE IV Intermittent every 12 hours  glucagon  Injectable 1 milliGRAM(s) IntraMuscular once  HYDROmorphone  Injectable 1 milliGRAM(s) IV Push every 3 hours PRN  HYDROmorphone  Injectable 0.5 milliGRAM(s) IV Push every 3 hours PRN  influenza   Vaccine 0.5 milliLiter(s) IntraMuscular once  insulin lispro (ADMELOG) corrective regimen sliding scale   SubCutaneous three times a day before meals  pantoprazole  Injectable 40 milliGRAM(s) IV Push daily  sodium chloride 0.9%. 1000 milliLiter(s) IV Continuous <Continuous>                          10.3   13.50 )-----------( 177      ( 19 Oct 2021 04:00 )             30.7     10-    138  |  107  |  14.2  ----------------------------<  146<H>  4.2   |  22.0  |  0.59    Ca    8.0<L>      19 Oct 2021 04:00  Phos  1.8     10-  Mg     2.0     10-19    TPro  6.2<L>  /  Alb  3.5  /  TBili  0.3<L>  /  DBili  x   /  AST  16  /  ALT  24  /  AlkPhos  50  10-19    Vital Signs Last 24 Hrs  T(C): 37 (19 Oct 2021 06:00), Max: 37.4 (18 Oct 2021 22:00)  T(F): 98.6 (19 Oct 2021 06:00), Max: 99.3 (18 Oct 2021 22:00)  HR: 77 (19 Oct 2021 07:00) (77 - 118)  BP: 150/75 (19 Oct 2021 07:00) (150/72 - 185/92)  BP(mean): 95 (19 Oct 2021 07:00) (89 - 115)  RR: 14 (19 Oct 2021 07:00) (11 - 21)  SpO2: 97% (19 Oct 2021 07:00) (91% - 100%)  Daily     Daily Weight in k.4 (19 Oct 2021 05:00)        Physical Exam    Gen: Awake and alert. in no acute distress  HEENT: moderate facial swelling. Forehead dressing with xeroform removed for exam. Forehead laceration repair with bacitracin (nylon sutures are intact) Perinasal/Infraorbital ecchymosis. Nasal bones midline no stepoffs. Dried crusted blood on nasal tip. EOMI. No subconjunctival hemorrhage appreciated. Occlusion stable. V1-V3 intact, infraorbital nerve distribution sensation intact, missing left molar  Extrem: Upper Extremies in b/l splints  Pulm: Breathing comfortably on nasal cannula, equal chest rise

## 2021-10-19 NOTE — PROGRESS NOTE ADULT - ASSESSMENT
62 yo male s/p multiple facial fractures. ORIF repair. Plan for OR Wednesday.  Pt will be wired shut post operatively     -Mechanical soft diet for comfort, then NPO at midnight  -10mg IV decadron q12  -Peridex TID  -PRN Pain control     D/W trauma Team and Dr. Cisneros    Plan discussed w/ pt and  used Language Line [phone])

## 2021-10-19 NOTE — PROGRESS NOTE ADULT - ASSESSMENT
Assessment:   67 yo M who presented after fall from ladder est 25 feet. Patient found to have polytrauma. Neuro IR consulted for possible L ICA dissection read on CTA. Patient's neurologic exam remains stable although limited 2/2 splints and pain.       Plan  - Discussed with Dr. Brandon  - MRI/MRA reviewed, no stroke read, no dissection read  - No need for neuro intervention at this time  - If further neuro IR needs identified, please re-call Assessment:   65 yo M who presented after fall from ladder est 25 feet. Patient found to have polytrauma. Neuro IR consulted for possible L ICA dissection read on CTA. Patient's neurologic exam remains stable although limited 2/2 splints and pain.       Plan  - Discussed with Dr. Brandon  - MRI/MRA reviewed, no stroke read, no dissection   - No need for neuro intervention at this time  - If further neuro IR needs identified, please re-call

## 2021-10-19 NOTE — SWALLOW BEDSIDE ASSESSMENT ADULT - SLP PERTINENT HISTORY OF CURRENT PROBLEM
As per MD note, "61yM, BIBA as trauma B s/p fall from ladder (approx 25 feet), found to have multiple traumatic injuries including scattered SAH, L SDH, multiple facial fractures, including maxillary sinus and nasal bone fractures, Lefort II fracture, B/L distal radial fractures, possible L ICA dissection on CTA, and R posterior acetabular fracture".

## 2021-10-19 NOTE — DIETITIAN INITIAL EVALUATION ADULT. - PERTINENT MEDS FT
MEDICATIONS  (STANDING):  artificial  tears Solution 1 Drop(s) Both EYES daily  chlorhexidine 4% Liquid 1 Application(s) Topical <User Schedule>  dexAMETHasone  Injectable 5 milliGRAM(s) IV Push every 8 hours  dextrose 40% Gel 15 Gram(s) Oral once  dextrose 50% Injectable 25 Gram(s) IV Push once  dextrose 50% Injectable 12.5 Gram(s) IV Push once  dextrose 50% Injectable 25 Gram(s) IV Push once  enalaprilat Injectable 1.25 milliGRAM(s) IV Push every 6 hours  fosphenytoin IVPB 150 milliGRAM(s) PE IV Intermittent every 12 hours  glucagon  Injectable 1 milliGRAM(s) IntraMuscular once  influenza   Vaccine 0.5 milliLiter(s) IntraMuscular once  insulin lispro (ADMELOG) corrective regimen sliding scale   SubCutaneous three times a day before meals  pantoprazole  Injectable 40 milliGRAM(s) IV Push daily  sodium chloride 0.9%. 1000 milliLiter(s) (100 mL/Hr) IV Continuous <Continuous>    MEDICATIONS  (PRN):  HYDROmorphone  Injectable 1 milliGRAM(s) IV Push every 3 hours PRN Severe Pain (7 - 10)  HYDROmorphone  Injectable 0.5 milliGRAM(s) IV Push every 3 hours PRN Moderate Pain (4 - 6)

## 2021-10-19 NOTE — PROGRESS NOTE ADULT - ASSESSMENT
61yM, BIBA as trauma B s/p fall from ladder (approx 25 feet), found to have multiple traumatic injuries including scattered SAH, L SDH, multiple facial fractures, including maxillary sinus and nasal bone fractures, Lefort II fracture, B/L distal radial fractures, possible L ICA dissection on CTA, and R posterior acetabular fracture.    Neuro: q1 hour neuro checks, repeat CT head in AM, MRI / MRA head and neck with T1 Fat to further evaluate possible L ICA dissection, assess pain, pain regimen with acetaminophen and dilaudid PRN. fosphenytoin for seizure ppx - pt reports PMH of seizures.     CV: CT checks noted posterior mediastinal hematoma, no evidence of thoracic aortic injury on CTA chest, no thoracic spine fracture or pneumomediastinum. Pt remains HD stable. Mild lactic acidosis on admission, follow up repeat on AM labs.     Pulm: breathing comfortably on room air, encourage coughing and deep breathing, pulm toilet, IS 10 xhr while awake.    GI/Nutrition: NPO for now, patient failed bedside dysphagia screen. formal speech and swallow evaluation this morning.     /Renal: wilkinson catheter for urinary retention, maintenance fluids with NS @ 100, follow up AM labs, replete electrolytes as needed    ID: no issues, monitor for s/sx of infection    Endo: dexamethasone 5 mg IV q 8, FS q6, ISS if needed    Skin: Repositioning for DTI prevention while in bed    Heme/DVT Prophylaxis: SCDs only, no chemical dvt ppx at this time give intracranial hemorrhage. MRI for possible L ICA dissection as stated above. neuro IR recs appreciated    MSK: Orthopedics requesting CT b/l knees and wrists this AM for possible OR planning. F/U with ortho - Unsure if acetabular fracture will be operative or non-op management at this point. Plastic surgery consulted for facial fractures - as per plastics, [place] II will likely need plating and wiring. Follow up regarding operative plan.    Lines/Tubes: PIVs, wilkinson    Dispo: SICU

## 2021-10-19 NOTE — DIETITIAN INITIAL EVALUATION ADULT. - ENTERAL
Consider initiation of enteral nutrition given failed swallow eval - recommend Pivot 1.5 @ 20 ml/hr and advance 10 ml/hr q4 hrs until goal rate of 50 ml/hr (x20 hrs) to provide 1000 ml, 1500 kcal, 94g protein, 759 ml free water, and 100% of RDIs for vitamins/minerals; additional free water per MD discretion.

## 2021-10-19 NOTE — DIETITIAN INITIAL EVALUATION ADULT. - PERTINENT LABORATORY DATA
10-19 Na138 mmol/L Glu 146 mg/dL<H> K+ 4.2 mmol/L Cr  0.59 mg/dL BUN 14.2 mg/dL Phos 1.8 mg/dL<L> Alb 3.5 g/dL PAB n/a

## 2021-10-19 NOTE — DIETITIAN INITIAL EVALUATION ADULT. - OTHER INFO
61 year old male presents as trauma B s/p fall from ladder (approx 25 feet). Found to have multiple traumatic injuries including: scattered SAH, L SDH, multiple facial fractures, including maxillary sinus and nasal bone fractures, Lefort II fracture, B/L distal radial fractures, possible L ICA dissection on CTA, and R posterior acetabular fracture. Plan for OR Wednesday- aware pt with be wired shut postop. Pt seen by SLP today with recommendations for NPO with consideration for short term non-oral means of nutrition/hydration (per pt/pt family wishes). Pt remains NPO at this time. RD to follow up.

## 2021-10-19 NOTE — PROGRESS NOTE ADULT - ATTENDING COMMENTS
Orthopaedic Trauma Surgeon Addendum:    I have personally performed a face-to-face diagnostic evaluation on this patient.  I have reviewed the physician assistant note and agree with the history, exam, and plan of care, except as noted.    Will follow up on other imaging and determine need for pelvic surgery.     Hadley Lozoya MD  Orthopaedic Trauma Surgeon  [facility]

## 2021-10-19 NOTE — SWALLOW BEDSIDE ASSESSMENT ADULT - SWALLOW EVAL: DIAGNOSIS
Oral stage generally functional for small amounts of puree, despite report of b/l mandibular pain w/oral manipulation. Cannot rule out pharyngeal dysphagia at this time, excessive swallows (x14) with 1/2 tsp puree, with additional report of odynophagia. Eventual throat clear at final trial. No further PO trials provided given current presentation.

## 2021-10-19 NOTE — PROGRESS NOTE ADULT - SUBJECTIVE AND OBJECTIVE BOX
24 HOUR EVENTS:  Started on home seizure medications yesterday. Only complaints during the day was tender left elbow which improved after loosening the splint. No major issues overnight.       NEURO  RASS: 0    GCS: 15      Exam: AAOx3. EOMI, PERRLA  Meds: fosphenytoin IVPB 150 milliGRAM(s) PE IV Intermittent every 12 hours  HYDROmorphone  Injectable 1 milliGRAM(s) IV Push every 3 hours PRN Severe Pain (7 - 10)  HYDROmorphone  Injectable 0.5 milliGRAM(s) IV Push every 3 hours PRN moderate to severe pain        RESPIRATORY  RR: 15 (10-19-21 @ 05:00) (11 - 21)  SpO2: 97% (10-19-21 @ 05:00) (91% - 100%)  Wt(kg): --  Exam: unlabored, clear to auscultation bilaterally      CARDIOVASCULAR  HR: 78 (10-19-21 @ 05:00) (78 - 118)  BP: 161/78 (10-19-21 @ 05:00) (150/72 - 185/92)  BP(mean): 102 (10-19-21 @ 05:00) (89 - 115)  ABP: --  ABP(mean): --  Wt(kg): --  CVP(cm H2O): --  VBG - ( 17 Oct 2021 11:51 )  pH: 7.470 /  pCO2: 32    /  pO2: 69    / HCO3: 23    / Base Excess: -0.4  /  SaO2: 96.7   Lactate: 2.90           Exam:  Cardiac Rhythm:  Perfusion     [x ]Adequate   [ ]Inadequate  Mentation   [ x]Normal       [ ]Reduced  Extremities  [x ]Warm         [ ]Cool  Volume Status [ ]Hypervolemic [x ]Euvolemic [ ]Hypovolemic  Meds: enalaprilat Injectable 1.25 milliGRAM(s) IV Push every 6 hours        GI/NUTRITION  Exam: Soft, NTTP, ND  Diet: NPO / IVF  Meds: pantoprazole  Injectable 40 milliGRAM(s) IV Push daily      GENITOURINARY  I&O's Detail    10-17 @ 07:01  -  10-18 @ 07:00  --------------------------------------------------------  IN:    IV PiggyBack: 200 mL    IV PiggyBack: 200 mL    IV PiggyBack: 100 mL    sodium chloride 0.9%: 1380 mL  Total IN: 1880 mL    OUT:    Indwelling Catheter - Urethral (mL): 510 mL    Voided (mL): 400 mL  Total OUT: 910 mL    Total NET: 970 mL      10-18 @ 07:01  -  10-19 @ 05:57  --------------------------------------------------------  IN:    IV PiggyBack: 50 mL    sodium chloride 0.9%: 2200 mL  Total IN: 2250 mL    OUT:    Indwelling Catheter - Urethral (mL): 2030 mL  Total OUT: 2030 mL    Total NET: 220 mL          10-19    138  |  107  |  14.2  ----------------------------<  146<H>  4.2   |  22.0  |  0.59    Ca    8.0<L>      19 Oct 2021 04:00  Phos  1.8     10-19  Mg     2.0     10-19    TPro  6.2<L>  /  Alb  3.5  /  TBili  0.3<L>  /  DBili  x   /  AST  16  /  ALT  24  /  AlkPhos  50  10-19    [ ] Norris catheter, indication: N/A  Meds: dextrose 5%. 1000 milliLiter(s) IV Continuous <Continuous>  dextrose 5%. 1000 milliLiter(s) IV Continuous <Continuous>  sodium chloride 0.9%. 1000 milliLiter(s) IV Continuous <Continuous>  sodium phosphate IVPB 30 milliMole(s) IV Intermittent once        HEMATOLOGIC  Meds:   [x] VTE Prophylaxis                        10.3   13.50 )-----------( 177      ( 19 Oct 2021 04:00 )             30.7     PT/INR - ( 17 Oct 2021 11:50 )   PT: 11.8 sec;   INR: 1.02 ratio         PTT - ( 17 Oct 2021 11:50 )  PTT:26.0 sec  Transfusion     [ ] PRBC   [ ] Platelets   [ ] FFP   [ ] Cryoprecipitate      INFECTIOUS DISEASES  T(C): 37.4 (10-19-21 @ 04:00), Max: 37.4 (10-18-21 @ 22:00)  Wt(kg): --  WBC Count: 13.50 K/uL (10-19 @ 04:00)    Recent Cultures:    Meds: influenza   Vaccine 0.5 milliLiter(s) IntraMuscular once        ENDOCRINE  Capillary Blood Glucose    Meds: dexAMETHasone  Injectable 5 milliGRAM(s) IV Push every 8 hours  dextrose 40% Gel 15 Gram(s) Oral once  dextrose 50% Injectable 25 Gram(s) IV Push once  dextrose 50% Injectable 12.5 Gram(s) IV Push once  dextrose 50% Injectable 25 Gram(s) IV Push once  glucagon  Injectable 1 milliGRAM(s) IntraMuscular once  insulin lispro (ADMELOG) corrective regimen sliding scale   SubCutaneous three times a day before meals        ACCESS DEVICES:  [x ] Peripheral IV    OTHER MEDICATIONS:  artificial  tears Solution 1 Drop(s) Both EYES daily  chlorhexidine 4% Liquid 1 Application(s) Topical <User Schedule>      CODE STATUS:   Full Code

## 2021-10-19 NOTE — SWALLOW BEDSIDE ASSESSMENT ADULT - SLP GENERAL OBSERVATIONS
Recd awake, partially upright in bed, A&A Ox3, low vocal volume, denies pain, 0/10 pre/post, tolerating RA Spo2 98% throughout

## 2021-10-19 NOTE — DIETITIAN INITIAL EVALUATION ADULT. - REASON FOR ADMISSION
Patient is a being followed for bilateral distal radius fractures, splinted, right posterior acetabular fracture. Pt comfortable in bed at this time. Pt denies any numbness or tingling.

## 2021-10-19 NOTE — PROGRESS NOTE ADULT - SUBJECTIVE AND OBJECTIVE BOX
Patient is a 61y old  Male who presents with a chief complaint of Patient is a being followed for bilateral distal radius fractures, splinted, right posterior acetabular fracture. Pt comfortable in bed at this time. Pt denies any numbness or tingling. (19 Oct 2021 14:23)    HPI:  TRAUMA SURGERY H&P    Admitting surgical team: Trauma Surgery  Admitting attending: Dr. Alvarez  Patient seen and examined: 10/172/2021 1130  HPI:    Patient is a 61yM, unknown pmhx, BIBA as trauma B s/p fall from ladder in a tree approx 25 feet , hemodynamically stable en route, saturating 93% therefor placed on 2 L NC. Upon arrival to trauma bay, patient GCS 14, HD stable, c/o head and b/l arm pain. Primary survey intact, secondary survery pertinent findings include laceration to left forehead, b/l wrist deformities, b/l knee pain. Patient required mouth suctioning for blood causing him to cough/spit up.   Denies cp, sob, n/v/d, abd pain.     PRIMARY SURVEY:  A: Airway intact   B:  bilateral air entry,  CTAB, trachea in midline  C: central and peripheral pulses intact bilaterally   D: GCS 14    E: exposed in a private room in the ED in order to fully examine any injuries     (17 Oct 2021 11:41)      Interval history:  Patient seen and examined by neuro IR team this am. Patient underwent MRI/MRA this morning. Patient reports he is overall feeling well. No acute events reported by staff.       Vital Signs Last 24 Hrs  T(C): 37.3 (19 Oct 2021 16:27), Max: 37.4 (18 Oct 2021 22:00)  T(F): 99.2 (19 Oct 2021 16:27), Max: 99.3 (18 Oct 2021 22:00)  HR: 69 (19 Oct 2021 16:22) (69 - 118)  BP: 140/70 (19 Oct 2021 16:22) (140/70 - 173/85)  BP(mean): 91 (19 Oct 2021 16:22) (85 - 106)  RR: 13 (19 Oct 2021 16:22) (11 - 25)  SpO2: 100% (19 Oct 2021 16:22) (91% - 100%)      Physical Exam:  Constitutional: NAD, lying in bed  Neuro  * Mental Status:  GCS 15: Awake, alert, oriented to conversation. No aphasia or difficulty speaking. No dysarthria.  * Cranial Nerves: Cnii-Cnxii grossly intact. EOMI, no gaze deviation  * Motor: b/l UE wiggles fingers, LUE deltoid function intact, RUE deltoid function limited 2/2 pain, RLE HF limited 2/2 pain, RLE wiggles toes, LLE intact.  * Sensory: Sensation grossly intact  * Reflexes: not assessed       LABS:                        10.3   13.50 )-----------( 177      ( 19 Oct 2021 04:00 )             30.7     10-19    138  |  107  |  14.2  ----------------------------<  146<H>  4.2   |  22.0  |  0.59    Ca    8.0<L>      19 Oct 2021 04:00  Phos  1.8     10-19  Mg     2.0     10-19    TPro  6.2<L>  /  Alb  3.5  /  TBili  0.3<L>  /  DBili  x   /  AST  16  /  ALT  24  /  AlkPhos  50  10-19      Medications:  MEDICATIONS  (STANDING):  artificial  tears Solution 1 Drop(s) Both EYES daily  chlorhexidine 4% Liquid 1 Application(s) Topical <User Schedule>  dexAMETHasone  Injectable 5 milliGRAM(s) IV Push every 8 hours  dextrose 40% Gel 15 Gram(s) Oral once  dextrose 50% Injectable 25 Gram(s) IV Push once  dextrose 50% Injectable 12.5 Gram(s) IV Push once  dextrose 50% Injectable 25 Gram(s) IV Push once  enalaprilat Injectable 1.25 milliGRAM(s) IV Push every 6 hours  fosphenytoin IVPB 150 milliGRAM(s) PE IV Intermittent every 12 hours  glucagon  Injectable 1 milliGRAM(s) IntraMuscular once  influenza   Vaccine 0.5 milliLiter(s) IntraMuscular once  insulin lispro (ADMELOG) corrective regimen sliding scale   SubCutaneous three times a day before meals  pantoprazole  Injectable 40 milliGRAM(s) IV Push daily  sodium chloride 0.9%. 1000 milliLiter(s) (100 mL/Hr) IV Continuous <Continuous>    MEDICATIONS  (PRN):  HYDROmorphone  Injectable 1 milliGRAM(s) IV Push every 3 hours PRN Severe Pain (7 - 10)  HYDROmorphone  Injectable 0.5 milliGRAM(s) IV Push every 3 hours PRN Moderate Pain (4 - 6)      RADIOLOGY & ADDITIONAL STUDIES:  < from: MR Angio Neck w/wo IV Cont (10.19.21 @ 10:01) >  IMPRESSION:    Brain MRI:  1.  Bilateral frontal, parietal occipital, and parafalcine subdural hemorrhages. Subarachnoid hemorrhage along the bilateral cerebral convexities and in the basal cisterns. Bilateral frontal hemorrhagic contusions, right greater than left. Bilateral occipital hemorrhagic contusions. No significant midline shift, hydrocephalus, or effacement of basal cisterns.  2.  Multiple skull, skull base and facial fractures, as seen on prior CTs, raising the possibility of CSF leak.    Brain MRA: No large vessel occlusion or aneurysm.    Neck MRA: No hemodynamically significant stenosis of the bilateral cervical ICAs by NASCET criteria.    --- End of Report ---    APRIL BLACKWELL MD; Attending Radiologist  This document has been electronically signed. Oct 19 2021 10:54AM    < end of copied text >

## 2021-10-19 NOTE — PROGRESS NOTE ADULT - ATTENDING COMMENTS
I personally saw and evaluated the patient during rounds and throughout the day  I agree with note and exam   hd stable, BP better control on IV Vasotec  s/p MRI/MRA  Plan for repair of facial fx by plastic tomorrow, however patient not candidate for nasal intubation due to ethmoid fx  long discussion with family that patient will need tracheostomy, patient himself is in agreement, however family wants to discuss in person this pm  as of now, plan is for possible intubation in am then bedside trach follow by OR with plastic in PM  Patient failed speech and swallow. Case discussed with radiologist no extrinsic compression of the esophagus with the retroperitoneal hematoma to be causing his dysphasia, will re-follow after facial repair  pain well control  on SCD for dvt

## 2021-10-20 ENCOUNTER — TRANSCRIPTION ENCOUNTER (OUTPATIENT)
Age: 61
End: 2021-10-20

## 2021-10-20 LAB
ANION GAP SERPL CALC-SCNC: 15 MMOL/L — SIGNIFICANT CHANGE UP (ref 5–17)
APTT BLD: 23.9 SEC — LOW (ref 27.5–35.5)
BASOPHILS # BLD AUTO: 0.01 K/UL — SIGNIFICANT CHANGE UP (ref 0–0.2)
BASOPHILS NFR BLD AUTO: 0.1 % — SIGNIFICANT CHANGE UP (ref 0–2)
BLD GP AB SCN SERPL QL: SIGNIFICANT CHANGE UP
BUN SERPL-MCNC: 14.6 MG/DL — SIGNIFICANT CHANGE UP (ref 8–20)
CALCIUM SERPL-MCNC: 7.9 MG/DL — LOW (ref 8.6–10.2)
CHLORIDE SERPL-SCNC: 103 MMOL/L — SIGNIFICANT CHANGE UP (ref 98–107)
CO2 SERPL-SCNC: 22 MMOL/L — SIGNIFICANT CHANGE UP (ref 22–29)
CREAT SERPL-MCNC: 0.58 MG/DL — SIGNIFICANT CHANGE UP (ref 0.5–1.3)
EOSINOPHIL # BLD AUTO: 0 K/UL — SIGNIFICANT CHANGE UP (ref 0–0.5)
EOSINOPHIL NFR BLD AUTO: 0 % — SIGNIFICANT CHANGE UP (ref 0–6)
GLUCOSE SERPL-MCNC: 123 MG/DL — HIGH (ref 70–99)
HCT VFR BLD CALC: 30.5 % — LOW (ref 39–50)
HGB BLD-MCNC: 10.3 G/DL — LOW (ref 13–17)
IMM GRANULOCYTES NFR BLD AUTO: 0.5 % — SIGNIFICANT CHANGE UP (ref 0–1.5)
INR BLD: 1.18 RATIO — HIGH (ref 0.88–1.16)
LYMPHOCYTES # BLD AUTO: 0.94 K/UL — LOW (ref 1–3.3)
LYMPHOCYTES # BLD AUTO: 7.6 % — LOW (ref 13–44)
MAGNESIUM SERPL-MCNC: 2.2 MG/DL — SIGNIFICANT CHANGE UP (ref 1.6–2.6)
MCHC RBC-ENTMCNC: 30.5 PG — SIGNIFICANT CHANGE UP (ref 27–34)
MCHC RBC-ENTMCNC: 33.8 GM/DL — SIGNIFICANT CHANGE UP (ref 32–36)
MCV RBC AUTO: 90.2 FL — SIGNIFICANT CHANGE UP (ref 80–100)
MONOCYTES # BLD AUTO: 0.78 K/UL — SIGNIFICANT CHANGE UP (ref 0–0.9)
MONOCYTES NFR BLD AUTO: 6.3 % — SIGNIFICANT CHANGE UP (ref 2–14)
NEUTROPHILS # BLD AUTO: 10.59 K/UL — HIGH (ref 1.8–7.4)
NEUTROPHILS NFR BLD AUTO: 85.5 % — HIGH (ref 43–77)
PHOSPHATE SERPL-MCNC: 2.4 MG/DL — SIGNIFICANT CHANGE UP (ref 2.4–4.7)
PLATELET # BLD AUTO: 178 K/UL — SIGNIFICANT CHANGE UP (ref 150–400)
POTASSIUM SERPL-MCNC: 4.2 MMOL/L — SIGNIFICANT CHANGE UP (ref 3.5–5.3)
POTASSIUM SERPL-SCNC: 4.2 MMOL/L — SIGNIFICANT CHANGE UP (ref 3.5–5.3)
PROTHROM AB SERPL-ACNC: 13.6 SEC — SIGNIFICANT CHANGE UP (ref 10.6–13.6)
RBC # BLD: 3.38 M/UL — LOW (ref 4.2–5.8)
RBC # FLD: 12.1 % — SIGNIFICANT CHANGE UP (ref 10.3–14.5)
SARS-COV-2 RNA SPEC QL NAA+PROBE: DETECTED
SARS-COV-2 RNA SPEC QL NAA+PROBE: DETECTED
SARS-COV-2 RNA SPEC QL NAA+PROBE: SIGNIFICANT CHANGE UP
SODIUM SERPL-SCNC: 140 MMOL/L — SIGNIFICANT CHANGE UP (ref 135–145)
WBC # BLD: 12.38 K/UL — HIGH (ref 3.8–10.5)
WBC # FLD AUTO: 12.38 K/UL — HIGH (ref 3.8–10.5)

## 2021-10-20 PROCEDURE — 71045 X-RAY EXAM CHEST 1 VIEW: CPT | Mod: 26

## 2021-10-20 PROCEDURE — 31500 INSERT EMERGENCY AIRWAY: CPT

## 2021-10-20 PROCEDURE — 31600 PLANNED TRACHEOSTOMY: CPT | Mod: GC

## 2021-10-20 PROCEDURE — 43246 EGD PLACE GASTROSTOMY TUBE: CPT | Mod: GC

## 2021-10-20 PROCEDURE — 99231 SBSQ HOSP IP/OBS SF/LOW 25: CPT

## 2021-10-20 PROCEDURE — 99232 SBSQ HOSP IP/OBS MODERATE 35: CPT

## 2021-10-20 RX ORDER — CHLORHEXIDINE GLUCONATE 213 G/1000ML
15 SOLUTION TOPICAL EVERY 12 HOURS
Refills: 0 | Status: DISCONTINUED | OUTPATIENT
Start: 2021-10-20 | End: 2021-10-21

## 2021-10-20 RX ORDER — FENTANYL CITRATE 50 UG/ML
200 INJECTION INTRAVENOUS ONCE
Refills: 0 | Status: DISCONTINUED | OUTPATIENT
Start: 2021-10-20 | End: 2021-10-20

## 2021-10-20 RX ORDER — FENTANYL CITRATE 50 UG/ML
100 INJECTION INTRAVENOUS ONCE
Refills: 0 | Status: DISCONTINUED | OUTPATIENT
Start: 2021-10-20 | End: 2021-10-20

## 2021-10-20 RX ORDER — CEFAZOLIN SODIUM 1 G
2000 VIAL (EA) INJECTION ONCE
Refills: 0 | Status: COMPLETED | OUTPATIENT
Start: 2021-10-21 | End: 2021-10-21

## 2021-10-20 RX ORDER — PROPOFOL 10 MG/ML
10 INJECTION, EMULSION INTRAVENOUS
Qty: 1000 | Refills: 0 | Status: DISCONTINUED | OUTPATIENT
Start: 2021-10-20 | End: 2021-10-20

## 2021-10-20 RX ADMIN — CHLORHEXIDINE GLUCONATE 15 MILLILITER(S): 213 SOLUTION TOPICAL at 19:30

## 2021-10-20 RX ADMIN — HYDROMORPHONE HYDROCHLORIDE 1 MILLIGRAM(S): 2 INJECTION INTRAMUSCULAR; INTRAVENOUS; SUBCUTANEOUS at 00:00

## 2021-10-20 RX ADMIN — Medication 1.25 MILLIGRAM(S): at 05:37

## 2021-10-20 RX ADMIN — FENTANYL CITRATE 100 MICROGRAM(S): 50 INJECTION INTRAVENOUS at 11:05

## 2021-10-20 RX ADMIN — FOSPHENYTOIN 106 MILLIGRAM(S) PE: 50 INJECTION INTRAMUSCULAR; INTRAVENOUS at 19:31

## 2021-10-20 RX ADMIN — HYDROMORPHONE HYDROCHLORIDE 1 MILLIGRAM(S): 2 INJECTION INTRAMUSCULAR; INTRAVENOUS; SUBCUTANEOUS at 19:57

## 2021-10-20 RX ADMIN — Medication 5 MILLIGRAM(S): at 05:36

## 2021-10-20 RX ADMIN — Medication 1.25 MILLIGRAM(S): at 12:04

## 2021-10-20 RX ADMIN — HYDROMORPHONE HYDROCHLORIDE 1 MILLIGRAM(S): 2 INJECTION INTRAMUSCULAR; INTRAVENOUS; SUBCUTANEOUS at 05:35

## 2021-10-20 RX ADMIN — HYDROMORPHONE HYDROCHLORIDE 1 MILLIGRAM(S): 2 INJECTION INTRAMUSCULAR; INTRAVENOUS; SUBCUTANEOUS at 06:59

## 2021-10-20 RX ADMIN — PANTOPRAZOLE SODIUM 40 MILLIGRAM(S): 20 TABLET, DELAYED RELEASE ORAL at 12:03

## 2021-10-20 RX ADMIN — Medication 1 DROP(S): at 12:23

## 2021-10-20 RX ADMIN — CHLORHEXIDINE GLUCONATE 1 APPLICATION(S): 213 SOLUTION TOPICAL at 12:24

## 2021-10-20 RX ADMIN — Medication 5 MILLIGRAM(S): at 13:40

## 2021-10-20 RX ADMIN — FENTANYL CITRATE 200 MICROGRAM(S): 50 INJECTION INTRAVENOUS at 10:37

## 2021-10-20 RX ADMIN — FOSPHENYTOIN 106 MILLIGRAM(S) PE: 50 INJECTION INTRAMUSCULAR; INTRAVENOUS at 06:42

## 2021-10-20 NOTE — PROGRESS NOTE ADULT - SUBJECTIVE AND OBJECTIVE BOX
61yM BIBA as trauma B s/p fall from ladder in a tree approx 25 feet with TBI and facial fxs     INTERVAL HPI/OVERNIGHT EVENTS:  Tested Covid + this am x 1  Patient denies HA; no CSF rhinorrhea; mild salty taste in mouth     Vital Signs Last 24 Hrs  T(C): 36.9 (20 Oct 2021 07:54), Max: 37.6 (19 Oct 2021 18:55)  T(F): 98.5 (20 Oct 2021 07:54), Max: 99.7 (19 Oct 2021 18:55)  HR: 68 (20 Oct 2021 09:00) (68 - 99)  BP: 132/72 (20 Oct 2021 09:00) (120/64 - 157/74)  BP(mean): 88 (20 Oct 2021 09:00) (77 - 100)  RR: 14 (20 Oct 2021 09:00) (12 - 25)  SpO2: 96% (20 Oct 2021 09:00) (92% - 100%)    PHYSICAL EXAM:  GENERAL: NAD, well-groomed, well-developed  GCS 15   MENTAL STATUS: AAO x3; speech clear   no evidence of csf rhinorrhea   B/l UE casted; B/l LE antigravity   following commands briskly       LABS:                        10.3   12.38 )-----------( 178      ( 20 Oct 2021 05:40 )             30.5     10-20    140  |  103  |  14.6  ----------------------------<  123<H>  4.2   |  22.0  |  0.58    Ca    7.9<L>      20 Oct 2021 05:40  Phos  2.4     10-20  Mg     2.2     10-20    TPro  6.2<L>  /  Alb  3.5  /  TBili  0.3<L>  /  DBili  x   /  AST  16  /  ALT  24  /  AlkPhos  50  10-19    PT/INR - ( 20 Oct 2021 05:40 )   PT: 13.6 sec;   INR: 1.18 ratio         PTT - ( 20 Oct 2021 05:40 )  PTT:23.9 sec      10-19 @ 07:01  -  10-20 @ 07:00  --------------------------------------------------------  IN: 2733.2 mL / OUT: 3030 mL / NET: -296.8 mL    10-20 @ 07:01  -  10-20 @ 10:04  --------------------------------------------------------  IN: 200 mL / OUT: 245 mL / NET: -45 mL    Radiology   Bilateral frontal, parietal occipital, and parafalcine subdural hemorrhages. Subarachnoid hemorrhage along the bilateral cerebral convexities and in the basal cisterns. Bilateral frontal hemorrhagic contusions, right greater than left. Bilateral occipital hemorrhagic contusions. No significant midline shift, hydrocephalus, or effacement of basal cisterns.  2. Multiple skull, skull base and facial fractures, as seen on prior CTs, raising the possibility of CSF leak.    Brain MRA: No large vessel occlusion or aneurysm.    Neck MRA: No hemodynamically significant stenosis of the bilateral cervical ICAs by NASCET criteria.        CAPRINI SCORE [CLOT]:  Patient has an estimated Caprini score of greater than 5.  However, the patient's unique clinical situation will be addressed in an individual manner to determine appropriate anticoagulation treatment, if any.

## 2021-10-20 NOTE — PROGRESS NOTE ADULT - NSPROGADDITIONALINFOA_GEN_ALL_CORE
Orthopaedic Trauma Surgeon Addendum:    I have personally performed a face-to-face diagnostic evaluation on this patient.  I have reviewed the physician assistant note and agree with the history, exam, and plan of care, except as noted. WIll repeat plain films of pelvis this week and make final determination about acetabulum fracture. Continue TTWB. Likely non-op.     Hadley Lozoya MD  Orthopaedic Trauma Surgeon  [facility]

## 2021-10-20 NOTE — PROGRESS NOTE ADULT - ASSESSMENT
60 y/o male s/p fall from ladder with polytrauma   - MRA no evidence of dissection  - continue to monitor for CSF leak   - will continue to follow   - seizure prophylaxis x 7 days   - rpt CTH if any change in exam  - droplet precautions for suspected covid

## 2021-10-20 NOTE — PROGRESS NOTE ADULT - ATTENDING COMMENTS
I personally saw and evaluated the patient during rounds  pre-op screening covid positive. Patient  has been vaccinated  repeat negative  patient placed on isolation for intubation, trach and peg  surgery went successfully  chest xray ordered  on propofol drip for sedation  ivf , npo for or  on lovenox for dvt prophylaxis

## 2021-10-20 NOTE — PROGRESS NOTE ADULT - ASSESSMENT
61yM, BIBA as trauma B s/p fall from ladder (approx 25 feet), found to have multiple traumatic injuries including scattered SAH, L SDH, multiple facial fractures, including maxillary sinus and nasal bone fractures, Lefort II fracture, B/L distal radial fractures, possible L ICA dissection on CTA, and R posterior acetabular fracture.    Neuro: TBI- continue neuro checks, remains hemodynamically well, repeat imaging stable. Continue home dilatinin. Negative BCVI. Plan for OR today.     CV: Monitor any signs of BCI due to hematoma, continue hemodynamic monitoring.     Pulm: breathing comfortably on room air, encourage coughing and deep breathing, pulm toilet, IS 10 xhr while awake.    GI/Nutrition: NPO for now, patient failed bedside dysphagia screen. Will likely require peg tube placement     /Renal: wilkinson catheter for i/o's given procedure today. Start flomax.     ID: no issues, monitor for s/sx of infection    Endo: dexamethasone 5 mg IV q 8, FS q6, ISS if needed    Skin: Repositioning for DTI prevention while in bed    Heme/DVT Prophylaxis: SCDs only, will start dvt ppx today post op 10/20    MSK: NWB to upper ext, plan for OR ; also NWB to lower ext given acetabular fx, awaiting ortho recs for OR for fixation     Lines/Tubes: PIVs, wilkinson    Dispo: SICU

## 2021-10-20 NOTE — PROCEDURE NOTE - ADDITIONAL PROCEDURE DETAILS
Ensure Peg tube is in correct "3" flaquito site. Monitor for movement or migration through stomach wall. Ensure push PEG tube is in correct position with bumper at 3 cm from skin. Monitor for movement or migration through stomach wall.

## 2021-10-20 NOTE — PROGRESS NOTE ADULT - SUBJECTIVE AND OBJECTIVE BOX
24 HOUR EVENTS: Patient neurologically improved. States he has intermittent headache however improves with tylenol. Less impulsive and confused. Remains hemodynamically well. Plan for OR today with plastic surgery, tracheostomy and peg tube placement today. UOP adequate.      SUBJECTIVE/ROS:  [x ] A ten-point review of systems was otherwise negative except as noted.  [ ] Due to altered mental status/intubation, subjective information were not able to be obtained from the patient. History was obtained, to the extent possible, from review of the chart and collateral sources of information.      NEURO  RASS:  0   GCS:  15   [facility] ICU:  Exam: No focal neuro deficits, DOMINGUEZ, follows commands 5/5 strength bilaterally however exam limited upper ext secondary to pain and splint   Meds: fosphenytoin IVPB 150 milliGRAM(s) PE IV Intermittent every 12 hours  HYDROmorphone  Injectable 1 milliGRAM(s) IV Push every 3 hours PRN Severe Pain (7 - 10)  HYDROmorphone  Injectable 0.5 milliGRAM(s) IV Push every 3 hours PRN Moderate Pain (4 - 6)    [x] Adequacy of sedation and pain control has been assessed and adjusted      RESPIRATORY  RR: 16 (10-19-21 @ 23:00) (12 - 25)  SpO2: 97% (10-19-21 @ 23:00) (92% - 100%)  Wt(kg): --  Exam: unlabored, clear to auscultation bilaterally  Mechanical Ventilation:     [ ] Extubation Readiness Assessed  Meds:       CARDIOVASCULAR  HR: 74 (10-19-21 @ 23:00) (69 - 94)  BP: 135/66 (10-19-21 @ 23:00) (124/64 - 161/78)  BP(mean): 84 (10-19-21 @ 23:00) (81 - 102)  ABP: --  ABP(mean): --  Wt(kg): --  CVP(cm H2O): --      Exam: nsr  Cardiac Rhythm: nsr  Perfusion     [ x]Adequate   [ ]Inadequate  Mentation   [ x]Normal       [ ]Reduced  Extremities  [ x]Warm         [ ]Cool  Volume Status [ ]Hypervolemic [ x]Euvolemic [ ]Hypovolemic  Meds: enalaprilat Injectable 1.25 milliGRAM(s) IV Push every 6 hours        GI/NUTRITION  Exam: soft, nttp, nd  Diet: NPO   Meds: pantoprazole  Injectable 40 milliGRAM(s) IV Push daily      GENITOURINARY  I&O's Detail    10-18 @ 07:01  -  10-19 @ 07:00  --------------------------------------------------------  IN:    IV PiggyBack: 100 mL    IV PiggyBack: 166.6 mL    sodium chloride 0.9%: 2400 mL  Total IN: 2666.6 mL    OUT:    Indwelling Catheter - Urethral (mL): 2235 mL  Total OUT: 2235 mL    Total NET: 431.6 mL      10-19 @ 07:01  -  10-20 @ 02:08  --------------------------------------------------------  IN:    IV PiggyBack: 333.2 mL    IV PiggyBack: 50 mL    IV PiggyBack: 100 mL    sodium chloride 0.9%: 1300 mL  Total IN: 1783.2 mL    OUT:    Indwelling Catheter - Urethral (mL): 2110 mL  Total OUT: 2110 mL    Total NET: -326.8 mL        Weight (kg): 67 (10-19 @ 16:22)  10-19    138  |  107  |  14.2  ----------------------------<  146<H>  4.2   |  22.0  |  0.59    Ca    8.0<L>      19 Oct 2021 04:00  Phos  1.8     10-19  Mg     2.0     10-19    TPro  6.2<L>  /  Alb  3.5  /  TBili  0.3<L>  /  DBili  x   /  AST  16  /  ALT  24  /  AlkPhos  50  10-19    [x ] Norris catheter, indication: strict i/o's  Meds: sodium chloride 0.9%. 1000 milliLiter(s) IV Continuous <Continuous>    Ext: bilateral upper ext splints in place    Skin: bilateral periorbital ecchymosis     HEMATOLOGIC  Meds:   [x] VTE Prophylaxis                        10.3   13.50 )-----------( 177      ( 19 Oct 2021 04:00 )             30.7       Transfusion     [ ] PRBC   [ ] Platelets   [ ] FFP   [ ] Cryoprecipitate      INFECTIOUS DISEASES  T(C): 37 (10-19-21 @ 23:41), Max: 37.6 (10-19-21 @ 18:55)  Wt(kg): --  WBC Count: 13.50 K/uL (10-19 @ 04:00)    Recent Cultures:    Meds: influenza   Vaccine 0.5 milliLiter(s) IntraMuscular once        ENDOCRINE  Capillary Blood Glucose    Meds: dexAMETHasone  Injectable 5 milliGRAM(s) IV Push every 8 hours  dextrose 40% Gel 15 Gram(s) Oral once  dextrose 50% Injectable 25 Gram(s) IV Push once  dextrose 50% Injectable 12.5 Gram(s) IV Push once  dextrose 50% Injectable 25 Gram(s) IV Push once  glucagon  Injectable 1 milliGRAM(s) IntraMuscular once  insulin lispro (ADMELOG) corrective regimen sliding scale   SubCutaneous three times a day before meals        ACCESS DEVICES:  [ ] Peripheral IV  [ ] Central Venous Line	[ ] R	[ ] L	[ ] IJ	[ ] Fem	[ ] SC	Placed:   [ ] Arterial Line		[ ] R	[ ] L	[ ] Fem	[ ] Rad	[ ] Ax	Placed:   [ ] PICC:					[ ] Mediport  [ ] Urinary Catheter, Date Placed:   [ ] Necessity of urinary, arterial, and venous catheters discussed    OTHER MEDICATIONS:  artificial  tears Solution 1 Drop(s) Both EYES daily  chlorhexidine 2% Cloths 1 Application(s) Topical daily      CODE STATUS:     IMAGING:

## 2021-10-20 NOTE — BRIEF OPERATIVE NOTE - NSICDXBRIEFPROCEDURE_GEN_ALL_CORE_FT
PROCEDURES:  Percutaneous needle tracheostomy 20-Oct-2021 12:04:55  Konrad Gould  
PROCEDURES:  Open treatment, complex fracture, frontal sinus 20-Oct-2021 23:30:26  Airam Mora  Open reduction, complex Le Fort II fracture, by multiple approaches 20-Oct-2021 23:33:12  Airam Mora

## 2021-10-20 NOTE — PROGRESS NOTE ADULT - ATTENDING COMMENTS
Patient seen and examined. He is a polytrauma s/p fall from tree ~25 feet. Has sustained multiple injuries including bilateral distal radius fractures which are intra-articular and displaced.  Reduction performed in ED and splinted. Patient medically optimized and cleared by SICU. He is COVID+ without symptoms. Indicated for bilateral distal radius ORIF. Risks/benefits/alternatives reviewed with patient and daughter and full consent obtained. No guarantees or reassurances made or implied.

## 2021-10-20 NOTE — PROGRESS NOTE ADULT - SUBJECTIVE AND OBJECTIVE BOX
FACIAL FX NOTE    - Peridex QID  - ABX, medrol dose juan luis  - clear diet 2 days, then transition to full liquid  - do not wet face  - Baci BID to facial laceration, and clean area with water and dial soap  - Keep head of bed elevated

## 2021-10-20 NOTE — BRIEF OPERATIVE NOTE - COMMENTS
- peridex QID  - clear diet 2 days, then full liquid  - abx  - medrol dose juan luis  - do not wet face  - baci BID to facial laceration, and dial soap  - keep head of bed elevated
Wound class II

## 2021-10-20 NOTE — PROGRESS NOTE ADULT - SUBJECTIVE AND OBJECTIVE BOX
Pt Name: JOAN ESTRELLA  MRN: [id number]        Patient is a 61y Male being followed for bilateral distal radius fractures, splinted, right posterior acetabular fracture. Pt comfortable in bed at this time.  Patient is doing well. Pain is controlled with the prescribed medication. Denies CP, SOB, N/V, numbness/tingling. No other orthopedic complaints.     Patient planned for OR today for facial fractures and trach/PEG placement. Covid test this morning positive, repeat test negative. patient asymptomatic.        PAST MEDICAL & SURGICAL HISTORY:  No pertinent past medical history      Vital Signs Last 24 Hrs  T(C): 36.9 (20 Oct 2021 07:54), Max: 37.6 (19 Oct 2021 18:55)  T(F): 98.5 (20 Oct 2021 07:54), Max: 99.7 (19 Oct 2021 18:55)  HR: 68 (20 Oct 2021 09:00) (68 - 99)  BP: 132/72 (20 Oct 2021 09:00) (120/64 - 157/74)  BP(mean): 88 (20 Oct 2021 09:00) (77 - 100)  RR: 14 (20 Oct 2021 09:00) (12 - 25)  SpO2: 96% (20 Oct 2021 09:00) (92% - 100%)  Daily Height in cm: 160.02 (19 Oct 2021 16:22)    Daily                           10.3   12.38 )-----------( 178      ( 20 Oct 2021 05:40 )             30.5     10-20    140  |  103  |  14.6  ----------------------------<  123<H>  4.2   |  22.0  |  0.58    Ca    7.9<L>      20 Oct 2021 05:40  Phos  2.4     10-20  Mg     2.2     10-20    TPro  6.2<L>  /  Alb  3.5  /  TBili  0.3<L>  /  DBili  x   /  AST  16  /  ALT  24  /  AlkPhos  50  10-19      PHYSICAL EXAM:    Appearance: Alert, responsive, in no acute distress.    Musculoskeletal:       Left Upper Extremity: sugar-tong splint in good position, c/d/i, visible skin intact, +ROM digits, SILT, BCR, no cyanosis       Right Upper Extremity: sugar-tong splint in good position, c/d/i, visible skin intact, +ROM digits, SILT, BCR, no cyanosis       Left Lower Extremity: no obvious deformity, full free ROM, no bony tenderness, skin intact no bleeding, +plantar/dorsiflexion, +EHL/FHL, SILT, DP2+, BCR, no cyanosis       Right Lower Extremity: no obvious deformity, +knee swelling, +abrasions noted, skin intact no bleeding, +knee pain with ROM, +plantar/dorsiflexion, +EHL/FHL, SILT, DP2+, BCR, no cyanosis        A/P:  Pt is a 61y Male with bilateral distal radius fractures, splinted, right posterior acetabular fracture.    PLAN:   * Plan for OR with Dr. Webb tomorrow vs next week for distal radius fractures pending surgery today and clearances.     * Continue splints    * Elevate  * Pain control  * DVTp  * Weight Bearing Status: NWB B/L UE, NWB RLE  * rest of care as per SICU team

## 2021-10-21 LAB
ALBUMIN SERPL ELPH-MCNC: 3.5 G/DL — SIGNIFICANT CHANGE UP (ref 3.3–5.2)
ANION GAP SERPL CALC-SCNC: 10 MMOL/L — SIGNIFICANT CHANGE UP (ref 5–17)
ANISOCYTOSIS BLD QL: SLIGHT — SIGNIFICANT CHANGE UP
BASOPHILS # BLD AUTO: 0 K/UL — SIGNIFICANT CHANGE UP (ref 0–0.2)
BASOPHILS NFR BLD AUTO: 0 % — SIGNIFICANT CHANGE UP (ref 0–2)
BUN SERPL-MCNC: 18.8 MG/DL — SIGNIFICANT CHANGE UP (ref 8–20)
CALCIUM SERPL-MCNC: 7.7 MG/DL — LOW (ref 8.6–10.2)
CHLORIDE SERPL-SCNC: 109 MMOL/L — HIGH (ref 98–107)
CO2 SERPL-SCNC: 23 MMOL/L — SIGNIFICANT CHANGE UP (ref 22–29)
CREAT SERPL-MCNC: 0.59 MG/DL — SIGNIFICANT CHANGE UP (ref 0.5–1.3)
EOSINOPHIL # BLD AUTO: 0 K/UL — SIGNIFICANT CHANGE UP (ref 0–0.5)
EOSINOPHIL NFR BLD AUTO: 0 % — SIGNIFICANT CHANGE UP (ref 0–6)
GIANT PLATELETS BLD QL SMEAR: PRESENT — SIGNIFICANT CHANGE UP
GLUCOSE BLDC GLUCOMTR-MCNC: 125 MG/DL — HIGH (ref 70–99)
GLUCOSE BLDC GLUCOMTR-MCNC: 147 MG/DL — HIGH (ref 70–99)
GLUCOSE BLDC GLUCOMTR-MCNC: 169 MG/DL — HIGH (ref 70–99)
GLUCOSE SERPL-MCNC: 147 MG/DL — HIGH (ref 70–99)
HCT VFR BLD CALC: 29.7 % — LOW (ref 39–50)
HGB BLD-MCNC: 9.9 G/DL — LOW (ref 13–17)
LYMPHOCYTES # BLD AUTO: 0.44 K/UL — LOW (ref 1–3.3)
LYMPHOCYTES # BLD AUTO: 4.4 % — LOW (ref 13–44)
MAGNESIUM SERPL-MCNC: 2.1 MG/DL — SIGNIFICANT CHANGE UP (ref 1.6–2.6)
MANUAL SMEAR VERIFICATION: SIGNIFICANT CHANGE UP
MCHC RBC-ENTMCNC: 30.3 PG — SIGNIFICANT CHANGE UP (ref 27–34)
MCHC RBC-ENTMCNC: 33.3 GM/DL — SIGNIFICANT CHANGE UP (ref 32–36)
MCV RBC AUTO: 90.8 FL — SIGNIFICANT CHANGE UP (ref 80–100)
MICROCYTES BLD QL: SLIGHT — SIGNIFICANT CHANGE UP
MONOCYTES # BLD AUTO: 0.26 K/UL — SIGNIFICANT CHANGE UP (ref 0–0.9)
MONOCYTES NFR BLD AUTO: 2.6 % — SIGNIFICANT CHANGE UP (ref 2–14)
NEUTROPHILS # BLD AUTO: 9.21 K/UL — HIGH (ref 1.8–7.4)
NEUTROPHILS NFR BLD AUTO: 93 % — HIGH (ref 43–77)
PHOSPHATE SERPL-MCNC: 2.4 MG/DL — SIGNIFICANT CHANGE UP (ref 2.4–4.7)
PLAT MORPH BLD: NORMAL — SIGNIFICANT CHANGE UP
PLATELET # BLD AUTO: 204 K/UL — SIGNIFICANT CHANGE UP (ref 150–400)
POLYCHROMASIA BLD QL SMEAR: SLIGHT — SIGNIFICANT CHANGE UP
POTASSIUM SERPL-MCNC: 4.3 MMOL/L — SIGNIFICANT CHANGE UP (ref 3.5–5.3)
POTASSIUM SERPL-SCNC: 4.3 MMOL/L — SIGNIFICANT CHANGE UP (ref 3.5–5.3)
RBC # BLD: 3.27 M/UL — LOW (ref 4.2–5.8)
RBC # FLD: 12.1 % — SIGNIFICANT CHANGE UP (ref 10.3–14.5)
RBC BLD AUTO: ABNORMAL
SODIUM SERPL-SCNC: 142 MMOL/L — SIGNIFICANT CHANGE UP (ref 135–145)
WBC # BLD: 9.9 K/UL — SIGNIFICANT CHANGE UP (ref 3.8–10.5)
WBC # FLD AUTO: 9.9 K/UL — SIGNIFICANT CHANGE UP (ref 3.8–10.5)

## 2021-10-21 PROCEDURE — 72170 X-RAY EXAM OF PELVIS: CPT | Mod: 26

## 2021-10-21 PROCEDURE — 73110 X-RAY EXAM OF WRIST: CPT | Mod: 26,50

## 2021-10-21 PROCEDURE — 99291 CRITICAL CARE FIRST HOUR: CPT

## 2021-10-21 RX ORDER — PANTOPRAZOLE SODIUM 20 MG/1
40 TABLET, DELAYED RELEASE ORAL DAILY
Refills: 0 | Status: DISCONTINUED | OUTPATIENT
Start: 2021-10-21 | End: 2021-10-22

## 2021-10-21 RX ORDER — CEFAZOLIN SODIUM 1 G
2000 VIAL (EA) INJECTION EVERY 8 HOURS
Refills: 0 | Status: COMPLETED | OUTPATIENT
Start: 2021-10-21 | End: 2021-10-21

## 2021-10-21 RX ORDER — CEFAZOLIN SODIUM 1 G
2000 VIAL (EA) INJECTION EVERY 8 HOURS
Refills: 0 | Status: DISCONTINUED | OUTPATIENT
Start: 2021-10-21 | End: 2021-10-21

## 2021-10-21 RX ORDER — CEFAZOLIN SODIUM 1 G
2000 VIAL (EA) INJECTION
Refills: 0 | Status: DISCONTINUED | OUTPATIENT
Start: 2021-10-21 | End: 2021-10-21

## 2021-10-21 RX ORDER — CHLORHEXIDINE GLUCONATE 213 G/1000ML
15 SOLUTION TOPICAL EVERY 12 HOURS
Refills: 0 | Status: DISCONTINUED | OUTPATIENT
Start: 2021-10-21 | End: 2021-10-21

## 2021-10-21 RX ORDER — HYDROMORPHONE HYDROCHLORIDE 2 MG/ML
1 INJECTION INTRAMUSCULAR; INTRAVENOUS; SUBCUTANEOUS EVERY 4 HOURS
Refills: 0 | Status: DISCONTINUED | OUTPATIENT
Start: 2021-10-21 | End: 2021-10-21

## 2021-10-21 RX ORDER — DEXAMETHASONE 0.5 MG/5ML
5 ELIXIR ORAL EVERY 8 HOURS
Refills: 0 | Status: DISCONTINUED | OUTPATIENT
Start: 2021-10-21 | End: 2021-10-22

## 2021-10-21 RX ORDER — ACETAMINOPHEN 500 MG
975 TABLET ORAL EVERY 8 HOURS
Refills: 0 | Status: DISCONTINUED | OUTPATIENT
Start: 2021-10-21 | End: 2021-10-27

## 2021-10-21 RX ORDER — OXYCODONE HYDROCHLORIDE 5 MG/1
5 TABLET ORAL EVERY 6 HOURS
Refills: 0 | Status: DISCONTINUED | OUTPATIENT
Start: 2021-10-21 | End: 2021-10-27

## 2021-10-21 RX ORDER — OXYCODONE HYDROCHLORIDE 5 MG/1
10 TABLET ORAL EVERY 6 HOURS
Refills: 0 | Status: DISCONTINUED | OUTPATIENT
Start: 2021-10-21 | End: 2021-10-21

## 2021-10-21 RX ORDER — ACETAMINOPHEN 500 MG
975 TABLET ORAL EVERY 6 HOURS
Refills: 0 | Status: DISCONTINUED | OUTPATIENT
Start: 2021-10-21 | End: 2021-10-21

## 2021-10-21 RX ORDER — FOSPHENYTOIN 50 MG/ML
150 INJECTION INTRAMUSCULAR; INTRAVENOUS EVERY 12 HOURS
Refills: 0 | Status: DISCONTINUED | OUTPATIENT
Start: 2021-10-21 | End: 2021-10-25

## 2021-10-21 RX ORDER — HYDROMORPHONE HYDROCHLORIDE 2 MG/ML
0.5 INJECTION INTRAMUSCULAR; INTRAVENOUS; SUBCUTANEOUS ONCE
Refills: 0 | Status: DISCONTINUED | OUTPATIENT
Start: 2021-10-21 | End: 2021-10-21

## 2021-10-21 RX ORDER — OXYCODONE HYDROCHLORIDE 5 MG/1
5 TABLET ORAL EVERY 6 HOURS
Refills: 0 | Status: DISCONTINUED | OUTPATIENT
Start: 2021-10-21 | End: 2021-10-21

## 2021-10-21 RX ORDER — INSULIN LISPRO 100/ML
VIAL (ML) SUBCUTANEOUS
Refills: 0 | Status: DISCONTINUED | OUTPATIENT
Start: 2021-10-21 | End: 2021-11-03

## 2021-10-21 RX ORDER — OXYCODONE HYDROCHLORIDE 5 MG/1
10 TABLET ORAL EVERY 6 HOURS
Refills: 0 | Status: DISCONTINUED | OUTPATIENT
Start: 2021-10-21 | End: 2021-10-27

## 2021-10-21 RX ADMIN — FOSPHENYTOIN 106 MILLIGRAM(S) PE: 50 INJECTION INTRAMUSCULAR; INTRAVENOUS at 17:37

## 2021-10-21 RX ADMIN — HYDROMORPHONE HYDROCHLORIDE 1 MILLIGRAM(S): 2 INJECTION INTRAMUSCULAR; INTRAVENOUS; SUBCUTANEOUS at 06:13

## 2021-10-21 RX ADMIN — Medication 1.25 MILLIGRAM(S): at 00:35

## 2021-10-21 RX ADMIN — HYDROMORPHONE HYDROCHLORIDE 0.5 MILLIGRAM(S): 2 INJECTION INTRAMUSCULAR; INTRAVENOUS; SUBCUTANEOUS at 02:48

## 2021-10-21 RX ADMIN — PANTOPRAZOLE SODIUM 40 MILLIGRAM(S): 20 TABLET, DELAYED RELEASE ORAL at 15:38

## 2021-10-21 RX ADMIN — Medication 100 MILLIGRAM(S): at 05:59

## 2021-10-21 RX ADMIN — Medication 5 MILLIGRAM(S): at 21:37

## 2021-10-21 RX ADMIN — Medication 100 MILLIGRAM(S): at 15:37

## 2021-10-21 RX ADMIN — Medication 1.25 MILLIGRAM(S): at 15:24

## 2021-10-21 RX ADMIN — Medication 1.25 MILLIGRAM(S): at 06:05

## 2021-10-21 RX ADMIN — OXYCODONE HYDROCHLORIDE 5 MILLIGRAM(S): 5 TABLET ORAL at 22:00

## 2021-10-21 RX ADMIN — OXYCODONE HYDROCHLORIDE 10 MILLIGRAM(S): 5 TABLET ORAL at 02:49

## 2021-10-21 RX ADMIN — Medication 4 MILLIGRAM(S): at 15:22

## 2021-10-21 RX ADMIN — CHLORHEXIDINE GLUCONATE 15 MILLILITER(S): 213 SOLUTION TOPICAL at 06:04

## 2021-10-21 RX ADMIN — FOSPHENYTOIN 106 MILLIGRAM(S) PE: 50 INJECTION INTRAMUSCULAR; INTRAVENOUS at 05:59

## 2021-10-21 RX ADMIN — Medication 5 MILLIGRAM(S): at 00:34

## 2021-10-21 RX ADMIN — HYDROMORPHONE HYDROCHLORIDE 1 MILLIGRAM(S): 2 INJECTION INTRAMUSCULAR; INTRAVENOUS; SUBCUTANEOUS at 00:32

## 2021-10-21 RX ADMIN — OXYCODONE HYDROCHLORIDE 10 MILLIGRAM(S): 5 TABLET ORAL at 17:39

## 2021-10-21 RX ADMIN — HYDROMORPHONE HYDROCHLORIDE 1 MILLIGRAM(S): 2 INJECTION INTRAMUSCULAR; INTRAVENOUS; SUBCUTANEOUS at 15:22

## 2021-10-21 RX ADMIN — OXYCODONE HYDROCHLORIDE 5 MILLIGRAM(S): 5 TABLET ORAL at 21:37

## 2021-10-21 RX ADMIN — Medication 1.25 MILLIGRAM(S): at 17:40

## 2021-10-21 NOTE — PROGRESS NOTE ADULT - ASSESSMENT
62 y/o M w/ lefort 2/frontal sinus fx (arch bars and wires)    Plan  - Peridex QID  - ABX, medrol dose juan luis  - clear diet 2 days, then transition to full liquid  - do not wet face (nasal splint is on)  - Baci BID to facial laceration, and clean area with water and dial soap  - Keep head of bed elevated

## 2021-10-21 NOTE — BRIEF OPERATIVE NOTE - OPERATION/FINDINGS
Preop Dx: 1. Left markedly comminuted intra-articular displaced distal radius fracture   2. Right markedly comminuted intra-articular distal radius fracture  Postop Dx: Same  Procedures: 1. ORIF left intra-articular distal radius fracture  2. ORIF right intra-articular distal radius fracture  3. Fluoroscopy for fixation of bilateral distal radius fractures
6F Tracheostomy tube placed percutaneously.
See Dr. Cisneros's dictated operative note

## 2021-10-21 NOTE — PROGRESS NOTE ADULT - SUBJECTIVE AND OBJECTIVE BOX
Pt is asleep but arousable to verbal stimuli.  He is s/p ORIF of frontal sinus fx/lefort 2 fx w/ arch bar placement w/ wires and nasal splint/packing.  He offers no acute complaints at this time.    Temp (F): 99.9 Degrees F  Temp (C) Temp (C): 37.7 Degrees C  Temp site Temp Site: oral    Heart Rate  Heart Rate Heart Rate (beats/min): 76 /min    Noninvasive Blood Pressure  BP Systolic Systolic: 142 mm Hg  BP Diastolic Diastolic (mm Hg): 73 mm Hg    Respiratory/Pulse Oximetry/Oxygen Therapy  Respiration Rate (breaths/min) Respiration Rate (breaths/min): 12 /min  SpO2 (%) SpO2 (%): 100 %  O2 Delivery/Oxygen Delivery Method Patient On (Patient Delivery Method): ventilator    Physical exam  General : in no acute distress  HEENT : + trached, nasal splitn intact, right sided nasal packing in place, no active epistaxis at this time, wires intact, occlusion stable, facial sensation intact, incision of forehead is C/D/I w/ no overt signs of infection at this time  Chest:: equal chest rise

## 2021-10-22 ENCOUNTER — TRANSCRIPTION ENCOUNTER (OUTPATIENT)
Age: 61
End: 2021-10-22

## 2021-10-22 LAB
ALBUMIN SERPL ELPH-MCNC: 3.1 G/DL — LOW (ref 3.3–5.2)
ANION GAP SERPL CALC-SCNC: 15 MMOL/L — SIGNIFICANT CHANGE UP (ref 5–17)
BASOPHILS # BLD AUTO: 0 K/UL — SIGNIFICANT CHANGE UP (ref 0–0.2)
BASOPHILS NFR BLD AUTO: 0 % — SIGNIFICANT CHANGE UP (ref 0–2)
BUN SERPL-MCNC: 12.2 MG/DL — SIGNIFICANT CHANGE UP (ref 8–20)
CALCIUM SERPL-MCNC: 7.5 MG/DL — LOW (ref 8.6–10.2)
CHLORIDE SERPL-SCNC: 101 MMOL/L — SIGNIFICANT CHANGE UP (ref 98–107)
CO2 SERPL-SCNC: 21 MMOL/L — LOW (ref 22–29)
CREAT SERPL-MCNC: 0.53 MG/DL — SIGNIFICANT CHANGE UP (ref 0.5–1.3)
EOSINOPHIL # BLD AUTO: 0 K/UL — SIGNIFICANT CHANGE UP (ref 0–0.5)
EOSINOPHIL NFR BLD AUTO: 0 % — SIGNIFICANT CHANGE UP (ref 0–6)
GLUCOSE BLDC GLUCOMTR-MCNC: 135 MG/DL — HIGH (ref 70–99)
GLUCOSE BLDC GLUCOMTR-MCNC: 149 MG/DL — HIGH (ref 70–99)
GLUCOSE BLDC GLUCOMTR-MCNC: 150 MG/DL — HIGH (ref 70–99)
GLUCOSE BLDC GLUCOMTR-MCNC: 161 MG/DL — HIGH (ref 70–99)
GLUCOSE SERPL-MCNC: 156 MG/DL — HIGH (ref 70–99)
HCT VFR BLD CALC: 27.2 % — LOW (ref 39–50)
HGB BLD-MCNC: 9.2 G/DL — LOW (ref 13–17)
IMM GRANULOCYTES NFR BLD AUTO: 0.5 % — SIGNIFICANT CHANGE UP (ref 0–1.5)
LYMPHOCYTES # BLD AUTO: 0.65 K/UL — LOW (ref 1–3.3)
LYMPHOCYTES # BLD AUTO: 7.7 % — LOW (ref 13–44)
MAGNESIUM SERPL-MCNC: 2.4 MG/DL — SIGNIFICANT CHANGE UP (ref 1.6–2.6)
MCHC RBC-ENTMCNC: 30.3 PG — SIGNIFICANT CHANGE UP (ref 27–34)
MCHC RBC-ENTMCNC: 33.8 GM/DL — SIGNIFICANT CHANGE UP (ref 32–36)
MCV RBC AUTO: 89.5 FL — SIGNIFICANT CHANGE UP (ref 80–100)
MONOCYTES # BLD AUTO: 0.57 K/UL — SIGNIFICANT CHANGE UP (ref 0–0.9)
MONOCYTES NFR BLD AUTO: 6.8 % — SIGNIFICANT CHANGE UP (ref 2–14)
NEUTROPHILS # BLD AUTO: 7.15 K/UL — SIGNIFICANT CHANGE UP (ref 1.8–7.4)
NEUTROPHILS NFR BLD AUTO: 85 % — HIGH (ref 43–77)
PHENYTOIN FREE SERPL-MCNC: 3 UG/ML — LOW (ref 10–20)
PHOSPHATE SERPL-MCNC: 1.3 MG/DL — LOW (ref 2.4–4.7)
PLATELET # BLD AUTO: 191 K/UL — SIGNIFICANT CHANGE UP (ref 150–400)
POTASSIUM SERPL-MCNC: 3.8 MMOL/L — SIGNIFICANT CHANGE UP (ref 3.5–5.3)
POTASSIUM SERPL-SCNC: 3.8 MMOL/L — SIGNIFICANT CHANGE UP (ref 3.5–5.3)
RBC # BLD: 3.04 M/UL — LOW (ref 4.2–5.8)
RBC # FLD: 12.2 % — SIGNIFICANT CHANGE UP (ref 10.3–14.5)
SODIUM SERPL-SCNC: 137 MMOL/L — SIGNIFICANT CHANGE UP (ref 135–145)
WBC # BLD: 8.41 K/UL — SIGNIFICANT CHANGE UP (ref 3.8–10.5)
WBC # FLD AUTO: 8.41 K/UL — SIGNIFICANT CHANGE UP (ref 3.8–10.5)

## 2021-10-22 PROCEDURE — 99223 1ST HOSP IP/OBS HIGH 75: CPT | Mod: GC

## 2021-10-22 PROCEDURE — 99232 SBSQ HOSP IP/OBS MODERATE 35: CPT | Mod: 57

## 2021-10-22 PROCEDURE — 99291 CRITICAL CARE FIRST HOUR: CPT

## 2021-10-22 PROCEDURE — 27220 TREAT HIP SOCKET FRACTURE: CPT | Mod: RT

## 2021-10-22 PROCEDURE — 99232 SBSQ HOSP IP/OBS MODERATE 35: CPT

## 2021-10-22 RX ORDER — ALBUTEROL 90 UG/1
AEROSOL, METERED ORAL
Refills: 0 | Status: COMPLETED | OUTPATIENT
Start: 2021-10-22

## 2021-10-22 RX ORDER — HALOPERIDOL DECANOATE 100 MG/ML
5 INJECTION INTRAMUSCULAR ONCE
Refills: 0 | Status: COMPLETED | OUTPATIENT
Start: 2021-10-22 | End: 2021-10-22

## 2021-10-22 RX ORDER — CEPHALEXIN 500 MG
500 CAPSULE ORAL EVERY 12 HOURS
Refills: 0 | Status: COMPLETED | OUTPATIENT
Start: 2021-10-22 | End: 2021-10-29

## 2021-10-22 RX ORDER — ALBUTEROL 90 UG/1
2 AEROSOL, METERED ORAL EVERY 6 HOURS
Refills: 0 | Status: DISCONTINUED | OUTPATIENT
Start: 2021-10-22 | End: 2021-11-03

## 2021-10-22 RX ORDER — DEXAMETHASONE 0.5 MG/5ML
5 ELIXIR ORAL EVERY 8 HOURS
Refills: 0 | Status: DISCONTINUED | OUTPATIENT
Start: 2021-10-22 | End: 2021-10-25

## 2021-10-22 RX ORDER — IPRATROPIUM/ALBUTEROL SULFATE 18-103MCG
3 AEROSOL WITH ADAPTER (GRAM) INHALATION EVERY 6 HOURS
Refills: 0 | Status: DISCONTINUED | OUTPATIENT
Start: 2021-10-22 | End: 2021-10-25

## 2021-10-22 RX ORDER — QUETIAPINE FUMARATE 200 MG/1
50 TABLET, FILM COATED ORAL AT BEDTIME
Refills: 0 | Status: DISCONTINUED | OUTPATIENT
Start: 2021-10-22 | End: 2021-11-03

## 2021-10-22 RX ORDER — LANOLIN ALCOHOL/MO/W.PET/CERES
5 CREAM (GRAM) TOPICAL AT BEDTIME
Refills: 0 | Status: DISCONTINUED | OUTPATIENT
Start: 2021-10-22 | End: 2021-11-03

## 2021-10-22 RX ORDER — CHLORHEXIDINE GLUCONATE 213 G/1000ML
15 SOLUTION TOPICAL
Refills: 0 | Status: DISCONTINUED | OUTPATIENT
Start: 2021-10-22 | End: 2021-11-03

## 2021-10-22 RX ORDER — ENOXAPARIN SODIUM 100 MG/ML
40 INJECTION SUBCUTANEOUS DAILY
Refills: 0 | Status: DISCONTINUED | OUTPATIENT
Start: 2021-10-22 | End: 2021-11-03

## 2021-10-22 RX ADMIN — HALOPERIDOL DECANOATE 5 MILLIGRAM(S): 100 INJECTION INTRAMUSCULAR at 04:54

## 2021-10-22 RX ADMIN — Medication 5 MILLIGRAM(S): at 22:49

## 2021-10-22 RX ADMIN — Medication 5 MILLIGRAM(S): at 13:02

## 2021-10-22 RX ADMIN — Medication 1 DROP(S): at 11:18

## 2021-10-22 RX ADMIN — Medication 1.25 MILLIGRAM(S): at 05:04

## 2021-10-22 RX ADMIN — Medication 5 MILLIGRAM(S): at 05:04

## 2021-10-22 RX ADMIN — Medication 85 MILLIMOLE(S): at 07:34

## 2021-10-22 RX ADMIN — OXYCODONE HYDROCHLORIDE 10 MILLIGRAM(S): 5 TABLET ORAL at 21:00

## 2021-10-22 RX ADMIN — PANTOPRAZOLE SODIUM 40 MILLIGRAM(S): 20 TABLET, DELAYED RELEASE ORAL at 11:13

## 2021-10-22 RX ADMIN — OXYCODONE HYDROCHLORIDE 10 MILLIGRAM(S): 5 TABLET ORAL at 19:58

## 2021-10-22 RX ADMIN — OXYCODONE HYDROCHLORIDE 10 MILLIGRAM(S): 5 TABLET ORAL at 14:30

## 2021-10-22 RX ADMIN — QUETIAPINE FUMARATE 50 MILLIGRAM(S): 200 TABLET, FILM COATED ORAL at 22:31

## 2021-10-22 RX ADMIN — Medication 5 MILLIGRAM(S): at 17:49

## 2021-10-22 RX ADMIN — Medication 1: at 10:30

## 2021-10-22 RX ADMIN — Medication 100 MILLIGRAM(S): at 00:34

## 2021-10-22 RX ADMIN — OXYCODONE HYDROCHLORIDE 10 MILLIGRAM(S): 5 TABLET ORAL at 01:04

## 2021-10-22 RX ADMIN — Medication 1.25 MILLIGRAM(S): at 00:34

## 2021-10-22 RX ADMIN — Medication 1.25 MILLIGRAM(S): at 11:13

## 2021-10-22 RX ADMIN — Medication 975 MILLIGRAM(S): at 17:03

## 2021-10-22 RX ADMIN — FOSPHENYTOIN 106 MILLIGRAM(S) PE: 50 INJECTION INTRAMUSCULAR; INTRAVENOUS at 18:16

## 2021-10-22 RX ADMIN — CHLORHEXIDINE GLUCONATE 15 MILLILITER(S): 213 SOLUTION TOPICAL at 17:03

## 2021-10-22 RX ADMIN — Medication 5 MILLIGRAM(S): at 22:31

## 2021-10-22 RX ADMIN — Medication 500 MILLIGRAM(S): at 17:49

## 2021-10-22 RX ADMIN — FOSPHENYTOIN 106 MILLIGRAM(S) PE: 50 INJECTION INTRAMUSCULAR; INTRAVENOUS at 05:03

## 2021-10-22 RX ADMIN — QUETIAPINE FUMARATE 50 MILLIGRAM(S): 200 TABLET, FILM COATED ORAL at 00:34

## 2021-10-22 RX ADMIN — ENOXAPARIN SODIUM 40 MILLIGRAM(S): 100 INJECTION SUBCUTANEOUS at 11:13

## 2021-10-22 NOTE — DISCHARGE NOTE PROVIDER - HOSPITAL COURSE
62 y/o male with unknown PmHx BIBA as trauma B 10/17/21 s/p fall from ladder in a tree approx 25 feet.  Upon arrival to trauma bay, patient GCS 14, HD stable, c/o head and b/l arm pain. Primary survey intact, secondary survery pertinent findings include laceration to left forehead, b/l wrist deformities, b/l knee pain. Patient required mouth suctioning for blood causing him to cough/spit up.   Denies cp, sob, n/v/d, abd pain. 60 y/o male with unknown PmHx BIBA as trauma B 10/17/21 s/p fall from ladder in a tree approx 25 feet.  Upon arrival to trauma bay, patient GCS 14, HD stable, c/o head and b/l arm pain. Primary survey intact, secondary survery pertinent findings include laceration to left forehead, b/l wrist deformities, b/l knee pain. Patient required mouth suctioning for blood causing him to cough/spit up.   Denies cp, sob, n/v/d, abd pain.     CT Head/Max/face/CSpine:  There is an acute fracture involving the frontal bone which traverse the frontal sinus as detailed with subarachnoid hemorrhage in the basal cisterns and acute subdural hemorrhages as described.  There is no midline shift or herniation.  There is an acute right LeFort fracture II. Complex nasoethmoidal orbital fractures as described in details above.  The skull base fracture involving the left sphenoid bone traverses through the anterior wall of the carotid canal and left clinoid processes, therefore CT angiography of the brain is recommended to exclude vascular dissection.  Acute fracture of the left fovea ethmoidalis ethmoidalis for which CSF leak cannot be entirely excluded.  No cervical spine fracture.  Notification to clinician of alert:    CT CAP:  Stranding of the mediastinal fat adjacent to the aorta, esophagus, and azygos vein concerning for mediastinal hematoma. No evidence of active extravasation or acute aortic injury. Findings may reflect venous injury.  Small patchy airspace opacity at the left lung base may reflect small contusion.  Comminuted fracture of the right acetabulum.    CTA Head/Neck:  1. CTA brain: There is no large vessel occlusion or aneurysm involving major proximal intracranial arteries.  2. CTA NECK: Hypodensities in the left internal carotid artery in the petrous portion of the left temporal bone, for which multifocal dissection is difficult to exclude secondary to adjacent streak artifact. Further correlation with MRI of neck is suggested including T1 fat saturated images.  There is no stenosis involving major neck arteries.    MR Neck/Head:  Brain MRI:  1.  Bilateral frontal, parietal occipital, and parafalcine subdural hemorrhages. Subarachnoid hemorrhage along the bilateral cerebral convexities and in the basal cisterns. Bilateral frontal hemorrhagic contusions, right greater than left. Bilateral occipital hemorrhagic contusions. No significant midline shift, hydrocephalus, or effacement of basal cisterns.  2.  Multiple skull, skull base and facial fractures, as seen on prior CTs, raising the possibility of CSF leak.    CT Rt knee: NEG for acute traumatic injury  CT BL wrists:  Rt 1.  Comminuted and impacted fracture of the distal radius with volar angulation the fracture.  2.  Small avulsion fracture at the dorsal distal triquetrum. 3.  Small avulsion fracture at the ulnar styloid process.  4.  Soft tissue swelling.  Lt:  1.  Comminuted mildly depressed fracture the distal radius.  2.  Avulsion fracture of the triquetrum suspect acute on chronic.  3.  Moderate soft tissue swelling about the wrist.  4.  Cystic changes in the lunate and proximal scaphoid likely degenerative.    Pt admitted to SICU.  Pt incidentally noted to be Covid + without acute respiratory symptoms  Ortho, Plastics, Neurosx consulted.  CTA Head/Neck performed given Leforte II.  Pt remained Neurologically intact without deficit.  Pt for Trach/PEG on 10/20 for airway protection during Leforte II fixation.    Postoperatively pt did well, able to wean to TC without difficulty, tolerated TF via PEG.  Ortho recs for acetabular fx non op.  Eval by PT recs for [facility].  Hospital course prolonged by placement 2/2 to Covid + status.   Trach downsized to 6 fenestrated uncoffed trach on 10/27.  Passy bouchra valve placed however need to removed 2/2 to control of secretions.       Injuries to date:      Scattered SAH  L Subdural hematoma    Maxillary sinus and nasal bone fxrs  Lefort III  Nasoethmoid orbital fx  Left fovea ethmoidalis fx  Frontal bone fx  Left sphenoid bone fx  b/l distal radius fx  ?mediastinal hematoma  R Posterior acetabular fx  Urinary retention         OR:     10/20: Trach and PEG  10/20: plated lefmike, wired, R nares packing    10/21: 1. ORIF left intra-articular distal radius fracture  2. ORIF right intra-articular distal radius fracture      Pt tolerating continuous TF via PEG, can transition to bolus feeds as well as liquids via by mouth until cleared by Plastic Surgeon  Decannulate once able to remove wires.  Pt voids, pain well controlled.  Stable for d/c to [facility] today      Length of time preparing discharge > 30 minutes       60 y/o male with unknown PmHx BIBA as trauma B 10/17/21 s/p fall from ladder in a tree approx 25 feet.  Upon arrival to trauma bay, patient GCS 14, HD stable, c/o head and b/l arm pain. Primary survey intact, secondary survery pertinent findings include laceration to left forehead, b/l wrist deformities, b/l knee pain. Patient required mouth suctioning for blood causing him to cough/spit up.   Denies cp, sob, n/v/d, abd pain.     CT Head/Max/face/CSpine:  There is an acute fracture involving the frontal bone which traverse the frontal sinus as detailed with subarachnoid hemorrhage in the basal cisterns and acute subdural hemorrhages as described.  There is no midline shift or herniation.  There is an acute right LeFort fracture II. Complex nasoethmoidal orbital fractures as described in details above.  The skull base fracture involving the left sphenoid bone traverses through the anterior wall of the carotid canal and left clinoid processes, therefore CT angiography of the brain is recommended to exclude vascular dissection.  Acute fracture of the left fovea ethmoidalis ethmoidalis for which CSF leak cannot be entirely excluded.  No cervical spine fracture.  Notification to clinician of alert:    CT CAP:  Stranding of the mediastinal fat adjacent to the aorta, esophagus, and azygos vein concerning for mediastinal hematoma. No evidence of active extravasation or acute aortic injury. Findings may reflect venous injury.  Small patchy airspace opacity at the left lung base may reflect small contusion.  Comminuted fracture of the right acetabulum.    CTA Head/Neck:  1. CTA brain: There is no large vessel occlusion or aneurysm involving major proximal intracranial arteries.  2. CTA NECK: Hypodensities in the left internal carotid artery in the petrous portion of the left temporal bone, for which multifocal dissection is difficult to exclude secondary to adjacent streak artifact. Further correlation with MRI of neck is suggested including T1 fat saturated images.  There is no stenosis involving major neck arteries.    MR Neck/Head:  Brain MRI:  1.  Bilateral frontal, parietal occipital, and parafalcine subdural hemorrhages. Subarachnoid hemorrhage along the bilateral cerebral convexities and in the basal cisterns. Bilateral frontal hemorrhagic contusions, right greater than left. Bilateral occipital hemorrhagic contusions. No significant midline shift, hydrocephalus, or effacement of basal cisterns.  2.  Multiple skull, skull base and facial fractures, as seen on prior CTs, raising the possibility of CSF leak.    CT Rt knee: NEG for acute traumatic injury  CT BL wrists:  Rt 1.  Comminuted and impacted fracture of the distal radius with volar angulation the fracture.  2.  Small avulsion fracture at the dorsal distal triquetrum. 3.  Small avulsion fracture at the ulnar styloid process.  4.  Soft tissue swelling.  Lt:  1.  Comminuted mildly depressed fracture the distal radius.  2.  Avulsion fracture of the triquetrum suspect acute on chronic.  3.  Moderate soft tissue swelling about the wrist.  4.  Cystic changes in the lunate and proximal scaphoid likely degenerative.    Pt admitted to SICU.  Pt incidentally noted to be Covid + without acute respiratory symptoms  Ortho, Plastics, Neurosx consulted.  CTA Head/Neck performed given Leforte II.  Pt remained Neurologically intact without deficit.  Pt for Trach/PEG on 10/20 for airway protection during Leforte II fixation.    Postoperatively pt did well, able to wean to TC without difficulty, tolerated TF via PEG.  Ortho recs for acetabular fx non op.  Eval by PT /PMR recs for [facility].  PMR recs for Depakote to assist with sleep hygiene.  Hospital course prolonged by placement 2/2 to Covid + status.   Trach downsized to 6 fenestrated uncoffed trach on 10/27.  Passy bouchra valve placed however need to removed 2/2 to control of secretions.       Injuries to date:      Scattered SAH  L Subdural hematoma    Maxillary sinus and nasal bone fxrs  Lefort III  Nasoethmoid orbital fx  Left fovea ethmoidalis fx  Frontal bone fx  Left sphenoid bone fx  b/l distal radius fx  ?mediastinal hematoma  R Posterior acetabular fx  Urinary retention         OR:     10/20: Trach and PEG  10/20: plated lefort, wired, R nares packing    10/21: 1. ORIF left intra-articular distal radius fracture  2. ORIF right intra-articular distal radius fracture      Pt tolerating continuous TF via PEG, can transition to bolus feeds as well as liquids via by mouth until cleared by Plastic Surgeon  Decannulate once able to remove wires.  Pt voids, pain well controlled.  Stable for d/c to [facility] today      Length of time preparing discharge > 30 minutes

## 2021-10-22 NOTE — DISCHARGE NOTE PROVIDER - PROVIDER TOKENS
PROVIDER:[TOKEN:[[id number]]],PROVIDER:[TOKEN:[[id number]]] PROVIDER:[TOKEN:[[id number]]],PROVIDER:[TOKEN:[[id number]]],PROVIDER:[TOKEN:[[id number]]]

## 2021-10-22 NOTE — CONSULT NOTE ADULT - SUBJECTIVE AND OBJECTIVE BOX
61yM was admitted on 10-17    In ED, GCS=    Patient is a 61y old  Male who presents with a chief complaint of ORIF frontal sinus fx and LeForte II fx (20 Oct 2021 23:35)    HPI:  TRAUMA SURGERY H&P    Admitting surgical team: Trauma Surgery  Admitting attending: Dr. Alvarez  Patient seen and examined: 10/172/2021 1130  HPI:    Mr. Stanton is a 61 year old male who presented to [facility] on 10/17/21 after falling down from a ladder at his home. Initial GCS was 14. His list of injuries include traumatic SAH, left parietotemporal SDH, Lefort 2 fracture, sphenoid fracture, bilateral distal radius fractures, right acetabular fracture. Patient was also incidentally found to be COVID positive. Patient underwent tracheostomy and PEG tube placement with Dr. Granados on 10/20. The patient also underwent ORIF for his frontal sinus and his Le Fort II fracture with Dr. Cisneros on 10/20 as well. On 10/21, the patient underwent bilateral ORIF with Dr. Webb for his distal radial fractures. He is NWB of both UE. He is also TTWB for his RLE.       Imaging performed:  CT Head/C-spine/Maxillofacial 10/17:  There is an acute fracture involving the frontal bone which traverse the frontal sinus as detailed with subarachnoid hemorrhage in the basal cisterns and acute subdural hemorrhages as described. There is no midline shift or herniation. There is an acute right LeFort fracture II. Complex nasoethmoidal orbital fractures as described in details above. The skull base fracture involving the left sphenoid bone traverses through the anterior wall of the carotid canal and left clinoid processes, therefore CT angiography of the brain is recommended to exclude vascular dissection. Acute fracture of the left fovea ethmoidalis ethmoidalis for which CSF leak cannot be entirely excluded. No cervical spine fracture.    CT C/A/P 10/17: Stranding of the mediastinal fat adjacent to the aorta, esophagus, and azygos vein concerning for mediastinal hematoma. No evidence of active extravasation or acute aortic injury. Findings may reflect venous injury. Small patchy airspace opacity at the left lung base may reflect small contusion. Comminuted fracture of the right acetabulum.    Xray Knee 3 Views, Bilateral 10/17: No acute bony pathology.    Xray Femur 2 Views, Bilat 10/17: No acute bony pathology.    Xray Wrist 3 Views, Bilateral 10/17: Three views of the right wrist and three views of the left wrist show comminuted fracture of the distal left radius with comminuted buckle fracture and dorsal angulation of the distal right radius.    Xray Forearm, Bilateral 10/17:  Two views of the right forearm and two views of the left forearm show comminuted fractureof the distal left radius and comminuted buckle fracture of the distal right radius.    Xray Tibia + Fibula 2 Views, Bilateral 10/17:  Two views of the right leg and two views of the left leg show no evidence of fracture nor destructive change. The joint spaces are maintained.    CTA Head 10/17: There is no large vessel occlusion or aneurysm involving major proximal intracranial arteries.    CTA NECK 10/17: Hypodensities in the left internal carotid artery in the petrous portion of the left temporal bone, for which multifocal dissection is difficult to exclude secondary to adjacent streak artifact. Further correlation with MRI of neck is suggested including T1 fat saturated images. There is no stenosis involving major neck arteries.    Xray Wrist 3 Views, Right 10/17: Following reduction fracture segments are in anatomical alignment.  Fracture is stabilized by applied cast.    Xray Hand 3 Views, Left 10/17: There is a comminuted intra-articular fracture distal LEFT radial metaphysis and epiphysis. Carpal bones, metacarpal bones intact. There is a dorsal dislocation of the third DIP joint. Dorsal dislocation of fourth interphalangeal joint. Remaining osseous structures intact.    Xray Hand Post Reduction, Left 10/17: Following reduction distal radius fracture segments are in anatomical alignment. Reduction of dislocated third DIP joint and fourth interphalangeal joint. Cast applied. Reduction of fracture segments and dislocated phalanges as described.    Xray Wrist Post Reduction AP/Lat, Left 10/17: Following reduction fracture segments are in anatomical alignment.  Fracture is stabilized by applied cast.    CT Head 10/17: Slightly increased frontal parenchymal hemorrhage just above the cribriform plate compared with 11:46 AM. Otherwise no change in traumatic subarachnoid and subdural hemorrhage.    Xray Shoulder 2 Views, Left 10/17: No acute radiographic osseous pathology.    CT Knee No Cont, Right 10/18:  1. No acute fracture or dislocation.  2.  Mild osteoarthritis of the patellofemoral compartment of the joint.  3.  Small knee joint effusion.  4.  Subcutaneous edema along the anteromedial aspect of the joint which may be secondary to direct contusion.    Xray Elbow AP + Lateral, Left 10/18: Suggestion of slight CHF. Blunting of the left elbow.    Brain MRI 10/19:  1.  Bilateral frontal, parietal occipital, and parafalcine subdural hemorrhages. Subarachnoid hemorrhage along the bilateral cerebral convexities and in the basal cisterns. Bilateral frontal hemorrhagic contusions, right greater than left. Bilateral occipital hemorrhagic contusions. No significant midline shift, hydrocephalus, or effacement of basal cisterns.  2.  Multiple skull, skull base and facial fractures, as seen on prior CTs, raising the possibility of CSF leak.    Brain MRA 10/19: No large vessel occlusion or aneurysm.    Neck MRA 10/19: No hemodynamically significant stenosis of the bilateral cervical ICAs by NASCET criteria.    CT Wrist No Cont, Left 10/19:  1.  Comminuted mildly depressed fracture the distal radius.  2.  Avulsion fracture of the triquetrum suspect acute on chronic.  3.  Moderate soft tissue swelling about the wrist.  4.  Cystic changes in the lunate and proximal scaphoid likely degenerative.    CT Wrist No Cont, Right 10/19:  1.  Comminuted and impacted fracture of the distal radius with volar angulation the fracture.  2.  Small avulsion fracture at the dorsal distal triquetrum.  3.  Small avulsion fracture at the ulnar styloid process.  4.  Soft tissue swelling.      REVIEW OF SYSTEMS  Constitutional - No fever, No weight loss, No fatigue  HEENT - No eye pain, No visual disturbances, No difficulty hearing, No tinnitus, No vertigo, No neck pain  Respiratory - No cough, No wheezing, No shortness of breath  Cardiovascular - No chest pain, No palpitations  Gastrointestinal - No abdominal pain, No nausea, No vomiting, No diarrhea, No constipation  Genitourinary - No dysuria, No frequency, No hematuria, No incontinence  Neurological - + headaches, No memory loss, + loss of strength, No numbness, No tremors  Skin - No itching, No rashes, No lesions   Endocrine - No temperature intolerance  Musculoskeletal - + joint pain, + joint swelling, + muscle pain  Psychiatric - No depression, No anxiety    VITALS  T(C): 36.8 (10-22-21 @ 08:00), Max: 37.1 (10-21-21 @ 16:30)  HR: 91 (10-22-21 @ 12:00) (68 - 107)  BP: 133/58 (10-22-21 @ 12:00) (133/58 - 189/85)  RR: 18 (10-22-21 @ 12:00) (10 - 22)  SpO2: 95% (10-22-21 @ 12:00) (92% - 100%)  Wt(kg): --    PAST MEDICAL & SURGICAL HISTORY  No pertinent past medical history        SOCIAL HISTORY - as per documentation/history  Smoking - None  EtOH - None  Drugs - None    FUNCTIONAL HISTORY  Lives with wife but has stairs to enter the home. 10 from the front or 5 from the garage. Self-employed. Was independent prior to injury.    CURRENT FUNCTIONAL STATUS  Total A. Pending formal therapy evals    FAMILY HISTORY   No pertinent family history noted.    RECENT LABS - Reviewed  CBC Full  -  ( 22 Oct 2021 02:12 )  WBC Count : 8.41 K/uL  RBC Count : 3.04 M/uL  Hemoglobin : 9.2 g/dL  Hematocrit : 27.2 %  Platelet Count - Automated : 191 K/uL  Mean Cell Volume : 89.5 fl  Mean Cell Hemoglobin : 30.3 pg  Mean Cell Hemoglobin Concentration : 33.8 gm/dL  Auto Neutrophil # : 7.15 K/uL  Auto Lymphocyte # : 0.65 K/uL  Auto Monocyte # : 0.57 K/uL  Auto Eosinophil # : 0.00 K/uL  Auto Basophil # : 0.00 K/uL  Auto Neutrophil % : 85.0 %  Auto Lymphocyte % : 7.7 %  Auto Monocyte % : 6.8 %  Auto Eosinophil % : 0.0 %  Auto Basophil % : 0.0 %    10-22    137  |  101  |  12.2  ----------------------------<  156<H>  3.8   |  21.0<L>  |  0.53    Ca    7.5<L>      22 Oct 2021 02:12  Phos  1.3     10-22  Mg     2.4     10-22    TPro  x   /  Alb  3.1<L>  /  TBili  x   /  DBili  x   /  AST  x   /  ALT  x   /  AlkPhos  x   10-22        ALLERGIES  No Known Allergies      MEDICATIONS   acetaminophen     Tablet .. 975 milliGRAM(s) Oral every 8 hours PRN  artificial  tears Solution 1 Drop(s) Both EYES daily  cephalexin 500 milliGRAM(s) Oral every 12 hours  chlorhexidine 0.12% Liquid 15 milliLiter(s) Oral Mucosa <User Schedule>  dexAMETHasone  Injectable 5 milliGRAM(s) IV Push every 8 hours  enalaprilat Injectable 1.25 milliGRAM(s) IV Push every 6 hours  enoxaparin Injectable 40 milliGRAM(s) SubCutaneous daily  fosphenytoin IVPB 150 milliGRAM(s) PE IV Intermittent every 12 hours  influenza   Vaccine 0.5 milliLiter(s) IntraMuscular once  insulin lispro (ADMELOG) corrective regimen sliding scale   SubCutaneous three times a day before meals  oxyCODONE    Solution 5 milliGRAM(s) Oral every 6 hours PRN  oxyCODONE    Solution 10 milliGRAM(s) Oral every 6 hours PRN  pantoprazole  Injectable 40 milliGRAM(s) IV Push daily  QUEtiapine 50 milliGRAM(s) Oral at bedtime      ----------------------------------------------------------------------------------------  PHYSICAL EXAM  Constitutional - NAD, Uncomfortable  HEENT - Multiple facial injuries. Eyelid swelling. Jaw swelling.  Neck - Tracheostomy with speaking valve  Chest - Breathing comfortably, No wheezing  Cardiovascular - S1S2   Abdomen - Soft, +PEG   Extremities - No C/C/E, No calf tenderness   Neurologic Exam -                    Cognitive - Able to communicate his pain. Able to self-suction secretions     Communication - Able to communicate with difficulty due to jaw surgery     Cranial Nerves - CN 2-12 grossly intact with limitations on testing due to facial swelling     Motor - Generally pain limited. Able to move all extremities against gravity but more limited in hip flexion due to pain.  Psychiatric - Anxious due to pain  ----------------------------------------------------------------------------------------  ASSESSMENT/PLAN  61yMale with functional deficits after falling off a ladder and suffering multiple trauma  TBI - maximize sleep/wake cycle for recovery. Maximize nutrition. Continue to monitor. Recommend discontinuing fosphenytoin and using Depakote instead.  Restlessness - This is related to pain. He is on Seroquel HS. Can add Depakote to maximize. Recommend scheduling the Tylenol and potentially even scheduling the oxycodone to maximize comfort and pain relief.  Bilateral Radial Fractures - s/p ORIF. NWB  Right Acetabular Fracture - TTWB  COVID - monitor  Sleep - Recommend melatonin 5 mg HS.  Dysphagia - PEG/TF  Pain - Tylenol, Oxycodone  DVT PPX - SCDs, Lovenox  Rehab -   Recommend DANIEL, patient DOES NOT meet acute inpatient rehabilitation criteria. Patient needs a more prolonged stay to achieve transition to community.     Will continue to follow. Functional progress will determine ongoing rehab dispo recommendations, which may change.    Continue bedside therapy as well as OOB throughout the day with mobilization by staff to maintain cardiopulmonary function and prevention of secondary complications related to debility.     Discussed with rehab team.

## 2021-10-22 NOTE — DISCHARGE NOTE PROVIDER - CARE PROVIDERS DIRECT ADDRESSES
,[email or web address],DirectAddress_Unknown [email or web address],DirectAddress_Unknown,DirectAddress_Unknown

## 2021-10-22 NOTE — PROGRESS NOTE ADULT - SUBJECTIVE AND OBJECTIVE BOX
24 HOUR EVENTS: Patient delirious, requiring frequent orientation.     SUBJECTIVE/ROS:  [ ] A ten-point review of systems was otherwise negative except as noted.  [ ] Due to altered mental status/intubation, subjective information were not able to be obtained from the patient. History was obtained, to the extent possible, from review of the chart and collateral sources of information.      NEURO  RASS:     GCS:     CAM ICU:  Exam:   Meds: acetaminophen     Tablet .. 975 milliGRAM(s) Oral every 8 hours PRN Mild Pain (1 - 3)  fosphenytoin IVPB 150 milliGRAM(s) PE IV Intermittent every 12 hours  oxyCODONE    Solution 5 milliGRAM(s) Oral every 6 hours PRN Moderate Pain (4 - 6)  oxyCODONE    Solution 10 milliGRAM(s) Oral every 6 hours PRN Severe Pain (7 - 10)  QUEtiapine 50 milliGRAM(s) Oral at bedtime    [x] Adequacy of sedation and pain control has been assessed and adjusted      RESPIRATORY  RR: 12 (10-22-21 @ 02:00) (10 - 22)  SpO2: 96% (10-22-21 @ 02:00) (92% - 99%)  Wt(kg): --  Exam: unlabored, clear to auscultation bilaterally  Mechanical Ventilation: Mode: standby    [ ] Extubation Readiness Assessed  Meds:       CARDIOVASCULAR  HR: 76 (10-22-21 @ 02:00) (76 - 107)  BP: 134/71 (10-22-21 @ 02:00) (134/71 - 189/85)  BP(mean): 89 (10-22-21 @ 02:00) (89 - 113)  ABP: --  ABP(mean): --  Wt(kg): --  CVP(cm H2O): --      Exam:  Cardiac Rhythm:  Perfusion     [ ]Adequate   [ ]Inadequate  Mentation   [ ]Normal       [ ]Reduced  Extremities  [ ]Warm         [ ]Cool  Volume Status [ ]Hypervolemic [ ]Euvolemic [ ]Hypovolemic  Meds: enalaprilat Injectable 1.25 milliGRAM(s) IV Push every 6 hours        GI/NUTRITION  Exam:  Diet:  Meds: pantoprazole  Injectable 40 milliGRAM(s) IV Push daily      GENITOURINARY  I&O's Detail    10-20 @ 07:01  -  10-21 @ 07:00  --------------------------------------------------------  IN:    Propofol: 124.7 mL    sodium chloride 0.9%: 1500 mL  Total IN: 1624.7 mL    OUT:    Indwelling Catheter - Urethral (mL): 1915 mL  Total OUT: 1915 mL    Total NET: -290.3 mL      10-21 @ 07:01  -  10-22 @ 05:52  --------------------------------------------------------  IN:  Total IN: 0 mL    OUT:    Indwelling Catheter - Urethral (mL): 3000 mL  Total OUT: 3000 mL    Total NET: -3000 mL          10-22    137  |  101  |  12.2  ----------------------------<  156<H>  3.8   |  21.0<L>  |  0.53    Ca    7.5<L>      22 Oct 2021 02:12  Phos  1.3     10-22  Mg     2.4     10-22    TPro  x   /  Alb  3.1<L>  /  TBili  x   /  DBili  x   /  AST  x   /  ALT  x   /  AlkPhos  x   10-22    [ ] Norris catheter, indication: N/A  Meds:       HEMATOLOGIC  Meds:   [x] VTE Prophylaxis                        9.2    8.41  )-----------( 191      ( 22 Oct 2021 02:12 )             27.2       Transfusion     [ ] PRBC   [ ] Platelets   [ ] FFP   [ ] Cryoprecipitate      INFECTIOUS DISEASES  T(C): 37.1 (10-21-21 @ 16:30), Max: 37.1 (10-21-21 @ 16:30)  Wt(kg): --  WBC Count: 8.41 K/uL (10-22 @ 02:12)    Recent Cultures:    Meds: influenza   Vaccine 0.5 milliLiter(s) IntraMuscular once        ENDOCRINE  Capillary Blood Glucose    Meds: dexAMETHasone  Injectable 5 milliGRAM(s) IV Push every 8 hours  insulin lispro (ADMELOG) corrective regimen sliding scale   SubCutaneous three times a day before meals        ACCESS DEVICES:  [ ] Peripheral IV  [ ] Central Venous Line	[ ] R	[ ] L	[ ] IJ	[ ] Fem	[ ] SC	Placed:   [ ] Arterial Line		[ ] R	[ ] L	[ ] Fem	[ ] Rad	[ ] Ax	Placed:   [ ] PICC:					[ ] Mediport  [ ] Urinary Catheter, Date Placed:   [ ] Necessity of urinary, arterial, and venous catheters discussed    OTHER MEDICATIONS:  artificial  tears Solution 1 Drop(s) Both EYES daily      CODE STATUS:     IMAGING:      24 HOUR EVENTS: Patient delirious, requiring frequent orientation and Haldol x1 overnight. Removal of nasal packing due to irritation and "claustrophobia" associated.  Patient improved. He is also now tolerating passimir valve. Sating well. Able to talk with staff and make needs known. Started on tube feeds. UOP adequate.     SUBJECTIVE/ROS:  [x ] A ten-point review of systems was otherwise negative except as noted.  [ ] Due to altered mental status/intubation, subjective information were not able to be obtained from the patient. History was obtained, to the extent possible, from review of the chart and collateral sources of information.      NEURO  RASS: 0    GCS:  15   CAM ICU: positive   Exam: No focal neuro deficits, DOMINGUEZ, 5/5 strength b/l   Meds: acetaminophen     Tablet .. 975 milliGRAM(s) Oral every 8 hours PRN Mild Pain (1 - 3)  fosphenytoin IVPB 150 milliGRAM(s) PE IV Intermittent every 12 hours  oxyCODONE    Solution 5 milliGRAM(s) Oral every 6 hours PRN Moderate Pain (4 - 6)  oxyCODONE    Solution 10 milliGRAM(s) Oral every 6 hours PRN Severe Pain (7 - 10)  QUEtiapine 50 milliGRAM(s) Oral at bedtime    [x] Adequacy of sedation and pain control has been assessed and adjusted      RESPIRATORY  RR: 12 (10-22-21 @ 02:00) (10 - 22)  SpO2: 96% (10-22-21 @ 02:00) (92% - 99%)  Wt(kg): --  Exam: unlabored, clear to auscultation bilaterally, trach in place without drainage   Mechanical Ventilation: Mode: standby    [ ] Extubation Readiness Assessed  Meds:       CARDIOVASCULAR  HR: 76 (10-22-21 @ 02:00) (76 - 107)  BP: 134/71 (10-22-21 @ 02:00) (134/71 - 189/85)  BP(mean): 89 (10-22-21 @ 02:00) (89 - 113)  ABP: --  ABP(mean): --  Wt(kg): --  CVP(cm H2O): --      Exam: nsr  Cardiac Rhythm: nsr  Perfusion     [x ]Adequate   [ ]Inadequate  Mentation   [x ]Normal       [ ]Reduced  Extremities  [x ]Warm         [ ]Cool  Volume Status [ ]Hypervolemic [x ]Euvolemic [ ]Hypovolemic  Meds: enalaprilat Injectable 1.25 milliGRAM(s) IV Push every 6 hours        GI/NUTRITION  Exam: soft, nttp, nd, peg tube secured in place   Diet: TF via peg   Meds: pantoprazole  Injectable 40 milliGRAM(s) IV Push daily      GENITOURINARY  I&O's Detail    10-20 @ 07:01  -  10-21 @ 07:00  --------------------------------------------------------  IN:    Propofol: 124.7 mL    sodium chloride 0.9%: 1500 mL  Total IN: 1624.7 mL    OUT:    Indwelling Catheter - Urethral (mL): 1915 mL  Total OUT: 1915 mL    Total NET: -290.3 mL      10-21 @ 07:01  -  10-22 @ 05:52  --------------------------------------------------------  IN:  Total IN: 0 mL    OUT:    Indwelling Catheter - Urethral (mL): 3000 mL  Total OUT: 3000 mL    Total NET: -3000 mL          10-22    137  |  101  |  12.2  ----------------------------<  156<H>  3.8   |  21.0<L>  |  0.53    Ca    7.5<L>      22 Oct 2021 02:12  Phos  1.3     10-22  Mg     2.4     10-22    TPro  x   /  Alb  3.1<L>  /  TBili  x   /  DBili  x   /  AST  x   /  ALT  x   /  AlkPhos  x   10-22    [x ] Norris catheter, indication: urinary retention   Meds:     Ext: bilateral upper extremities remains in splint    Skin: see above   HEMATOLOGIC  Meds:   [x] VTE Prophylaxis                        9.2    8.41  )-----------( 191      ( 22 Oct 2021 02:12 )             27.2       Transfusion     [ ] PRBC   [ ] Platelets   [ ] FFP   [ ] Cryoprecipitate      INFECTIOUS DISEASES  T(C): 37.1 (10-21-21 @ 16:30), Max: 37.1 (10-21-21 @ 16:30)  Wt(kg): --  WBC Count: 8.41 K/uL (10-22 @ 02:12)    Recent Cultures:    Meds: influenza   Vaccine 0.5 milliLiter(s) IntraMuscular once        ENDOCRINE  Capillary Blood Glucose    Meds: dexAMETHasone  Injectable 5 milliGRAM(s) IV Push every 8 hours  insulin lispro (ADMELOG) corrective regimen sliding scale   SubCutaneous three times a day before meals        ACCESS DEVICES:  [ ] Peripheral IV  [ ] Central Venous Line	[ ] R	[ ] L	[ ] IJ	[ ] Fem	[ ] SC	Placed:   [ ] Arterial Line		[ ] R	[ ] L	[ ] Fem	[ ] Rad	[ ] Ax	Placed:   [ ] PICC:					[ ] Mediport  [ ] Urinary Catheter, Date Placed:   [ ] Necessity of urinary, arterial, and venous catheters discussed    OTHER MEDICATIONS:  artificial  tears Solution 1 Drop(s) Both EYES daily      CODE STATUS:     IMAGING:

## 2021-10-22 NOTE — PROGRESS NOTE ADULT - SUBJECTIVE AND OBJECTIVE BOX
Pt is asleep but arousable to verbal stimuli.  He is s/p ORIF of frontal sinus fx/lefort 2 fx w/ arch bar placement w/ wires and nasal splint/packing.  He offers no acute complaints at this time. Nasal packing was removed last night as pt was claustrophobic. Abx and peridex were discontinue yesterday.     ICU Vital Signs Last 24 Hrs  T(C): 36.8 (22 Oct 2021 08:00), Max: 37.1 (21 Oct 2021 16:30)  T(F): 98.3 (22 Oct 2021 08:00), Max: 98.7 (21 Oct 2021 16:30)  HR: 91 (22 Oct 2021 12:00) (68 - 107)  BP: 133/58 (22 Oct 2021 12:00) (133/58 - 189/85)  BP(mean): 69 (22 Oct 2021 12:00) (69 - 113)  ABP: --  ABP(mean): --  RR: 18 (22 Oct 2021 12:00) (10 - 22)  SpO2: 95% (22 Oct 2021 12:00) (92% - 100%)                          9.2    8.41  )-----------( 191      ( 22 Oct 2021 02:12 )             27.2     10-22    137  |  101  |  12.2  ----------------------------<  156<H>  3.8   |  21.0<L>  |  0.53    Ca    7.5<L>      22 Oct 2021 02:12  Phos  1.3     10-22  Mg     2.4     10-22    TPro  x   /  Alb  3.1<L>  /  TBili  x   /  DBili  x   /  AST  x   /  ALT  x   /  AlkPhos  x   10-22          Physical exam  General : in no acute distress  HEENT : + trached, nasal splitn intact, right sided nasal packing in place, no active epistaxis at this time, wires intact, occlusion stable, facial sensation intact, incision of forehead is C/D/I w/ no overt signs of infection at this time  Chest:: equal chest rise

## 2021-10-22 NOTE — PROGRESS NOTE ADULT - ASSESSMENT
61yM, BIBA as trauma B s/p fall from ladder (approx 25 feet), found to have multiple traumatic injuries including scattered SAH, L SDH, multiple facial fractures, including maxillary sinus and nasal bone fractures, Lefort II fracture, B/L distal radial fractures, possible L ICA dissection on CTA, and R posterior acetabular fracture.    Neuro: TBI- continue neuro checks. Delirium started on seroquel however, decreased deliriogenic medications and narcotics. Continue home fosphenytoin     CV: Continue hemodynamic monitoring.     Pulm: s/p trachesotomy, tolerating passimir. Team will consider decannulating within the next few days     GI/Nutrition: s/p peg tube for dysphagia, continue tube feeds and put to goal     /Renal: wilkinson, flomax, TOV 10/23 (48hrs of flomax)    ID: no issues, monitor for s/sx of infection    Endo: dexamethasone 5 mg IV q 8, FS q6, ISS if needed    Skin: Repositioning for DTI prevention while in bed    Heme/DVT Prophylaxis: SCDs, lovenox    MSK: NWB to bilateral upper and Rlower ext    Lines/Tubes: [id number]    Dispo: Downgrade level of care?

## 2021-10-22 NOTE — CONSULT NOTE ADULT - CONSULT REQUESTED DATE/TIME
17-Oct-2021 16:07
17-Oct-2021 15:56
17-Oct-2021 19:56
18-Oct-2021 09:19
22-Oct-2021 12:55
17-Oct-2021 18:06
17-Oct-2021 11:54

## 2021-10-22 NOTE — DISCHARGE NOTE PROVIDER - NSDCACTIVITY_GEN_ALL_CORE
Discharge Summary


Discharge Summary


_


DATE OF ADMISSION: 9/13/2019





DATE OF DISCHARGE: 9/18/2019








DISCHARGED BY: Dr. Stephens





REASON FOR ADMISSION: 


75 years old female with past medical history of rheumatoid arthritis, 

pulmonary MAC, multidrug-resistant pneumonia, osteoporosis, recent 

hospitalization for acute kidney injury, was brought back by her family for 

evaluation.


Patient apparently did not feel well, had headache, right-sided flank pain and 

right lower quadrant abdominal pain.


She admitted to nausea, but no vomiting, no diarrhea.


Patient reported intermittent dry cough, but no wheezing, no hemoptysis.


No fever or chills.


No shortness of breath or chest pain.


No dizziness or blackout.


Upon evaluation vital signs are stable..


Laboratory work-up revealed no leukocytosis, hemoglobin 10.2, hematocrit 30.5, 

platelet count 369.


Urinalysis revealed +2 protein, +1 leukocyte esterase, no pyuria, no bacteria.


Stable electrolytes.


BUN 55, creatinine 2.2.


Stable LFT.


Troponin negative, pro .


Albumin 2.9.


CT scan of the abdomen reveal evidence of fluid and gas filled small bowel loops

, nonspecific, possibly representing enteritis or ileus.  


Bronchial wall thickening, concerning for bronchitis.  Patchy ground-glass 

opacity and density in the lower lobe, concerning for infectious inflammatory 

process.  


Patient subsequently admitted for further management.





 


CONSULTANTS:


hospitalist /pulmonologist Dr. Mustafa


GI specialist Dr. Shepard


nephrologist Dr.De Adrian


hematologist/oncologist Dr. Giordano


 


 


[facility] COURSE: 


Patient admitted to medical surgical floor.  


Patient started on the IV fluids.  


Renal parameters and electrolytes were closely monitored.  Electrolytes 

corrected as needed.  Nephrotoxics avoided.


Creatinine  was slowly trending down.  


Nephrologist followed.  


Per nephrologist, patient had acute kidney injury   mainly prerenal due to 

dehydration along with  chronic kidney disease.


Prior to discharge BUN from 58 down to 31 and creatinine from 2.2 down to 1.5.  


Recommended to avoid nephrotoxic medication in the future, especially 

nonsteroidal anti-inflammatory drugs.





Venous duplex bilateral lower extremity revealed no evidence of acute DVT.


Supplemental oxygen was on board as needed to keep pulse oximetry above 92%.


Bronchodilator treatment provided as needed.  Chest physical therapy provided.


Patient started on empiric antibiotics.


Antitussive administered as needed.


Given no fever, no leukocytosis, no shortness of breath, doubted pneumonia .


Patient had purulent bronchitis superimposed on chronic lung disease/emphysema.

  


Prior to discharge pulse oximetry was stable on room air.





GI specialist followed.  


GI specialist recommended outpatient colonoscopy as a screening, since patient 

never had one.  


Diet was slowly advanced as tolerated.  


GI prophylaxis provided.  


Bowel regimen instituted.  


Antiemetic were on board as needed.


Dietary recommendation regarding protein supplements implemented in plan of 

care.


Patient likely had enteritis , given CT results , which resulted with 

supportive care.


Stool for occult blood was negative. 


CEA  was within normal limits.  


Hemoglobin and hematocrit were closely monitored with goal to keep hemoglobin 

above 7.  


Prior to discharge hemoglobin 8.6, hematocrit 26.3.  


Anemia work-up was consistent with anemia of chronic disease


Hematologist followed.  Anemia of chronic disease was due to underlying chronic 

medical issues and was multifactorial.  


No evidence of hemolysis  noted.  Peripheral smear had been reviewed.  


Epogen or iron at this situation were not particularly indicated.


Pain management was addressed.


Supportive care provided.  


Patient clinically stabilized and was ready for discharge home with home health 

services to follow.








FINAL DIAGNOSES: 


Purulent bronchitis


Acute on chronic renal failure


Acute kidney injury/prerenal due to dehydration 


Enteritis


Emphysema of lung


Severe anemia 


Anemia of chronic disease 


Protein calorie malnutrition


Rheumatoid arthritis





DISCHARGE MEDICATIONS:


See Medication Reconciliation list.





DISCHARGE INSTRUCTIONS:


Patient was discharged home with home health services.  


Follow up with primary care provider in one week.





I have been assigned to dictate discharge summary for this account. 


I was not involved in the patient's management.











Minal Barnes NP Sep 20, 2019 08:54 Showering allowed

## 2021-10-22 NOTE — DISCHARGE NOTE PROVIDER - NSDCCPTREATMENT_GEN_ALL_CORE_FT
PRINCIPAL PROCEDURE  Procedure: Open reduction, complex [place] II fracture, by multiple approaches  Findings and Treatment:       SECONDARY PROCEDURE  Procedure: Insertion, PEG tube  Findings and Treatment:     Procedure: Percutaneous needle tracheostomy  Findings and Treatment:

## 2021-10-22 NOTE — PROGRESS NOTE ADULT - SUBJECTIVE AND OBJECTIVE BOX
JOAN ESTRELLA    [id number]    History:  The patient is status post ORIF bilateral wrist, 10/21. Patient is doing well this am.  Reported confusion/aggitation overnight, which has since improved. The patient's pain is controlled using the prescribed pain medications. No current reports of nausea, vomiting, chest pain, shortness of breath, abdominal pain or fever. No new complaints. No acute motor or sensory changes are reported.    Vital Signs Last 24 Hrs  T(C): 36.8 (22 Oct 2021 08:00), Max: 37.1 (21 Oct 2021 16:30)  T(F): 98.3 (22 Oct 2021 08:00), Max: 98.7 (21 Oct 2021 16:30)  HR: 92 (22 Oct 2021 09:00) (68 - 107)  BP: 153/75 (22 Oct 2021 09:00) (134/71 - 189/85)  BP(mean): 100 (22 Oct 2021 09:00) (88 - 113)  RR: 19 (22 Oct 2021 09:00) (10 - 22)  SpO2: 96% (22 Oct 2021 09:00) (92% - 100%)  I&O's Summary    21 Oct 2021 07:01  -  22 Oct 2021 07:00  --------------------------------------------------------  IN: 0 mL / OUT: 3000 mL / NET: -3000 mL    22 Oct 2021 07:01  -  22 Oct 2021 09:33  --------------------------------------------------------  IN: 0 mL / OUT: 500 mL / NET: -500 mL                              9.2    8.41  )-----------( 191      ( 22 Oct 2021 02:12 )             27.2     10-22    137  |  101  |  12.2  ----------------------------<  156<H>  3.8   |  21.0<L>  |  0.53    Ca    7.5<L>      22 Oct 2021 02:12  Phos  1.3     10-22  Mg     2.4     10-22    TPro  x   /  Alb  3.1<L>  /  TBili  x   /  DBili  x   /  AST  x   /  ALT  x   /  AlkPhos  x   10-22      MEDICATIONS  (STANDING):  artificial  tears Solution 1 Drop(s) Both EYES daily  dexAMETHasone  Injectable 5 milliGRAM(s) IV Push every 8 hours  enalaprilat Injectable 1.25 milliGRAM(s) IV Push every 6 hours  enoxaparin Injectable 40 milliGRAM(s) SubCutaneous daily  fosphenytoin IVPB 150 milliGRAM(s) PE IV Intermittent every 12 hours  influenza   Vaccine 0.5 milliLiter(s) IntraMuscular once  insulin lispro (ADMELOG) corrective regimen sliding scale   SubCutaneous three times a day before meals  pantoprazole  Injectable 40 milliGRAM(s) IV Push daily  QUEtiapine 50 milliGRAM(s) Oral at bedtime    MEDICATIONS  (PRN):  acetaminophen     Tablet .. 975 milliGRAM(s) Oral every 8 hours PRN Mild Pain (1 - 3)  oxyCODONE    Solution 5 milliGRAM(s) Oral every 6 hours PRN Moderate Pain (4 - 6)  oxyCODONE    Solution 10 milliGRAM(s) Oral every 6 hours PRN Severe Pain (7 - 10)      Physical exam: Bilateral upper extremities, Well padded splints are in good position. The dressing is clean, dry and intact. Sensation to light touch is grossly intact without focal deficit and is symmetric bilaterally. Proximal forearm compartments are compressible. Sensation to light touch is grossly intact distally to fingers. Motor function intact distally, moving all fingers. Unable to access for wrist drop due to splints. . Capillary refill is less than 2 seconds. No cyanosis.    Primary Orthopedic Assessment:  POD1. bilateral wrist ORIF  Orthopedic stable  patient also with Right posterior acetabular fracture   •   Plan:   • DVT prophylaxis as prescribed, including use of compression devices and ankle pumps  •bed rest for now  - non weight bearing  upper/lower     JOAN ESTRELLA    [id number]    History:  The patient is status post ORIF bilateral wrist, 10/21. Patient is doing well this am.  Reported confusion/aggitation overnight, which has since improved. The patient's pain is controlled using the prescribed pain medications. No current reports of nausea, vomiting, chest pain, shortness of breath, abdominal pain or fever. No new complaints. No acute motor or sensory changes are reported.    Vital Signs Last 24 Hrs  T(C): 36.8 (22 Oct 2021 08:00), Max: 37.1 (21 Oct 2021 16:30)  T(F): 98.3 (22 Oct 2021 08:00), Max: 98.7 (21 Oct 2021 16:30)  HR: 92 (22 Oct 2021 09:00) (68 - 107)  BP: 153/75 (22 Oct 2021 09:00) (134/71 - 189/85)  BP(mean): 100 (22 Oct 2021 09:00) (88 - 113)  RR: 19 (22 Oct 2021 09:00) (10 - 22)  SpO2: 96% (22 Oct 2021 09:00) (92% - 100%)  I&O's Summary    21 Oct 2021 07:01  -  22 Oct 2021 07:00  --------------------------------------------------------  IN: 0 mL / OUT: 3000 mL / NET: -3000 mL    22 Oct 2021 07:01  -  22 Oct 2021 09:33  --------------------------------------------------------  IN: 0 mL / OUT: 500 mL / NET: -500 mL                              9.2    8.41  )-----------( 191      ( 22 Oct 2021 02:12 )             27.2     10-22    137  |  101  |  12.2  ----------------------------<  156<H>  3.8   |  21.0<L>  |  0.53    Ca    7.5<L>      22 Oct 2021 02:12  Phos  1.3     10-22  Mg     2.4     10-22    TPro  x   /  Alb  3.1<L>  /  TBili  x   /  DBili  x   /  AST  x   /  ALT  x   /  AlkPhos  x   10-22      MEDICATIONS  (STANDING):  artificial  tears Solution 1 Drop(s) Both EYES daily  dexAMETHasone  Injectable 5 milliGRAM(s) IV Push every 8 hours  enalaprilat Injectable 1.25 milliGRAM(s) IV Push every 6 hours  enoxaparin Injectable 40 milliGRAM(s) SubCutaneous daily  fosphenytoin IVPB 150 milliGRAM(s) PE IV Intermittent every 12 hours  influenza   Vaccine 0.5 milliLiter(s) IntraMuscular once  insulin lispro (ADMELOG) corrective regimen sliding scale   SubCutaneous three times a day before meals  pantoprazole  Injectable 40 milliGRAM(s) IV Push daily  QUEtiapine 50 milliGRAM(s) Oral at bedtime    MEDICATIONS  (PRN):  acetaminophen     Tablet .. 975 milliGRAM(s) Oral every 8 hours PRN Mild Pain (1 - 3)  oxyCODONE    Solution 5 milliGRAM(s) Oral every 6 hours PRN Moderate Pain (4 - 6)  oxyCODONE    Solution 10 milliGRAM(s) Oral every 6 hours PRN Severe Pain (7 - 10)      Physical exam: Bilateral upper extremities, Well padded splints are in good position. The dressing is clean, dry and intact. Sensation to light touch is grossly intact without focal deficit and is symmetric bilaterally. Proximal forearm compartments are compressible. Sensation to light touch is grossly intact distally to fingers. Motor function intact distally, moving all fingers. Unable to access for wrist drop due to splints. . Capillary refill is less than 2 seconds. No cyanosis.    Primary Orthopedic Assessment:  POD1. bilateral wrist ORIF  patient also with Right posterior acetabular fracture   generally orthopedic stable at this time      •   Plan:   • DVT prophylaxis as prescribed, including use of compression devices and ankle pumps  •bed rest for now  - non weight bearing  upper/lower       JOAN ESTRELLA    [id number]    History:  The patient is status post ORIF bilateral wrist, 10/21. Patient is doing well this am.  Reported confusion/aggitation overnight, which has since improved. The patient's pain is controlled using the prescribed pain medications. No current reports of nausea, vomiting, chest pain, shortness of breath, abdominal pain or fever. No new complaints. No acute motor or sensory changes are reported.    Vital Signs Last 24 Hrs  T(C): 36.8 (22 Oct 2021 08:00), Max: 37.1 (21 Oct 2021 16:30)  T(F): 98.3 (22 Oct 2021 08:00), Max: 98.7 (21 Oct 2021 16:30)  HR: 92 (22 Oct 2021 09:00) (68 - 107)  BP: 153/75 (22 Oct 2021 09:00) (134/71 - 189/85)  BP(mean): 100 (22 Oct 2021 09:00) (88 - 113)  RR: 19 (22 Oct 2021 09:00) (10 - 22)  SpO2: 96% (22 Oct 2021 09:00) (92% - 100%)  I&O's Summary    21 Oct 2021 07:01  -  22 Oct 2021 07:00  --------------------------------------------------------  IN: 0 mL / OUT: 3000 mL / NET: -3000 mL    22 Oct 2021 07:01  -  22 Oct 2021 09:33  --------------------------------------------------------  IN: 0 mL / OUT: 500 mL / NET: -500 mL                              9.2    8.41  )-----------( 191      ( 22 Oct 2021 02:12 )             27.2     10-22    137  |  101  |  12.2  ----------------------------<  156<H>  3.8   |  21.0<L>  |  0.53    Ca    7.5<L>      22 Oct 2021 02:12  Phos  1.3     10-22  Mg     2.4     10-22    TPro  x   /  Alb  3.1<L>  /  TBili  x   /  DBili  x   /  AST  x   /  ALT  x   /  AlkPhos  x   10-22      MEDICATIONS  (STANDING):  artificial  tears Solution 1 Drop(s) Both EYES daily  dexAMETHasone  Injectable 5 milliGRAM(s) IV Push every 8 hours  enalaprilat Injectable 1.25 milliGRAM(s) IV Push every 6 hours  enoxaparin Injectable 40 milliGRAM(s) SubCutaneous daily  fosphenytoin IVPB 150 milliGRAM(s) PE IV Intermittent every 12 hours  influenza   Vaccine 0.5 milliLiter(s) IntraMuscular once  insulin lispro (ADMELOG) corrective regimen sliding scale   SubCutaneous three times a day before meals  pantoprazole  Injectable 40 milliGRAM(s) IV Push daily  QUEtiapine 50 milliGRAM(s) Oral at bedtime    MEDICATIONS  (PRN):  acetaminophen     Tablet .. 975 milliGRAM(s) Oral every 8 hours PRN Mild Pain (1 - 3)  oxyCODONE    Solution 5 milliGRAM(s) Oral every 6 hours PRN Moderate Pain (4 - 6)  oxyCODONE    Solution 10 milliGRAM(s) Oral every 6 hours PRN Severe Pain (7 - 10)      Physical exam: Bilateral upper extremities, Well padded splints are in good position. The dressing is clean, dry and intact. Sensation to light touch is grossly intact without focal deficit and is symmetric bilaterally. Proximal forearm compartments are compressible. Sensation to light touch is grossly intact distally to fingers. Motor function intact distally, moving all fingers. Unable to access for wrist drop due to splints. . Capillary refill is less than 2 seconds. No cyanosis.    Primary Orthopedic Assessment:  POD1. bilateral wrist ORIF  patient also with Right posterior acetabular fracture   generally orthopedic stable at this time        Plan:   • DVT prophylaxis as prescribed, including use of compression devices and ankle pumps  - pain control  - non weight bearing bilateral upper extremities, can weight bear through elbows  - TTWB RLE

## 2021-10-22 NOTE — PROGRESS NOTE ADULT - ASSESSMENT
62 y/o M w/ lefort 2/frontal sinus fx (arch bars and wires)    Plan  - restart Peridex QID  - redstart ABX, medrol dose juan luis  - do not wet face (nasal splint is on)  - Baci BID to facial laceration, and clean area with water and dial soap  - Keep head of bed elevated  - appreciate care of ICU

## 2021-10-22 NOTE — PROGRESS NOTE ADULT - ATTENDING COMMENTS
Appeared mildly confused on exam this AM; however, able to orient and follows multistep commands appropriately.   Endorses b/l UE (R>L) pain.  Moving all extremities to command.   Tolerating PM valve without respiratory issues.     Agree with plan above.     --downgrade from SICU level of care. Ok for SDU if available.

## 2021-10-22 NOTE — DISCHARGE NOTE PROVIDER - NSDCMRMEDTOKEN_GEN_ALL_CORE_FT
atorvastatin 10 mg oral tablet: 1 tab(s) orally once a day  Dilantin 100 mg oral capsule: 2 cap(s) orally once a day  ergocalciferol 1.25 mg (50,000 intl units) oral capsule: 1 cap(s) orally once a week  pantoprazole 40 mg oral delayed release tablet: 1 tab(s) orally once a day   albuterol 90 mcg/inh inhalation aerosol: 2 puff(s) inhaled every 6 hours, As needed, Wheezing  atorvastatin 10 mg oral tablet: 1 tab(s) orally once a day  chlorhexidine 0.12% mucous membrane liquid: 15 milliliter(s) mucous membrane   Dilantin 100 mg oral capsule: 2 cap(s) orally once a day  enalapril 5 mg oral tablet: 1 tab(s) orally once a day  enoxaparin: 40 milligram(s) subcutaneous once a day  ergocalciferol 1.25 mg (50,000 intl units) oral capsule: 1 cap(s) orally once a week  insulin lispro 100 units/mL injectable solution: 1 Unit(s) if Glucose 151 - 200  2 Unit(s) if Glucose 201 - 250  3 Unit(s) if Glucose 251 - 300  4 Unit(s) if Glucose 301 - 350  5 Unit(s) if Glucose 351 - 400  6 Unit(s) if Glucose Greater Than 400  melatonin 5 mg oral tablet: 1 tab(s) orally once a day (at bedtime)  ocular lubricant ophthalmic solution: 1 drop(s) to each affected eye once a day  oxyCODONE 5 mg/5 mL oral solution: 5 milliliter(s) orally every 4 hours, As needed, Moderate Pain (4 - 6)  oxyCODONE 5 mg/5 mL oral solution: 10 milliliter(s) orally every 4 hours, As needed, Severe Pain (7 - 10)  pantoprazole 40 mg oral delayed release tablet: 1 tab(s) orally once a day  QUEtiapine 50 mg oral tablet: 1 tab(s) orally once a day (at bedtime)  valproic acid 250 mg/5 mL oral liquid:  orally at 0800 daily  valproic acid 250 mg/5 mL oral liquid:  orally at 2000 daily   albuterol 90 mcg/inh inhalation aerosol: 2 puff(s) inhaled every 6 hours, As needed, Wheezing  atorvastatin 10 mg oral tablet: 1 tab(s) orally once a day  Barium Swallow Study: 1 unspecified orally once   chlorhexidine 0.12% mucous membrane liquid: 15 milliliter(s) mucous membrane   Dilantin 100 mg oral capsule: 2 cap(s) orally once a day  enalapril 5 mg oral tablet: 1 tab(s) orally once a day  enoxaparin: 40 milligram(s) subcutaneous once a day  ergocalciferol 1.25 mg (50,000 intl units) oral capsule: 1 cap(s) orally once a week  insulin lispro 100 units/mL injectable solution: 1 Unit(s) if Glucose 151 - 200  2 Unit(s) if Glucose 201 - 250  3 Unit(s) if Glucose 251 - 300  4 Unit(s) if Glucose 301 - 350  5 Unit(s) if Glucose 351 - 400  6 Unit(s) if Glucose Greater Than 400  melatonin 5 mg oral tablet: 1 tab(s) orally once a day (at bedtime)  ocular lubricant ophthalmic solution: 1 drop(s) to each affected eye once a day  oxyCODONE 5 mg/5 mL oral solution: 5 milliliter(s) orally every 4 hours, As needed, Moderate Pain (4 - 6)  oxyCODONE 5 mg/5 mL oral solution: 10 milliliter(s) orally every 4 hours, As needed, Severe Pain (7 - 10)  pantoprazole 40 mg oral delayed release tablet: 1 tab(s) orally once a day  QUEtiapine 50 mg oral tablet: 1 tab(s) orally once a day (at bedtime)  valproic acid 250 mg/5 mL oral liquid:  orally at 0800 daily  valproic acid 250 mg/5 mL oral liquid:  orally at 2000 daily

## 2021-10-22 NOTE — DISCHARGE NOTE PROVIDER - NSDCFUADDINST_GEN_ALL_CORE_FT
Ortho follow up: The patient will be seen in the office of Dr. Webb between 1-2 weeks for splint removal and wound check. Patient may NOT shower until after re-evaluation in the office. The patient will continue with splint as applied in hospital and not remove until re-evaluation in the office. The patient will contact the office if the wound becomes red, has increasing pain, develops bleeding or discharge, an injury occurs, or has other concerns. The patient will take oxycodone for pain control and titrate according to prescription and patient needs. The patient is NON-weight bearing on the bilateral upper extremities but may weight bear through elbows. The patient is recommended to elevated the affected extremity to reduce swelling. If the splint/cast  becomes too tight, the patient is to immediately elevate and contact the office to discuss further management or immediately proceed to the office if unable to make contact with the office. Patient is to follow up with Dr. Lozoya in 2 weeks for acetabular fracture. Patient is toe touch weight bearing of the right lower extremity. Please call office of Dr. Lozoya to schedule appointment.

## 2021-10-22 NOTE — PROGRESS NOTE ADULT - ATTENDING COMMENTS
Orthopaedic Trauma Surgeon Addendum:    I have personally performed a face-to-face diagnostic evaluation on this patient.  I have reviewed the physician assistant note and agree with the history, exam, and plan of care, except as noted.    Repeat imaging shows no significant change in the patient's acetabulum fracture. Given his other injuries, questionable covid status, recent facial surgery and moderate pre-exisiting arthritic changes in his hip, I would recommend TTWB x 4-6 weeks and repeat x-rays.  Surgery will not change the time of limited WB and the benefits of ORIF on an already arthritic hip are significantly decreased.  Recommend follow up in 4-6 weeks.     Hadley Lozoya MD  Orthopaedic Trauma Surgeon  [facility] [place]

## 2021-10-22 NOTE — DISCHARGE NOTE PROVIDER - NSDCCPCAREPLAN_GEN_ALL_CORE_FT
PRINCIPAL DISCHARGE DIAGNOSIS  Diagnosis: Subarachnoid hemorrhage  Assessment and Plan of Treatment:       SECONDARY DISCHARGE DIAGNOSES  Diagnosis: Wrist fracture, bilateral  Assessment and Plan of Treatment: Ortho follow up: The patient will be seen in the office of Dr. Webb between 1-2 weeks for splint removal and wound check. Patient may NOT shower until after re-evaluation in the office. The patient will continue with splint as applied in hospital and not remove until re-evaluation in the office. The patient will contact the office if the wound becomes red, has increasing pain, develops bleeding or discharge, an injury occurs, or has other concerns. The patient will take oxycodone for pain control and titrate according to prescription and patient needs. The patient is NON-weight bearing on the bilateral upper extremities but may weight bear through elbows. The patient is recommended to elevated the affected extremity to reduce swelling. If the splint/cast  becomes too tight, the patient is to immediately elevate and contact the office to discuss further management or immediately proceed to the office if unable to make contact with the office.    Diagnosis: Acetabular fracture  Assessment and Plan of Treatment: Patient is to follow up with Dr. Lozoya in 2 weeks for acetabular fracture. Patient is toe touch weight bearing of the right lower extremity. Please call office of Dr. Lozoya to schedule appointment.

## 2021-10-22 NOTE — PROGRESS NOTE ADULT - ASSESSMENT
Assessment:   60 y/o male s/p fall from ladder with polytrauma       Plan:   - Discussed with attending   - continue to monitor for CSF leak   - Seizure prophy x7d (10/24)  - Repeat CTH if change in mental status or exam   - DVT prophylaxis: SCDs, lovenox 40  - Further care per primary team   - If any sign of CSF leak, including clear rhinorrhea, salty taste in the back of mouth, etc, please re-consult

## 2021-10-22 NOTE — PROGRESS NOTE ADULT - SUBJECTIVE AND OBJECTIVE BOX
Patient is a 61y old  Male who presents with a chief complaint of ORIF frontal sinus fx and LeForte II fx (20 Oct 2021 23:35)    HPI:  TRAUMA SURGERY H&P    Admitting surgical team: Trauma Surgery  Admitting attending: Dr. Alvarez  Patient seen and examined: 10/172/2021 1130  HPI:    Patient is a 61yM, unknown pmhx, BIBA as trauma B s/p fall from ladder in a tree approx 25 feet , hemodynamically stable en route, saturating 93% therefor placed on 2 L NC. Upon arrival to trauma bay, patient GCS 14, HD stable, c/o head and b/l arm pain. Primary survey intact, secondary survery pertinent findings include laceration to left forehead, b/l wrist deformities, b/l knee pain. Patient required mouth suctioning for blood causing him to cough/spit up.   Denies cp, sob, n/v/d, abd pain.     PRIMARY SURVEY:  A: Airway intact   B:  bilateral air entry,  CTAB, trachea in midline  C: central and peripheral pulses intact bilaterally   D: GCS 14    E: exposed in a private room in the ED in order to fully examine any injuries     (17 Oct 2021 11:41)      Interval history:  Patient seen and examined by neurosurgery team. Patient has no acute complaints. Patient underwent ORIF of b/l wrist yesterday with ortho.       Vital Signs Last 24 Hrs  T(C): 36.8 (22 Oct 2021 08:00), Max: 37.1 (21 Oct 2021 16:30)  T(F): 98.3 (22 Oct 2021 08:00), Max: 98.7 (21 Oct 2021 16:30)  HR: 92 (22 Oct 2021 09:00) (68 - 107)  BP: 153/75 (22 Oct 2021 09:00) (134/71 - 189/85)  BP(mean): 100 (22 Oct 2021 09:00) (88 - 113)  RR: 19 (22 Oct 2021 09:00) (10 - 22)  SpO2: 96% (22 Oct 2021 09:00) (92% - 100%)      Physical Exam:  GENERAL: NAD, well-groomed, well-developed  GCS 15   MENTAL STATUS: AAO x3; speech clear   no evidence of csf rhinorrhea   B/l UE casted; B/l LE antigravity   following commands briskly       LABS:                        9.2    8.41  )-----------( 191      ( 22 Oct 2021 02:12 )             27.2     10-22    137  |  101  |  12.2  ----------------------------<  156<H>  3.8   |  21.0<L>  |  0.53    Ca    7.5<L>      22 Oct 2021 02:12  Phos  1.3     10-22  Mg     2.4     10-22    TPro  x   /  Alb  3.1<L>  /  TBili  x   /  DBili  x   /  AST  x   /  ALT  x   /  AlkPhos  x   10-22      RADIOLOGY & ADDITIONAL STUDIES:  No new imaging to review     < from: MR Angio Head No Cont (10.19.21 @ 09:56) >    IMPRESSION:    Brain MRI:  1.  Bilateral frontal, parietal occipital, and parafalcine subdural hemorrhages. Subarachnoid hemorrhage along the bilateral cerebral convexities and in the basal cisterns. Bilateral frontal hemorrhagic contusions, right greater than left. Bilateral occipital hemorrhagic contusions. No significant midline shift, hydrocephalus, or effacement of basal cisterns.  2.  Multiple skull, skull base and facial fractures, as seen on prior CTs, raising the possibility of CSF leak.    Brain MRA: No large vessel occlusion or aneurysm.    Neck MRA: No hemodynamically significant stenosis of the bilateral cervical ICAs by NASCET criteria.    --- End of Report ---    APRIL BLACKWELL MD; Attending Radiologist  This document has been electronically signed. Oct 19 2021 10:54AM    < end of copied text >     Patient is a 61y old  Male who presents with a chief complaint of ORIF frontal sinus fx and LeForte II fx (20 Oct 2021 23:35)    HPI:  TRAUMA SURGERY H&P    Admitting surgical team: Trauma Surgery  Admitting attending: Dr. Alvarez  Patient seen and examined: 10/172/2021 1130  HPI:    Patient is a 61yM, unknown pmhx, BIBA as trauma B s/p fall from ladder in a tree approx 25 feet , hemodynamically stable en route, saturating 93% therefor placed on 2 L NC. Upon arrival to trauma bay, patient GCS 14, HD stable, c/o head and b/l arm pain. Primary survey intact, secondary survery pertinent findings include laceration to left forehead, b/l wrist deformities, b/l knee pain. Patient required mouth suctioning for blood causing him to cough/spit up.   Denies cp, sob, n/v/d, abd pain.     PRIMARY SURVEY:  A: Airway intact   B:  bilateral air entry,  CTAB, trachea in midline  C: central and peripheral pulses intact bilaterally   D: GCS 14    E: exposed in a private room in the ED in order to fully examine any injuries     (17 Oct 2021 11:41)      Interval history:  Patient seen and examined by neurosurgery team. Patient complaining of penile pain 2/2 condom catheter use. Some difficulty speaking 2/2 jaw wiring, but able to communicate some. Patient underwent ORIF of b/l wrist yesterday with ortho.       Vital Signs Last 24 Hrs  T(C): 36.8 (22 Oct 2021 08:00), Max: 37.1 (21 Oct 2021 16:30)  T(F): 98.3 (22 Oct 2021 08:00), Max: 98.7 (21 Oct 2021 16:30)  HR: 92 (22 Oct 2021 09:00) (68 - 107)  BP: 153/75 (22 Oct 2021 09:00) (134/71 - 189/85)  BP(mean): 100 (22 Oct 2021 09:00) (88 - 113)  RR: 19 (22 Oct 2021 09:00) (10 - 22)  SpO2: 96% (22 Oct 2021 09:00) (92% - 100%)      Physical Exam:  GENERAL: NAD, well-groomed, well-developed, somewhat somnolent   GCS 15   MENTAL STATUS: AAO x3; speech somewhat difficult to understand 2/2 jaw wiring   no evidence of csf rhinorrhea, patient denies salty taste in mouth  B/l UE casted but moves briskly; B/l LE antigravity   following commands briskly       LABS:                        9.2    8.41  )-----------( 191      ( 22 Oct 2021 02:12 )             27.2     10-22    137  |  101  |  12.2  ----------------------------<  156<H>  3.8   |  21.0<L>  |  0.53    Ca    7.5<L>      22 Oct 2021 02:12  Phos  1.3     10-22  Mg     2.4     10-22    TPro  x   /  Alb  3.1<L>  /  TBili  x   /  DBili  x   /  AST  x   /  ALT  x   /  AlkPhos  x   10-22      RADIOLOGY & ADDITIONAL STUDIES:  No new imaging to review     < from: MR Angio Head No Cont (10.19.21 @ 09:56) >    IMPRESSION:    Brain MRI:  1.  Bilateral frontal, parietal occipital, and parafalcine subdural hemorrhages. Subarachnoid hemorrhage along the bilateral cerebral convexities and in the basal cisterns. Bilateral frontal hemorrhagic contusions, right greater than left. Bilateral occipital hemorrhagic contusions. No significant midline shift, hydrocephalus, or effacement of basal cisterns.  2.  Multiple skull, skull base and facial fractures, as seen on prior CTs, raising the possibility of CSF leak.    Brain MRA: No large vessel occlusion or aneurysm.    Neck MRA: No hemodynamically significant stenosis of the bilateral cervical ICAs by NASCET criteria.    --- End of Report ---    APRIL BLACKWELL MD; Attending Radiologist  This document has been electronically signed. Oct 19 2021 10:54AM    < end of copied text >

## 2021-10-22 NOTE — DISCHARGE NOTE PROVIDER - CARE PROVIDER_API CALL
Hadley Lozoya)  Orthopaedic Surgery  [address] [place] [address]  Phone: [phone]  Fax: [phone]  Follow Up Time:     Ines Webb)  Orthopaedic Surgery; Surgery of the Hand  [address] [place] [address]  Phone: [phone]  Fax: [phone]  Follow Up Time:    Hadley Lozoya)  Orthopaedic Surgery  [address] [place] [address]  Phone: [phone]  Fax: [phone]  Follow Up Time:     Ines Webb)  Orthopaedic Surgery; Surgery of the Hand  [address] [place] [address]  Phone: [phone]  Fax: [phone]  Follow Up Time:     Esther Cisneros)  Plastic Surgery  [address] [place] [address]  Phone: [phone]  Fax: [phone]  Follow Up Time:

## 2021-10-23 LAB
ANION GAP SERPL CALC-SCNC: 15 MMOL/L — SIGNIFICANT CHANGE UP (ref 5–17)
BASOPHILS # BLD AUTO: 0.02 K/UL — SIGNIFICANT CHANGE UP (ref 0–0.2)
BASOPHILS NFR BLD AUTO: 0.2 % — SIGNIFICANT CHANGE UP (ref 0–2)
BUN SERPL-MCNC: 13.4 MG/DL — SIGNIFICANT CHANGE UP (ref 8–20)
CALCIUM SERPL-MCNC: 7.7 MG/DL — LOW (ref 8.6–10.2)
CHLORIDE SERPL-SCNC: 97 MMOL/L — LOW (ref 98–107)
CO2 SERPL-SCNC: 20 MMOL/L — LOW (ref 22–29)
CREAT SERPL-MCNC: 0.49 MG/DL — LOW (ref 0.5–1.3)
EOSINOPHIL # BLD AUTO: 0.1 K/UL — SIGNIFICANT CHANGE UP (ref 0–0.5)
EOSINOPHIL NFR BLD AUTO: 0.9 % — SIGNIFICANT CHANGE UP (ref 0–6)
GLUCOSE BLDC GLUCOMTR-MCNC: 146 MG/DL — HIGH (ref 70–99)
GLUCOSE BLDC GLUCOMTR-MCNC: 167 MG/DL — HIGH (ref 70–99)
GLUCOSE BLDC GLUCOMTR-MCNC: 184 MG/DL — HIGH (ref 70–99)
GLUCOSE SERPL-MCNC: 186 MG/DL — HIGH (ref 70–99)
HCT VFR BLD CALC: 30 % — LOW (ref 39–50)
HGB BLD-MCNC: 10.6 G/DL — LOW (ref 13–17)
IMM GRANULOCYTES NFR BLD AUTO: 1 % — SIGNIFICANT CHANGE UP (ref 0–1.5)
LYMPHOCYTES # BLD AUTO: 0.61 K/UL — LOW (ref 1–3.3)
LYMPHOCYTES # BLD AUTO: 5.4 % — LOW (ref 13–44)
MAGNESIUM SERPL-MCNC: 2 MG/DL — SIGNIFICANT CHANGE UP (ref 1.6–2.6)
MCHC RBC-ENTMCNC: 31.1 PG — SIGNIFICANT CHANGE UP (ref 27–34)
MCHC RBC-ENTMCNC: 35.3 GM/DL — SIGNIFICANT CHANGE UP (ref 32–36)
MCV RBC AUTO: 88 FL — SIGNIFICANT CHANGE UP (ref 80–100)
MONOCYTES # BLD AUTO: 0.83 K/UL — SIGNIFICANT CHANGE UP (ref 0–0.9)
MONOCYTES NFR BLD AUTO: 7.4 % — SIGNIFICANT CHANGE UP (ref 2–14)
NEUTROPHILS # BLD AUTO: 9.54 K/UL — HIGH (ref 1.8–7.4)
NEUTROPHILS NFR BLD AUTO: 85.1 % — HIGH (ref 43–77)
PHOSPHATE SERPL-MCNC: 1.4 MG/DL — LOW (ref 2.4–4.7)
PLATELET # BLD AUTO: 262 K/UL — SIGNIFICANT CHANGE UP (ref 150–400)
POTASSIUM SERPL-MCNC: 3.9 MMOL/L — SIGNIFICANT CHANGE UP (ref 3.5–5.3)
POTASSIUM SERPL-SCNC: 3.9 MMOL/L — SIGNIFICANT CHANGE UP (ref 3.5–5.3)
RBC # BLD: 3.41 M/UL — LOW (ref 4.2–5.8)
RBC # FLD: 12.1 % — SIGNIFICANT CHANGE UP (ref 10.3–14.5)
SODIUM SERPL-SCNC: 132 MMOL/L — LOW (ref 135–145)
WBC # BLD: 11.21 K/UL — HIGH (ref 3.8–10.5)
WBC # FLD AUTO: 11.21 K/UL — HIGH (ref 3.8–10.5)

## 2021-10-23 PROCEDURE — 99231 SBSQ HOSP IP/OBS SF/LOW 25: CPT | Mod: GC

## 2021-10-23 RX ORDER — ACETAMINOPHEN 500 MG
1000 TABLET ORAL ONCE
Refills: 0 | Status: COMPLETED | OUTPATIENT
Start: 2021-10-23 | End: 2021-10-23

## 2021-10-23 RX ADMIN — Medication 500 MILLIGRAM(S): at 06:41

## 2021-10-23 RX ADMIN — CHLORHEXIDINE GLUCONATE 15 MILLILITER(S): 213 SOLUTION TOPICAL at 17:12

## 2021-10-23 RX ADMIN — Medication 5 MILLIGRAM(S): at 06:41

## 2021-10-23 RX ADMIN — CHLORHEXIDINE GLUCONATE 15 MILLILITER(S): 213 SOLUTION TOPICAL at 12:29

## 2021-10-23 RX ADMIN — Medication 5 MILLIGRAM(S): at 15:20

## 2021-10-23 RX ADMIN — OXYCODONE HYDROCHLORIDE 10 MILLIGRAM(S): 5 TABLET ORAL at 13:00

## 2021-10-23 RX ADMIN — Medication 5 MILLIGRAM(S): at 22:54

## 2021-10-23 RX ADMIN — FOSPHENYTOIN 106 MILLIGRAM(S) PE: 50 INJECTION INTRAMUSCULAR; INTRAVENOUS at 17:12

## 2021-10-23 RX ADMIN — Medication 500 MILLIGRAM(S): at 17:12

## 2021-10-23 RX ADMIN — FOSPHENYTOIN 106 MILLIGRAM(S) PE: 50 INJECTION INTRAMUSCULAR; INTRAVENOUS at 06:45

## 2021-10-23 RX ADMIN — ENOXAPARIN SODIUM 40 MILLIGRAM(S): 100 INJECTION SUBCUTANEOUS at 12:29

## 2021-10-23 RX ADMIN — Medication 1000 MILLIGRAM(S): at 01:58

## 2021-10-23 RX ADMIN — Medication 1 DROP(S): at 12:29

## 2021-10-23 RX ADMIN — QUETIAPINE FUMARATE 50 MILLIGRAM(S): 200 TABLET, FILM COATED ORAL at 22:54

## 2021-10-23 RX ADMIN — Medication 5 MILLIGRAM(S): at 23:01

## 2021-10-23 RX ADMIN — Medication 1: at 16:53

## 2021-10-23 RX ADMIN — CHLORHEXIDINE GLUCONATE 15 MILLILITER(S): 213 SOLUTION TOPICAL at 23:01

## 2021-10-23 RX ADMIN — OXYCODONE HYDROCHLORIDE 10 MILLIGRAM(S): 5 TABLET ORAL at 12:28

## 2021-10-23 RX ADMIN — Medication 400 MILLIGRAM(S): at 01:16

## 2021-10-23 RX ADMIN — CHLORHEXIDINE GLUCONATE 15 MILLILITER(S): 213 SOLUTION TOPICAL at 01:11

## 2021-10-23 NOTE — PROGRESS NOTE ADULT - ASSESSMENT
61yM, BIBA as trauma B s/p fall from ladder (approx 25 feet), found to have multiple traumatic injuries including scattered SAH, L SDH, multiple facial fractures, including maxillary sinus and nasal bone fractures, Lefort II fracture, B/L distal radial fractures, possible L ICA dissection on CTA, and R posterior acetabular fracture.    CNS: neuro checks  Resp: pulmonary toilet, team will consider decannulating within the next few days   GI/Nutrition: s/p peg tube for dysphagia, continue tube feeds and put to goal   /Renal: wilkinson, flomax, TOV 10/23 (48hrs of flomax)  Endo: dexamethasone 5 mg IV q 8, FS q6, ISS if needed  Skin: Repositioning for DTI prevention while in bed  Heme/DVT Prophylaxis: SCDs, lovenox  MSK: NWB to bilateral upper and Rlower ext  Dispo: Inpatient

## 2021-10-23 NOTE — PROGRESS NOTE ADULT - ATTENDING COMMENTS
Agree with above assessment.  The patient was seen and examined by me.  The patient is with no complaint.  The trach site is clean without drainage, the abdomen is soft and non distended, PEG site is clean and intact.  Continue with feeds as tolerated, trach care.  Will plan for suture removal from frontal scalp wound in the next 24 to 48 hours.  Continue precautions for COVID.

## 2021-10-23 NOTE — PHYSICAL THERAPY INITIAL EVALUATION ADULT - ADDITIONAL COMMENTS
Pt unable to verbally communicate, answered questions by nodding. Pt lives with wife and 2 adult children, in house with 1 RILEY, no steps inside. Pt was independent PTA and does not use or own DME. Pt will have wife and children to assist upon d/c as per pt.

## 2021-10-23 NOTE — PROGRESS NOTE ADULT - SUBJECTIVE AND OBJECTIVE BOX
INTERVAL HPI/OVERNIGHT EVENTS:    Patient evaluated at bedside. No acute distress. No acute events overnight.     MEDICATIONS  (STANDING):  ALBUTerol    90 MICROgram(s) HFA Inhaler  Puff(s)    albuterol/ipratropium for Nebulization 3 milliLiter(s) Nebulizer every 6 hours  artificial  tears Solution 1 Drop(s) Both EYES daily  cephalexin 500 milliGRAM(s) Oral every 12 hours  chlorhexidine 0.12% Liquid 15 milliLiter(s) Oral Mucosa <User Schedule>  dexAMETHasone     Tablet 5 milliGRAM(s) Oral every 8 hours  enalapril 5 milliGRAM(s) Oral daily  enoxaparin Injectable 40 milliGRAM(s) SubCutaneous daily  fosphenytoin IVPB 150 milliGRAM(s) PE IV Intermittent every 12 hours  influenza   Vaccine 0.5 milliLiter(s) IntraMuscular once  insulin lispro (ADMELOG) corrective regimen sliding scale   SubCutaneous three times a day before meals  melatonin 5 milliGRAM(s) Oral at bedtime  QUEtiapine 50 milliGRAM(s) Oral at bedtime    MEDICATIONS  (PRN):  acetaminophen     Tablet .. 975 milliGRAM(s) Oral every 8 hours PRN Mild Pain (1 - 3)  ALBUTerol    90 MICROgram(s) HFA Inhaler 2 Puff(s) Inhalation every 6 hours PRN Wheezing  oxyCODONE    Solution 5 milliGRAM(s) Oral every 6 hours PRN Moderate Pain (4 - 6)  oxyCODONE    Solution 10 milliGRAM(s) Oral every 6 hours PRN Severe Pain (7 - 10)      Vital Signs Last 24 Hrs  T(C): 38.3 (23 Oct 2021 00:00), Max: 38.3 (23 Oct 2021 00:00)  T(F): 101 (23 Oct 2021 00:00), Max: 101 (23 Oct 2021 00:00)  HR: 100 (23 Oct 2021 00:00) (68 - 100)  BP: 146/92 (23 Oct 2021 00:00) (133/58 - 153/75)  BP(mean): 106 (23 Oct 2021 00:00) (69 - 106)  RR: 26 (23 Oct 2021 00:00) (13 - 26)  SpO2: 99% (23 Oct 2021 00:00) (94% - 100%)    Physical exam  General : in no acute distress  HEENT : + trached, nasal splint intact, right sided nasal packing in place, no active epistaxis at this time, wires intact, occlusion stable, facial sensation intact, incision of forehead is C/D/I w/ no overt signs of infection at this time  Chest:: equal chest rise  Abd: Soft, NT,ND    I&O's Detail    21 Oct 2021 07:01  -  22 Oct 2021 07:00  --------------------------------------------------------  IN:  Total IN: 0 mL    OUT:    Indwelling Catheter - Urethral (mL): 3000 mL  Total OUT: 3000 mL    Total NET: -3000 mL      22 Oct 2021 07:01  -  23 Oct 2021 03:36  --------------------------------------------------------  IN:  Total IN: 0 mL    OUT:    Indwelling Catheter - Urethral (mL): 500 mL    Voided (mL): 300 mL  Total OUT: 800 mL    Total NET: -800 mL          LABS:                        9.2    8.41  )-----------( 191      ( 22 Oct 2021 02:12 )             27.2     10-22    137  |  101  |  12.2  ----------------------------<  156<H>  3.8   |  21.0<L>  |  0.53    Ca    7.5<L>      22 Oct 2021 02:12  Phos  1.3     10-22  Mg     2.4     10-22    TPro  x   /  Alb  3.1<L>  /  TBili  x   /  DBili  x   /  AST  x   /  ALT  x   /  AlkPhos  x   10-22          RADIOLOGY & ADDITIONAL STUDIES: INTERVAL HPI/OVERNIGHT EVENTS:  *Downgrade Note*  Patient evaluated at bedside. No acute distress. No acute events overnight.     MEDICATIONS  (STANDING):  ALBUTerol    90 MICROgram(s) HFA Inhaler  Puff(s)    albuterol/ipratropium for Nebulization 3 milliLiter(s) Nebulizer every 6 hours  artificial  tears Solution 1 Drop(s) Both EYES daily  cephalexin 500 milliGRAM(s) Oral every 12 hours  chlorhexidine 0.12% Liquid 15 milliLiter(s) Oral Mucosa <User Schedule>  dexAMETHasone     Tablet 5 milliGRAM(s) Oral every 8 hours  enalapril 5 milliGRAM(s) Oral daily  enoxaparin Injectable 40 milliGRAM(s) SubCutaneous daily  fosphenytoin IVPB 150 milliGRAM(s) PE IV Intermittent every 12 hours  influenza   Vaccine 0.5 milliLiter(s) IntraMuscular once  insulin lispro (ADMELOG) corrective regimen sliding scale   SubCutaneous three times a day before meals  melatonin 5 milliGRAM(s) Oral at bedtime  QUEtiapine 50 milliGRAM(s) Oral at bedtime    MEDICATIONS  (PRN):  acetaminophen     Tablet .. 975 milliGRAM(s) Oral every 8 hours PRN Mild Pain (1 - 3)  ALBUTerol    90 MICROgram(s) HFA Inhaler 2 Puff(s) Inhalation every 6 hours PRN Wheezing  oxyCODONE    Solution 5 milliGRAM(s) Oral every 6 hours PRN Moderate Pain (4 - 6)  oxyCODONE    Solution 10 milliGRAM(s) Oral every 6 hours PRN Severe Pain (7 - 10)      Vital Signs Last 24 Hrs  T(C): 38.3 (23 Oct 2021 00:00), Max: 38.3 (23 Oct 2021 00:00)  T(F): 101 (23 Oct 2021 00:00), Max: 101 (23 Oct 2021 00:00)  HR: 100 (23 Oct 2021 00:00) (68 - 100)  BP: 146/92 (23 Oct 2021 00:00) (133/58 - 153/75)  BP(mean): 106 (23 Oct 2021 00:00) (69 - 106)  RR: 26 (23 Oct 2021 00:00) (13 - 26)  SpO2: 99% (23 Oct 2021 00:00) (94% - 100%)    Physical exam  General : in no acute distress  HEENT : + trached, nasal splint intact, right sided nasal packing in place, no active epistaxis at this time, wires intact, occlusion stable, facial sensation intact, incision of forehead is C/D/I w/ no overt signs of infection at this time  Chest:: equal chest rise  Abd: Soft, NT,ND    I&O's Detail    21 Oct 2021 07:01  -  22 Oct 2021 07:00  --------------------------------------------------------  IN:  Total IN: 0 mL    OUT:    Indwelling Catheter - Urethral (mL): 3000 mL  Total OUT: 3000 mL    Total NET: -3000 mL      22 Oct 2021 07:01  -  23 Oct 2021 03:36  --------------------------------------------------------  IN:  Total IN: 0 mL    OUT:    Indwelling Catheter - Urethral (mL): 500 mL    Voided (mL): 300 mL  Total OUT: 800 mL    Total NET: -800 mL          LABS:                        9.2    8.41  )-----------( 191      ( 22 Oct 2021 02:12 )             27.2     10-22    137  |  101  |  12.2  ----------------------------<  156<H>  3.8   |  21.0<L>  |  0.53    Ca    7.5<L>      22 Oct 2021 02:12  Phos  1.3     10-22  Mg     2.4     10-22    TPro  x   /  Alb  3.1<L>  /  TBili  x   /  DBili  x   /  AST  x   /  ALT  x   /  AlkPhos  x   10-22          RADIOLOGY & ADDITIONAL STUDIES:

## 2021-10-24 LAB
ANION GAP SERPL CALC-SCNC: 12 MMOL/L — SIGNIFICANT CHANGE UP (ref 5–17)
BUN SERPL-MCNC: 18.8 MG/DL — SIGNIFICANT CHANGE UP (ref 8–20)
CALCIUM SERPL-MCNC: 7.9 MG/DL — LOW (ref 8.6–10.2)
CHLORIDE SERPL-SCNC: 102 MMOL/L — SIGNIFICANT CHANGE UP (ref 98–107)
CO2 SERPL-SCNC: 20 MMOL/L — LOW (ref 22–29)
CREAT SERPL-MCNC: 0.52 MG/DL — SIGNIFICANT CHANGE UP (ref 0.5–1.3)
GLUCOSE BLDC GLUCOMTR-MCNC: 120 MG/DL — HIGH (ref 70–99)
GLUCOSE BLDC GLUCOMTR-MCNC: 151 MG/DL — HIGH (ref 70–99)
GLUCOSE BLDC GLUCOMTR-MCNC: 153 MG/DL — HIGH (ref 70–99)
GLUCOSE BLDC GLUCOMTR-MCNC: 185 MG/DL — HIGH (ref 70–99)
GLUCOSE SERPL-MCNC: 151 MG/DL — HIGH (ref 70–99)
HCT VFR BLD CALC: 30.2 % — LOW (ref 39–50)
HGB BLD-MCNC: 10.4 G/DL — LOW (ref 13–17)
MAGNESIUM SERPL-MCNC: 2.2 MG/DL — SIGNIFICANT CHANGE UP (ref 1.6–2.6)
MCHC RBC-ENTMCNC: 30.2 PG — SIGNIFICANT CHANGE UP (ref 27–34)
MCHC RBC-ENTMCNC: 34.4 GM/DL — SIGNIFICANT CHANGE UP (ref 32–36)
MCV RBC AUTO: 87.8 FL — SIGNIFICANT CHANGE UP (ref 80–100)
PHOSPHATE SERPL-MCNC: 2.4 MG/DL — SIGNIFICANT CHANGE UP (ref 2.4–4.7)
PLATELET # BLD AUTO: 284 K/UL — SIGNIFICANT CHANGE UP (ref 150–400)
POTASSIUM SERPL-MCNC: 4.4 MMOL/L — SIGNIFICANT CHANGE UP (ref 3.5–5.3)
POTASSIUM SERPL-SCNC: 4.4 MMOL/L — SIGNIFICANT CHANGE UP (ref 3.5–5.3)
RBC # BLD: 3.44 M/UL — LOW (ref 4.2–5.8)
RBC # FLD: 12.5 % — SIGNIFICANT CHANGE UP (ref 10.3–14.5)
SODIUM SERPL-SCNC: 134 MMOL/L — LOW (ref 135–145)
WBC # BLD: 8.58 K/UL — SIGNIFICANT CHANGE UP (ref 3.8–10.5)
WBC # FLD AUTO: 8.58 K/UL — SIGNIFICANT CHANGE UP (ref 3.8–10.5)

## 2021-10-24 PROCEDURE — 99233 SBSQ HOSP IP/OBS HIGH 50: CPT

## 2021-10-24 RX ADMIN — OXYCODONE HYDROCHLORIDE 10 MILLIGRAM(S): 5 TABLET ORAL at 17:05

## 2021-10-24 RX ADMIN — Medication 500 MILLIGRAM(S): at 06:35

## 2021-10-24 RX ADMIN — FOSPHENYTOIN 106 MILLIGRAM(S) PE: 50 INJECTION INTRAMUSCULAR; INTRAVENOUS at 17:01

## 2021-10-24 RX ADMIN — Medication 1: at 17:00

## 2021-10-24 RX ADMIN — Medication 1 DROP(S): at 12:41

## 2021-10-24 RX ADMIN — Medication 5 MILLIGRAM(S): at 22:54

## 2021-10-24 RX ADMIN — Medication 5 MILLIGRAM(S): at 12:41

## 2021-10-24 RX ADMIN — FOSPHENYTOIN 106 MILLIGRAM(S) PE: 50 INJECTION INTRAMUSCULAR; INTRAVENOUS at 06:36

## 2021-10-24 RX ADMIN — OXYCODONE HYDROCHLORIDE 5 MILLIGRAM(S): 5 TABLET ORAL at 23:25

## 2021-10-24 RX ADMIN — Medication 5 MILLIGRAM(S): at 22:55

## 2021-10-24 RX ADMIN — Medication 3 MILLILITER(S): at 20:23

## 2021-10-24 RX ADMIN — OXYCODONE HYDROCHLORIDE 5 MILLIGRAM(S): 5 TABLET ORAL at 22:55

## 2021-10-24 RX ADMIN — QUETIAPINE FUMARATE 50 MILLIGRAM(S): 200 TABLET, FILM COATED ORAL at 22:54

## 2021-10-24 RX ADMIN — Medication 500 MILLIGRAM(S): at 17:03

## 2021-10-24 RX ADMIN — CHLORHEXIDINE GLUCONATE 15 MILLILITER(S): 213 SOLUTION TOPICAL at 17:02

## 2021-10-24 RX ADMIN — Medication 5 MILLIGRAM(S): at 06:36

## 2021-10-24 RX ADMIN — Medication 1: at 12:42

## 2021-10-24 RX ADMIN — CHLORHEXIDINE GLUCONATE 15 MILLILITER(S): 213 SOLUTION TOPICAL at 06:36

## 2021-10-24 RX ADMIN — ENOXAPARIN SODIUM 40 MILLIGRAM(S): 100 INJECTION SUBCUTANEOUS at 12:40

## 2021-10-24 RX ADMIN — Medication 1: at 06:36

## 2021-10-24 RX ADMIN — CHLORHEXIDINE GLUCONATE 15 MILLILITER(S): 213 SOLUTION TOPICAL at 12:40

## 2021-10-24 NOTE — OCCUPATIONAL THERAPY INITIAL EVALUATION ADULT - ADDITIONAL COMMENTS
As per EMR, pt has about 10 steps to enter with wideset bilateral HRs.  Once inside there are 5 and then 5 more to manage inside ([place]).  He drives.  He has no medical equipment at home.

## 2021-10-24 NOTE — PROGRESS NOTE ADULT - SUBJECTIVE AND OBJECTIVE BOX
Subjective:    No acute distress. No acute events overnight.     MEDICATIONS  (STANDING):  ALBUTerol    90 MICROgram(s) HFA Inhaler  Puff(s)    albuterol/ipratropium for Nebulization 3 milliLiter(s) Nebulizer every 6 hours  artificial  tears Solution 1 Drop(s) Both EYES daily  cephalexin 500 milliGRAM(s) Oral every 12 hours  chlorhexidine 0.12% Liquid 15 milliLiter(s) Oral Mucosa <User Schedule>  dexAMETHasone     Tablet 5 milliGRAM(s) Oral every 8 hours  enalapril 5 milliGRAM(s) Oral daily  enoxaparin Injectable 40 milliGRAM(s) SubCutaneous daily  fosphenytoin IVPB 150 milliGRAM(s) PE IV Intermittent every 12 hours  influenza   Vaccine 0.5 milliLiter(s) IntraMuscular once  insulin lispro (ADMELOG) corrective regimen sliding scale   SubCutaneous three times a day before meals  melatonin 5 milliGRAM(s) Oral at bedtime  QUEtiapine 50 milliGRAM(s) Oral at bedtime    MEDICATIONS  (PRN):  acetaminophen     Tablet .. 975 milliGRAM(s) Oral every 8 hours PRN Mild Pain (1 - 3)  ALBUTerol    90 MICROgram(s) HFA Inhaler 2 Puff(s) Inhalation every 6 hours PRN Wheezing  oxyCODONE    Solution 5 milliGRAM(s) Oral every 6 hours PRN Moderate Pain (4 - 6)  oxyCODONE    Solution 10 milliGRAM(s) Oral every 6 hours PRN Severe Pain (7 - 10)      Vital Signs Last 24 Hrs  T(C): 37.8 (23 Oct 2021 08:00), Max: 37.8 (23 Oct 2021 08:00)  T(F): 100 (23 Oct 2021 08:00), Max: 100 (23 Oct 2021 08:00)  HR: 89 (24 Oct 2021 00:00) (73 - 102)  BP: 114/72 (24 Oct 2021 00:00) (114/72 - 147/77)  BP(mean): 85 (24 Oct 2021 00:00) (85 - 90)  RR: 24 (24 Oct 2021 00:00) (20 - 32)  SpO2: 97% (24 Oct 2021 00:00) (97% - 100%)      10-22  -  10-23  --------------------------------------------------------  IN:  Total IN: 0 mL    OUT:    Indwelling Catheter - Urethral (mL): 500 mL    Intermittent Catheterization - Urethral (mL): 500 mL    Post-Void Residual per Intermittent Catheterization (mL): 0 mL    Voided (mL): 420 mL  Total OUT: 1420 mL    Total NET: -1420 mL      10-23  -  10-24  --------------------------------------------------------  IN:    IV PiggyBack: 50 mL    Jevity 1.5: 630 mL  Total IN: 680 mL    OUT:    Voided (mL): 575 mL  Total OUT: 575 mL    Total NET: 105 mL          Physical Exam:    General : in no acute distress  HEENT : + trached, incision of forehead is C/D/I w/ no overt signs of infection at this time  Chest:: equal chest rise  Abd: Soft, NT,ND      LABS:                        10.6   11.21 )-----------( 262      ( 23 Oct 2021 09:13 )             30.0     10-23    132<L>  |  97<L>  |  13.4  ----------------------------<  186<H>  3.9   |  20.0<L>  |  0.49<L>    Ca    7.7<L>      23 Oct 2021 09:13  Phos  1.4     10-23  Mg     2.0     10-23    TPro  x   /  Alb  3.1<L>  /  TBili  x   /  DBili  x   /  AST  x   /  ALT  x   /  AlkPhos  x   10-22

## 2021-10-24 NOTE — OCCUPATIONAL THERAPY INITIAL EVALUATION ADULT - VISUAL ACUITY
No new visual complaints.  Pt does not wear glasses.  Ongoing functional visual assessment.  Pt with difficulty keeping eyes open during eval.

## 2021-10-24 NOTE — PROGRESS NOTE ADULT - ASSESSMENT
· Assessment	  61yM, BIBA as trauma B s/p fall from ladder (approx 25 feet), found to have multiple traumatic injuries including scattered SAH, L SDH, multiple facial fractures, including maxillary sinus and nasal bone fractures, Lefort II fracture, B/L distal radial fractures, possible L ICA dissection on CTA, and R posterior acetabular fracture.    Plan:  CNS: neuro checks  Resp: pulmonary toilet, team will consider decannulating within the next few days   GI/Nutrition: s/p peg tube for dysphagia, continue tube feeds and put to goal   Endo: dexamethasone 5 mg IV q 8, FS q6, ISS if needed  Skin: Repositioning for DTI prevention while in bed  Heme/DVT Prophylaxis: SCDs, lovenox  MSK: NWB to bilateral upper and lower ext  Dispo: Inpatient

## 2021-10-24 NOTE — PROGRESS NOTE ADULT - ATTENDING COMMENTS
Agree with above assessment.  The patient was seen and examined by me.  The patient is without new events overnight.  Frontal scalp laceration site clean and dry sutures in place.  Trach site clean and intact.  Abdomen is soft non distended, PEG tube is intact without leak.  For suture removal today.  Continue with supportive care, continue with COVID precautions.

## 2021-10-24 NOTE — OCCUPATIONAL THERAPY INITIAL EVALUATION ADULT - PERTINENT HX OF CURRENT PROBLEM, REHAB EVAL
fall from 25' ladder while trimming hedges.  Resulting injuries includeTraumatic SAH, left parietotemporal SDH, Lefort 2 fracture, sphenoid fracture, bilateral distal radius fractures, right acetabular fracture. Incidentally found to be COVID positive. s/p ORIF for his frontal sinus and his [place] II fracture, bilateral ORIF for distal radial fractures.

## 2021-10-24 NOTE — OCCUPATIONAL THERAPY INITIAL EVALUATION ADULT - GENERAL OBSERVATIONS, REHAB EVAL
Pt received supine in bed, +IV, +cardiac monitor with , +bilateral wrist splints, +trach, +PEG, 1:1 present, pt lethargic but pt agreeable to OT eval.

## 2021-10-24 NOTE — OCCUPATIONAL THERAPY INITIAL EVALUATION ADULT - PERSONAL SAFETY AND JUDGMENT, REHAB EVAL
appears grossly intact during eval but had 1:1 for intermittent confusion and to protect pt from pulling at lines.

## 2021-10-24 NOTE — OCCUPATIONAL THERAPY INITIAL EVALUATION ADULT - RANGE OF MOTION EXAMINATION, UPPER EXTREMITY
Right shoulder 1/2 active ROM, right elbow 3/4 active ROM, right wrist and MPs immobilized by splint, right PIPs and DIPs actively flexes and extends within constraints of splints.  Left shoulder 1/4 active ROM, left elbow 1/3 active ROM, left wrist and MPs immobilized in splint, pt able to flex and extend PIPs and DIPs as splint allows.

## 2021-10-24 NOTE — OCCUPATIONAL THERAPY INITIAL EVALUATION ADULT - LEVEL OF INDEPENDENCE: BED TO CHAIR, REHAB EVAL
assess.  Mechanical lift recommended bed to chair due to multiple WB restrictions, multiple lines.  RN made aware. Ongoing functional assessment to follow.

## 2021-10-24 NOTE — OCCUPATIONAL THERAPY INITIAL EVALUATION ADULT - LEVEL OF CONSCIOUSNESS, OT EVAL
but fatigued.  Pt kept eyes closed throughout most of eval but was listening and answering questions./alert

## 2021-10-24 NOTE — OCCUPATIONAL THERAPY INITIAL EVALUATION ADULT - LEVEL OF INDEPENDENCE:TOILET, OT EVAL
Pt lets staff know when he needs to use urinal by waving arm.  Bedpan for BM./dependent (less than 25% patients effort)

## 2021-10-25 LAB
GLUCOSE BLDC GLUCOMTR-MCNC: 175 MG/DL — HIGH (ref 70–99)
GLUCOSE BLDC GLUCOMTR-MCNC: 200 MG/DL — HIGH (ref 70–99)
GLUCOSE BLDC GLUCOMTR-MCNC: 211 MG/DL — HIGH (ref 70–99)

## 2021-10-25 PROCEDURE — 99233 SBSQ HOSP IP/OBS HIGH 50: CPT | Mod: GC

## 2021-10-25 RX ORDER — DIVALPROEX SODIUM 500 MG/1
250 TABLET, DELAYED RELEASE ORAL
Refills: 0 | Status: DISCONTINUED | OUTPATIENT
Start: 2021-10-25 | End: 2021-10-28

## 2021-10-25 RX ORDER — ALBUTEROL 90 UG/1
2 AEROSOL, METERED ORAL EVERY 6 HOURS
Refills: 0 | Status: DISCONTINUED | OUTPATIENT
Start: 2021-10-25 | End: 2021-10-25

## 2021-10-25 RX ORDER — ALBUTEROL 90 UG/1
2 AEROSOL, METERED ORAL ONCE
Refills: 0 | Status: DISCONTINUED | OUTPATIENT
Start: 2021-10-25 | End: 2021-10-25

## 2021-10-25 RX ORDER — ALBUTEROL 90 UG/1
AEROSOL, METERED ORAL
Refills: 0 | Status: DISCONTINUED | OUTPATIENT
Start: 2021-10-25 | End: 2021-10-25

## 2021-10-25 RX ADMIN — Medication 5 MILLIGRAM(S): at 13:27

## 2021-10-25 RX ADMIN — Medication 5 MILLIGRAM(S): at 05:29

## 2021-10-25 RX ADMIN — Medication 1 DROP(S): at 11:42

## 2021-10-25 RX ADMIN — OXYCODONE HYDROCHLORIDE 10 MILLIGRAM(S): 5 TABLET ORAL at 13:26

## 2021-10-25 RX ADMIN — CHLORHEXIDINE GLUCONATE 15 MILLILITER(S): 213 SOLUTION TOPICAL at 18:15

## 2021-10-25 RX ADMIN — FOSPHENYTOIN 106 MILLIGRAM(S) PE: 50 INJECTION INTRAMUSCULAR; INTRAVENOUS at 05:28

## 2021-10-25 RX ADMIN — Medication 1: at 11:42

## 2021-10-25 RX ADMIN — Medication 5 MILLIGRAM(S): at 21:33

## 2021-10-25 RX ADMIN — CHLORHEXIDINE GLUCONATE 15 MILLILITER(S): 213 SOLUTION TOPICAL at 02:40

## 2021-10-25 RX ADMIN — CHLORHEXIDINE GLUCONATE 15 MILLILITER(S): 213 SOLUTION TOPICAL at 05:29

## 2021-10-25 RX ADMIN — Medication 2: at 06:54

## 2021-10-25 RX ADMIN — ENOXAPARIN SODIUM 40 MILLIGRAM(S): 100 INJECTION SUBCUTANEOUS at 11:41

## 2021-10-25 RX ADMIN — Medication 500 MILLIGRAM(S): at 18:17

## 2021-10-25 RX ADMIN — QUETIAPINE FUMARATE 50 MILLIGRAM(S): 200 TABLET, FILM COATED ORAL at 21:34

## 2021-10-25 RX ADMIN — CHLORHEXIDINE GLUCONATE 15 MILLILITER(S): 213 SOLUTION TOPICAL at 11:40

## 2021-10-25 RX ADMIN — Medication 24 MILLIGRAM(S): at 21:33

## 2021-10-25 RX ADMIN — Medication 1: at 18:16

## 2021-10-25 RX ADMIN — Medication 500 MILLIGRAM(S): at 05:28

## 2021-10-25 RX ADMIN — Medication 5 MILLIGRAM(S): at 05:28

## 2021-10-25 RX ADMIN — Medication 3 MILLILITER(S): at 04:26

## 2021-10-25 RX ADMIN — DIVALPROEX SODIUM 250 MILLIGRAM(S): 500 TABLET, DELAYED RELEASE ORAL at 18:15

## 2021-10-25 NOTE — PROGRESS NOTE ADULT - ASSESSMENT
61yM, BIBA as trauma B s/p fall from ladder (approx 25 feet), found to have multiple traumatic injuries including scattered SAH, L SDH, multiple facial fractures, including maxillary sinus and nasal bone fractures, Lefort III fracture, B/L distal radial fractures, possible L ICA dissection on CTA, and R posterior acetabular fracture.  Plan:  CNS: neuro checks  Resp: pulmonary toilet, team will consider decannulating within the next few days   GI/Nutrition: s/p peg tube for dysphagia, continue tube feeds and put to goal   Endo: dexamethasone 5 mg IV q 8, FS q6, ISS if needed  Skin: Repositioning for DTI prevention while in bed  Heme/DVT Prophylaxis: SCDs, lovenox  MSK: NWB to bilateral upper and lower ext  Dispo: Inpatient  Patient was seen by [facility] today , they anticipate , acute rehab.

## 2021-10-25 NOTE — PROGRESS NOTE ADULT - ASSESSMENT
62 y/o M w/ lefort 2/frontal sinus fx (arch bars and wires)    Plan  - continue Peridex QID  - continue ABX, medrol dose juan luis  - Baci BID to facial laceration, and clean area with water and dial soap  - Keep head of bed elevated  - appreciate care of ICU

## 2021-10-25 NOTE — PROGRESS NOTE ADULT - SUBJECTIVE AND OBJECTIVE BOX
Pt is asleep but arousable to verbal stimuli.  He is s/p ORIF of frontal sinus fx/lefort 2 fx w/ arch bar placement w/ wires and nasal splint/packing.  He offers no acute complaints at this time. pt currently on peridex QID, abx, and steroids.     ICU Vital Signs Last 24 Hrs  T(C): 37.5 (25 Oct 2021 07:38), Max: 37.5 (25 Oct 2021 07:38)  T(F): 99.5 (25 Oct 2021 07:38), Max: 99.5 (25 Oct 2021 07:38)  HR: 70 (25 Oct 2021 07:38) (70 - 80)  BP: 118/64 (25 Oct 2021 07:38) (117/63 - 124/65)  BP(mean): 83 (25 Oct 2021 04:00) (82 - 83)  ABP: --  ABP(mean): --  RR: 20 (25 Oct 2021 07:38) (20 - 21)  SpO2: 96% (25 Oct 2021 07:38) (96% - 97%)                          10.4   8.58  )-----------( 284      ( 24 Oct 2021 06:55 )             30.2       Physical exam  General : in no acute distress  HEENT : No active epistaxis at this time, wires intact, occlusion stable, incision of forehead is C/D/I w/ no overt signs of infection at this time  Chest:: equal chest rise

## 2021-10-25 NOTE — PROGRESS NOTE ADULT - SUBJECTIVE AND OBJECTIVE BOX
Acute Care Surgery/Trauma Surgery Progress Note:  61yM, BIBA as trauma B s/p fall from ladder (approx 25 feet), found to have multiple traumatic injuries including scattered SAH, L SDH, multiple facial fractures, including maxillary sinus and nasal bone fractures, Lefort III fracture, B/L distal radial fractures, possible L ICA dissection on CTA, and R posterior acetabular fracture.      Diet, NPO with Tube Feed:   Tube Feeding Modality: Gastrostomy  Jevity 1.5 Riccardo (JEVITY1.5)  Total Volume for 24 Hours (mL): 1080  Continuous  Starting Tube Feed Rate mL per Hour: 10  Increase Tube Feed Rate by (mL): 10     Every 8 hours  Until Goal Tube Feed Rate (mL per Hour): 45  Tube Feed Duration (in Hours): 24  Tube Feed Start Time: 19:00 (10-21-21 @ 18:30)      Scheduled Medications:   ALBUTerol    90 MICROgram(s) HFA Inhaler  Puff(s)    albuterol/ipratropium for Nebulization 3 milliLiter(s) Nebulizer every 6 hours  artificial  tears Solution 1 Drop(s) Both EYES daily  cephalexin 500 milliGRAM(s) Oral every 12 hours  chlorhexidine 0.12% Liquid 15 milliLiter(s) Oral Mucosa <User Schedule>  dexAMETHasone     Tablet 5 milliGRAM(s) Oral every 8 hours  enalapril 5 milliGRAM(s) Oral daily  enoxaparin Injectable 40 milliGRAM(s) SubCutaneous daily  fosphenytoin IVPB 150 milliGRAM(s) PE IV Intermittent every 12 hours  influenza   Vaccine 0.5 milliLiter(s) IntraMuscular once  insulin lispro (ADMELOG) corrective regimen sliding scale   SubCutaneous three times a day before meals  melatonin 5 milliGRAM(s) Oral at bedtime  QUEtiapine 50 milliGRAM(s) Oral at bedtime    PRN Medications:  acetaminophen     Tablet .. 975 milliGRAM(s) Oral every 8 hours PRN Mild Pain (1 - 3)  ALBUTerol    90 MICROgram(s) HFA Inhaler 2 Puff(s) Inhalation every 6 hours PRN Wheezing  oxyCODONE    Solution 5 milliGRAM(s) Oral every 6 hours PRN Moderate Pain (4 - 6)  oxyCODONE    Solution 10 milliGRAM(s) Oral every 6 hours PRN Severe Pain (7 - 10)      Objective:   T(F): 99.1 (10-25 @ 00:00), Max: 100.3 (10-24 @ 08:00)  HR: 74 (10-25 @ 00:00) (74 - 80)  BP: 117/63 (10-25 @ 00:00) (117/63 - 124/65)  BP(mean): 82 (10-24 @ 12:00) (82 - 87)  RR: 20 (10-25 @ 00:00) (20 - 24)  SpO2: 97% (10-25 @ 00:00) (97% - 100%)      Physical Exam:   GEN: patient sweating with very limited mobility   RESP: respirations are unlabored, no accessory muscle use, no conversational dyspnea tracheostomy is not obstructed  CVS: RRR  GI: Abdomen soft, non-tender, non-distended, no rebound tenderness / guarding  extremities,  upper extremities  immobilization    I&O's    10-23 @ 07:01  -  10-24 @ 07:00  --------------------------------------------------------  IN:    IV PiggyBack: 100 mL    Jevity 1.5: 945 mL  Total IN: 1045 mL    OUT:    Voided (mL): 975 mL  Total OUT: 975 mL    Total NET: 70 mL      10-24 @ 07:01  -  10-25 @ 01:58  --------------------------------------------------------  IN:    IV PiggyBack: 50 mL    Jevity 1.5: 540 mL  Total IN: 590 mL    OUT:    Voided (mL): 950 mL  Total OUT: 950 mL    Total NET: -360 mL          LABS:                        10.4   8.58  )-----------( 284      ( 24 Oct 2021 06:55 )             30.2     10-24    134<L>  |  102  |  18.8  ----------------------------<  151<H>  4.4   |  20.0<L>  |  0.52    Ca    7.9<L>      24 Oct 2021 06:55  Phos  2.4     10-24  Mg     2.2     10-24

## 2021-10-25 NOTE — PROGRESS NOTE ADULT - SUBJECTIVE AND OBJECTIVE BOX
Patient seen this morning. Complains of generalized headache and pelvic pain.    REVIEW OF SYSTEMS  + headaches, + memory loss, + loss of strength, + joint pain, + joint swelling, + muscle pain      FUNCTIONAL PROGRESS  10/24 OT  Bed Mobility: Supine to Sit:     · Level of [place]	maximum assist (25% patients effort); Pt assists both LEs as able.  · Physical Assist/Nonphysical Assist	1 person assist; verbal cues    Bed Mobility Analysis:     · Bed Mobility Limitations	decreased ability to use arms for pushing/pulling; decreased ability to use legs for bridging/pushing  · Impairments Contributing to Impaired Bed Mobility	impaired balance; impaired coordination; pain; decreased ROM; decreased strength    Transfer: Bed to Chair:    Bed to Chair Transfer:    Transfer Skill: Bed to Chair   · Level of [place]	assess.  Mechanical lift recommended bed to chair due to multiple WB restrictions, multiple lines.  RN made aware. Ongoing functional assessment to follow.  · Weight-Bearing Restrictions	NWB bilateral UE but can WB on elbows, TTWB right LE.    Transfer: Chair to Bed:     · Level of [place]	unable to perform; use of mechanical lift recommended at this time.  Ongoing functional assessment to follow.    Bed to Chair Safety Analysis:     · Impairments Contributing to Impaired Transfers	impaired balance; impaired coordination; pain; decreased ROM; decreased strength  · Transfer Safety Concerns Noted	inability to maintain weight-bearing restrictions w/o assist    Transfer: Sit to Stand:     · Level of [place]	unable to perform; assess as able with appropriate device and 2 person assist.    Sit/Stand Transfer Safety Analysis:     · Transfer Safety Concerns Noted	inability to maintain weight-bearing restrictions w/o assist  · Impairments Contributing to Impaired Transfers	impaired balance; impaired coordination; decreased ROM; pain; decreased strength    Transfer: Toilet Transfer:     · Level of [place]	unable to perform    Sensory Examination:     Grossly Intact:   · Gross Sensory Examination	Grossly Intact; bilateral UEs - all digits    · Balance Skills	sitting balance P+/F- at edge of bed      Light Touch Sensation:   · Left UE	within normal limits  · Right UE	within normal limits      Visual Assessment:    Visual Assessment:    Visual Assessment:   · Visual Acuity	No new visual complaints.  Pt does not wear glasses.  Ongoing functional visual assessment.  Pt with difficulty keeping eyes open during eval.  · Visual Tracking	normal    Fine Motor Coordination:     Fine Motor Coordination:   · Left Hand, Finger to Nose	unable to perform  · Right Hand, Finger to Nose	unable to perform  · Left Hand Thumb/Finger Opposition Skills	unable to perform  · Right Hand Thumb/Finger Opposition Skills	unable to perform  · Left Hand, Manipulation of Objects	severe impairment  · Right Hand, Manipulation of Objects	severe impairment  · Right Hand, Graphomotor Skills	pt is right handed    Bathing Training:     · Level of [place]	dependent (less than 25% patients effort)    Upper Body Dressing Training:     · Level of [place]	maximum assist (25% patients effort)    Lower Body Dressing Training:     · Level of [place]	dependent (less than 25% patients effort)    Toilet Hygiene Training:     · Level of [place]	dependent (less than 25% patients effort); Pt lets staff know when he needs to use urinal by waving arm.  Bedpan for BM.    Grooming Training:     · Level of [place]	dependent (less than 25% patients effort)    Eating/Self-Feeding Training:     · Level of [place]	dependent (less than 25% patients effort); +PEG due to facial surgeries, jaw wired shut      10/23 PT  Bed Mobility: Sit to Supine:     · Level of [place]	dependent (less than 25% patients effort)  · Physical Assist/Nonphysical Assist	1 person assist; nonverbal cues (demo/gestures); verbal cues    Bed Mobility: Supine to Sit:     · Level of [place]	dependent (less than 25% patients effort)  · Physical Assist/Nonphysical Assist	nonverbal cues (demo/gestures); 1 person assist; verbal cues    Bed Mobility Analysis:     · Bed Mobility Limitations	decreased ability to use legs for bridging/pushing; decreased ability to use arms for pushing/pulling; impaired ability to control trunk for mobility  · Impairments Contributing to Impaired Bed Mobility	decreased strength; impaired balance; cognition; pain    Transfer: Sit to Stand:     · Level of [place]	unable to perform    Transfer: Stand to Sit:     · Level of [place]	unable to perform    Gait Skills:     · Level of [place]	unable to perform    Stair Negotiation:     · Level of [place]	unable to perform    Balance Skills Assessment:     · Sitting Balance: Static	poor balance  · Systems Impairment Contributing to Balance Disturbance	musculoskeletal  · Identified Impairments Contributing to Balance Disturbance	decreased strength; pain    Sensory Examination:    Sensory Examination:    Grossly Intact:   · Gross Sensory Examination	Grossly Intact    · Coordination Assessed	unable to assess      Proprioception:   · Coordination Assessed	unable to assess        VITALS  T(C): 37.5 (10-25-21 @ 07:38), Max: 37.5 (10-25-21 @ 07:38)  HR: 70 (10-25-21 @ 07:38) (70 - 74)  BP: 118/64 (10-25-21 @ 07:38) (117/63 - 121/67)  RR: 20 (10-25-21 @ 07:38) (20 - 21)  SpO2: 96% (10-25-21 @ 07:38) (96% - 97%)  Wt(kg): --    MEDICATIONS   acetaminophen     Tablet .. 975 milliGRAM(s) every 8 hours PRN  ALBUTerol    90 MICROgram(s) HFA Inhaler 2 Puff(s) every 6 hours PRN  ALBUTerol    90 MICROgram(s) HFA Inhaler     ALBUTerol    90 MICROgram(s) HFA Inhaler 2 Puff(s) once  ALBUTerol    90 MICROgram(s) HFA Inhaler 2 Puff(s) every 6 hours  artificial  tears Solution 1 Drop(s) daily  cephalexin 500 milliGRAM(s) every 12 hours  chlorhexidine 0.12% Liquid 15 milliLiter(s) <User Schedule>  dexAMETHasone     Tablet 5 milliGRAM(s) every 8 hours  enalapril 5 milliGRAM(s) daily  enoxaparin Injectable 40 milliGRAM(s) daily  fosphenytoin IVPB 150 milliGRAM(s) PE every 12 hours  influenza   Vaccine 0.5 milliLiter(s) once  insulin lispro (ADMELOG) corrective regimen sliding scale   three times a day before meals  melatonin 5 milliGRAM(s) at bedtime  oxyCODONE    Solution 5 milliGRAM(s) every 6 hours PRN  oxyCODONE    Solution 10 milliGRAM(s) every 6 hours PRN  QUEtiapine 50 milliGRAM(s) at bedtime      RECENT LABS/IMAGING                          10.4   8.58  )-----------( 284      ( 24 Oct 2021 06:55 )             30.2     10-24    134<L>  |  102  |  18.8  ----------------------------<  151<H>  4.4   |  20.0<L>  |  0.52    Ca    7.9<L>      24 Oct 2021 06:55  Phos  2.4     10-24  Mg     2.2     10-24    CT Head/C-spine/Maxillofacial 10/17:  There is an acute fracture involving the frontal bone which traverse the frontal sinus as detailed with subarachnoid hemorrhage in the basal cisterns and acute subdural hemorrhages as described. There is no midline shift or herniation. There is an acute right LeFort fracture II. Complex nasoethmoidal orbital fractures as described in details above. The skull base fracture involving the left sphenoid bone traverses through the anterior wall of the carotid canal and left clinoid processes, therefore CT angiography of the brain is recommended to exclude vascular dissection. Acute fracture of the left fovea ethmoidalis ethmoidalis for which CSF leak cannot be entirely excluded. No cervical spine fracture.    CT C/A/P 10/17: Stranding of the mediastinal fat adjacent to the aorta, esophagus, and azygos vein concerning for mediastinal hematoma. No evidence of active extravasation or acute aortic injury. Findings may reflect venous injury. Small patchy airspace opacity at the left lung base may reflect small contusion. Comminuted fracture of the right acetabulum.    Xray Knee 3 Views, Bilateral 10/17: No acute bony pathology.    Xray Femur 2 Views, Bilat 10/17: No acute bony pathology.    Xray Wrist 3 Views, Bilateral 10/17: Three views of the right wrist and three views of the left wrist show comminuted fracture of the distal left radius with comminuted buckle fracture and dorsal angulation of the distal right radius.    Xray Forearm, Bilateral 10/17:  Two views of the right forearm and two views of the left forearm show comminuted fractureof the distal left radius and comminuted buckle fracture of the distal right radius.    Xray Tibia + Fibula 2 Views, Bilateral 10/17:  Two views of the right leg and two views of the left leg show no evidence of fracture nor destructive change. The joint spaces are maintained.    CTA Head 10/17: There is no large vessel occlusion or aneurysm involving major proximal intracranial arteries.    CTA NECK 10/17: Hypodensities in the left internal carotid artery in the petrous portion of the left temporal bone, for which multifocal dissection is difficult to exclude secondary to adjacent streak artifact. Further correlation with MRI of neck is suggested including T1 fat saturated images. There is no stenosis involving major neck arteries.    Xray Wrist 3 Views, Right 10/17: Following reduction fracture segments are in anatomical alignment.  Fracture is stabilized by applied cast.    Xray Hand 3 Views, Left 10/17: There is a comminuted intra-articular fracture distal LEFT radial metaphysis and epiphysis. Carpal bones, metacarpal bones intact. There is a dorsal dislocation of the third DIP joint. Dorsal dislocation of fourth interphalangeal joint. Remaining osseous structures intact.    Xray Hand Post Reduction, Left 10/17: Following reduction distal radius fracture segments are in anatomical alignment. Reduction of dislocated third DIP joint and fourth interphalangeal joint. Cast applied. Reduction of fracture segments and dislocated phalanges as described.    Xray Wrist Post Reduction AP/Lat, Left 10/17: Following reduction fracture segments are in anatomical alignment.  Fracture is stabilized by applied cast.    CT Head 10/17: Slightly increased frontal parenchymal hemorrhage just above the cribriform plate compared with 11:46 AM. Otherwise no change in traumatic subarachnoid and subdural hemorrhage.    Xray Shoulder 2 Views, Left 10/17: No acute radiographic osseous pathology.    CT Knee No Cont, Right 10/18:  1. No acute fracture or dislocation.  2.  Mild osteoarthritis of the patellofemoral compartment of the joint.  3.  Small knee joint effusion.  4.  Subcutaneous edema along the anteromedial aspect of the joint which may be secondary to direct contusion.    Xray Elbow AP + Lateral, Left 10/18: Suggestion of slight CHF. Blunting of the left elbow.    Brain MRI 10/19:  1.  Bilateral frontal, parietal occipital, and parafalcine subdural hemorrhages. Subarachnoid hemorrhage along the bilateral cerebral convexities and in the basal cisterns. Bilateral frontal hemorrhagic contusions, right greater than left. Bilateral occipital hemorrhagic contusions. No significant midline shift, hydrocephalus, or effacement of basal cisterns.  2.  Multiple skull, skull base and facial fractures, as seen on prior CTs, raising the possibility of CSF leak.    Brain MRA 10/19: No large vessel occlusion or aneurysm.    Neck MRA 10/19: No hemodynamically significant stenosis of the bilateral cervical ICAs by NASCET criteria.    CT Wrist No Cont, Left 10/19:  1.  Comminuted mildly depressed fracture the distal radius.  2.  Avulsion fracture of the triquetrum suspect acute on chronic.  3.  Moderate soft tissue swelling about the wrist.  4.  Cystic changes in the lunate and proximal scaphoid likely degenerative.    CT Wrist No Cont, Right 10/19:  1.  Comminuted and impacted fracture of the distal radius with volar angulation the fracture.  2.  Small avulsion fracture at the dorsal distal triquetrum.  3.  Small avulsion fracture at the ulnar styloid process.  4.  Soft tissue swelling.    Xray Wrist 3 Views, Bilateral 10/21: Anatomical alignment post ORIF of bilateral distal radial fractures    Xray Pelvis AP only 10/21: Two views of the pelvis demonstrate a mildly displaced right acetabular fracture. Mild degenerative change otherwise evident.      ----------------------------------------------------------------------------------------  PHYSICAL EXAM  Constitutional - NAD, Uncomfortable  HEENT - Multiple facial injuries. Eyelid swelling. Jaw swelling.  Neck - Tracheostomy with speaking valve  Chest - Breathing comfortably, No wheezing  Cardiovascular - S1S2   Abdomen - Soft, +PEG   Extremities - No C/C/E, No calf tenderness   Neurologic Exam -                    Cognitive - Able to communicate his pain. Able to self-suction secretions     Communication - Able to communicate with difficulty due to jaw surgery     Cranial Nerves - CN 2-12 grossly intact with limitations on testing due to facial swelling     Motor - Generally pain limited. Able to move all extremities against gravity but more limited in hip flexion due to pain.  Psychiatric - Anxious due to pain  ----------------------------------------------------------------------------------------  ASSESSMENT/PLAN  61yMale with functional deficits after falling off a ladder and suffering multiple trauma  TBI - maximize sleep/wake cycle for recovery. Maximize nutrition. Continue to monitor. Recommend discontinuing fosphenytoin.  Restlessness - This is related to pain from his fractures as well as headache. He is on Seroquel HS. Recommend Depakote 250 mg BID to start with for headache prophylaxis. Recommend scheduling the Tylenol to maximize comfort and pain relief.  Bilateral Radial Fractures - s/p ORIF. NWB  Right Acetabular Fracture - TTWB  COVID - monitor  Sleep - melatonin, seroquel  Dysphagia - PEG/TF. Speech eval pending  Pain - Tylenol, Oxycodone  DVT PPX - SCDs, Lovenox  Rehab -   Recommend [facility], patient DOES NOT meet acute inpatient rehabilitation criteria. Patient needs a more prolonged stay to achieve transition to community.     Will continue to follow. Functional progress will determine ongoing rehab dispo recommendations, which may change.    Continue bedside therapy as well as OOB throughout the day with mobilization by staff to maintain cardiopulmonary function and prevention of secondary complications related to debility.     Discussed with rehab team.

## 2021-10-25 NOTE — PROGRESS NOTE ADULT - ATTENDING COMMENTS
61yM, BIBA as trauma B s/p fall from ladder (approx 25 feet), found to have multiple traumatic injuries including scattered SAH, L SDH, multiple facial fractures, including maxillary sinus and nasal bone fractures, Lefort III fracture, B/L distal radial fractures, possible L ICA dissection on CTA, and R posterior acetabular fracture.  Bilateral BS  Hemodynamically stable  Abdomen soft    Diet, NPO with Tube Feed:   Tube Feeding Modality: Gastrostomy  Patient awaiting DC

## 2021-10-26 LAB
GLUCOSE BLDC GLUCOMTR-MCNC: 154 MG/DL — HIGH (ref 70–99)
GLUCOSE BLDC GLUCOMTR-MCNC: 161 MG/DL — HIGH (ref 70–99)
GLUCOSE BLDC GLUCOMTR-MCNC: 169 MG/DL — HIGH (ref 70–99)
GLUCOSE BLDC GLUCOMTR-MCNC: 190 MG/DL — HIGH (ref 70–99)
SARS-COV-2 RNA SPEC QL NAA+PROBE: DETECTED

## 2021-10-26 PROCEDURE — 99231 SBSQ HOSP IP/OBS SF/LOW 25: CPT

## 2021-10-26 PROCEDURE — 99233 SBSQ HOSP IP/OBS HIGH 50: CPT

## 2021-10-26 RX ADMIN — Medication 4 MILLIGRAM(S): at 10:42

## 2021-10-26 RX ADMIN — CHLORHEXIDINE GLUCONATE 15 MILLILITER(S): 213 SOLUTION TOPICAL at 00:13

## 2021-10-26 RX ADMIN — CHLORHEXIDINE GLUCONATE 15 MILLILITER(S): 213 SOLUTION TOPICAL at 17:05

## 2021-10-26 RX ADMIN — Medication 5 MILLIGRAM(S): at 22:49

## 2021-10-26 RX ADMIN — DIVALPROEX SODIUM 250 MILLIGRAM(S): 500 TABLET, DELAYED RELEASE ORAL at 06:20

## 2021-10-26 RX ADMIN — ENOXAPARIN SODIUM 40 MILLIGRAM(S): 100 INJECTION SUBCUTANEOUS at 15:47

## 2021-10-26 RX ADMIN — OXYCODONE HYDROCHLORIDE 10 MILLIGRAM(S): 5 TABLET ORAL at 22:49

## 2021-10-26 RX ADMIN — Medication 4 MILLIGRAM(S): at 22:50

## 2021-10-26 RX ADMIN — CHLORHEXIDINE GLUCONATE 15 MILLILITER(S): 213 SOLUTION TOPICAL at 12:00

## 2021-10-26 RX ADMIN — Medication 500 MILLIGRAM(S): at 17:05

## 2021-10-26 RX ADMIN — DIVALPROEX SODIUM 250 MILLIGRAM(S): 500 TABLET, DELAYED RELEASE ORAL at 17:05

## 2021-10-26 RX ADMIN — Medication 1: at 08:34

## 2021-10-26 RX ADMIN — Medication 1: at 17:07

## 2021-10-26 RX ADMIN — Medication 975 MILLIGRAM(S): at 06:06

## 2021-10-26 RX ADMIN — CHLORHEXIDINE GLUCONATE 15 MILLILITER(S): 213 SOLUTION TOPICAL at 06:04

## 2021-10-26 RX ADMIN — Medication 4 MILLIGRAM(S): at 17:08

## 2021-10-26 RX ADMIN — Medication 8 MILLIGRAM(S): at 23:14

## 2021-10-26 RX ADMIN — OXYCODONE HYDROCHLORIDE 5 MILLIGRAM(S): 5 TABLET ORAL at 11:00

## 2021-10-26 RX ADMIN — OXYCODONE HYDROCHLORIDE 10 MILLIGRAM(S): 5 TABLET ORAL at 15:51

## 2021-10-26 RX ADMIN — QUETIAPINE FUMARATE 50 MILLIGRAM(S): 200 TABLET, FILM COATED ORAL at 22:49

## 2021-10-26 RX ADMIN — Medication 1: at 12:01

## 2021-10-26 RX ADMIN — Medication 5 MILLIGRAM(S): at 06:04

## 2021-10-26 RX ADMIN — OXYCODONE HYDROCHLORIDE 5 MILLIGRAM(S): 5 TABLET ORAL at 10:43

## 2021-10-26 RX ADMIN — OXYCODONE HYDROCHLORIDE 10 MILLIGRAM(S): 5 TABLET ORAL at 17:09

## 2021-10-26 RX ADMIN — CHLORHEXIDINE GLUCONATE 15 MILLILITER(S): 213 SOLUTION TOPICAL at 22:51

## 2021-10-26 RX ADMIN — Medication 500 MILLIGRAM(S): at 06:05

## 2021-10-26 RX ADMIN — Medication 1 DROP(S): at 12:00

## 2021-10-26 NOTE — PROGRESS NOTE ADULT - SUBJECTIVE AND OBJECTIVE BOX
Patient fatigued, not restless.     REVIEW OF SYSTEMS  Constitutional - No fever,  +fatigue  Neurological - +loss of strength    FUNCTIONAL PROGRESS  10/24 OT  Bathing Training:     · Level of [place]	dependent (less than 25% patients effort)    Upper Body Dressing Training:     · Level of [place]	maximum assist (25% patients effort)    Lower Body Dressing Training:     · Level of [place]ro[place]	dependent (less than 25% patients effort)    Toilet Hygiene Training:     · Level of [place]	dependent (less than 25% patients effort); Pt lets staff know when he needs to use urinal by waving arm.  Bedpan for BM.    Grooming Training:     · Level of [place]	dependent (less than 25% patients effort)    Eating/Self-Feeding Training:     · Level of [place]	dependent (less than 25% patients effort); +PEG due to facial surgeries, jaw wired shut      VITALS  T(C): 36.8 (10-26-21 @ 06:28), Max: 37.5 (10-25-21 @ 14:49)  HR: 63 (10-26-21 @ 08:00) (63 - 75)  BP: 111/62 (10-26-21 @ 08:00) (111/62 - 122/66)  RR: 21 (10-26-21 @ 08:00) (12 - 21)  SpO2: 97% (10-26-21 @ 08:00) (97% - 100%)  Wt(kg): --    MEDICATIONS   acetaminophen     Tablet .. 975 milliGRAM(s) every 8 hours PRN  ALBUTerol    90 MICROgram(s) HFA Inhaler 2 Puff(s) every 6 hours PRN  artificial  tears Solution 1 Drop(s) daily  cephalexin 500 milliGRAM(s) every 12 hours  chlorhexidine 0.12% Liquid 15 milliLiter(s) <User Schedule>  diVALproex  milliGRAM(s) two times a day  enalapril 5 milliGRAM(s) daily  enoxaparin Injectable 40 milliGRAM(s) daily  influenza   Vaccine 0.5 milliLiter(s) once  insulin lispro (ADMELOG) corrective regimen sliding scale   three times a day before meals  melatonin 5 milliGRAM(s) at bedtime  methylPREDNISolone     methylPREDNISolone 4 milliGRAM(s) before breakfast  methylPREDNISolone 4 milliGRAM(s) after lunch  methylPREDNISolone 4 milliGRAM(s) after dinner  methylPREDNISolone 8 milliGRAM(s) at bedtime  oxyCODONE    Solution 5 milliGRAM(s) every 6 hours PRN  oxyCODONE    Solution 10 milliGRAM(s) every 6 hours PRN  QUEtiapine 50 milliGRAM(s) at bedtime      RECENT LABS/IMAGING            10.4   8.58  )-----------( 284      ( 24 Oct 2021 06:55 )             30.2     10-24    134<L>  |  102  |  18.8  ----------------------------<  151<H>  4.4   |  20.0<L>  |  0.52    Ca    7.9<L>      24 Oct 2021 06:55  Phos  2.4     10-24  Mg     2.2     10-24    CT Head/C-spine/Maxillofacial 10/17:  There is an acute fracture involving the frontal bone which traverse the frontal sinus as detailed with subarachnoid hemorrhage in the basal cisterns and acute subdural hemorrhages as described. There is no midline shift or herniation. There is an acute right LeFort fracture II. Complex nasoethmoidal orbital fractures as described in details above. The skull base fracture involving the left sphenoid bone traverses through the anterior wall of the carotid canal and left clinoid processes, therefore CT angiography of the brain is recommended to exclude vascular dissection. Acute fracture of the left fovea ethmoidalis ethmoidalis for which CSF leak cannot be entirely excluded. No cervical spine fracture.    CT C/A/P 10/17: Stranding of the mediastinal fat adjacent to the aorta, esophagus, and azygos vein concerning for mediastinal hematoma. No evidence of active extravasation or acute aortic injury. Findings may reflect venous injury. Small patchy airspace opacity at the left lung base may reflect small contusion. Comminuted fracture of the right acetabulum.    Xray Knee 3 Views, Bilateral 10/17: No acute bony pathology.    Xray Femur 2 Views, Bilat 10/17: No acute bony pathology.    Xray Wrist 3 Views, Bilateral 10/17: Three views of the right wrist and three views of the left wrist show comminuted fracture of the distal left radius with comminuted buckle fracture and dorsal angulation of the distal right radius.    Xray Forearm, Bilateral 10/17:  Two views of the right forearm and two views of the left forearm show comminuted fractureof the distal left radius and comminuted buckle fracture of the distal right radius.    Xray Tibia + Fibula 2 Views, Bilateral 10/17:  Two views of the right leg and two views of the left leg show no evidence of fracture nor destructive change. The joint spaces are maintained.    CTA Head 10/17: There is no large vessel occlusion or aneurysm involving major proximal intracranial arteries.    CTA NECK 10/17: Hypodensities in the left internal carotid artery in the petrous portion of the left temporal bone, for which multifocal dissection is difficult to exclude secondary to adjacent streak artifact. Further correlation with MRI of neck is suggested including T1 fat saturated images. There is no stenosis involving major neck arteries.    Xray Wrist 3 Views, Right 10/17: Following reduction fracture segments are in anatomical alignment.  Fracture is stabilized by applied cast.    Xray Hand 3 Views, Left 10/17: There is a comminuted intra-articular fracture distal LEFT radial metaphysis and epiphysis. Carpal bones, metacarpal bones intact. There is a dorsal dislocation of the third DIP joint. Dorsal dislocation of fourth interphalangeal joint. Remaining osseous structures intact.    Xray Hand Post Reduction, Left 10/17: Following reduction distal radius fracture segments are in anatomical alignment. Reduction of dislocated third DIP joint and fourth interphalangeal joint. Cast applied. Reduction of fracture segments and dislocated phalanges as described.    Xray Wrist Post Reduction AP/Lat, Left 10/17: Following reduction fracture segments are in anatomical alignment.  Fracture is stabilized by applied cast.    CT Head 10/17: Slightly increased frontal parenchymal hemorrhage just above the cribriform plate compared with 11:46 AM. Otherwise no change in traumatic subarachnoid and subdural hemorrhage.    Xray Shoulder 2 Views, Left 10/17: No acute radiographic osseous pathology.    CT Knee No Cont, Right 10/18:  1. No acute fracture or dislocation.  2.  Mild osteoarthritis of the patellofemoral compartment of the joint.  3.  Small knee joint effusion.  4.  Subcutaneous edema along the anteromedial aspect of the joint which may be secondary to direct contusion.    Xray Elbow AP + Lateral, Left 10/18: Suggestion of slight CHF. Blunting of the left elbow.    Brain MRI 10/19: 1.  Bilateral frontal, parietal occipital, and parafalcine subdural hemorrhages. Subarachnoid hemorrhage along the bilateral cerebral convexities and in the basal cisterns. Bilateral frontal hemorrhagic contusions, right greater than left. Bilateral occipital hemorrhagic contusions. No significant midline shift, hydrocephalus, or effacement of basal cisterns. 2.  Multiple skull, skull base and facial fractures, as seen on prior CTs, raising the possibility of CSF leak.    Brain MRA 10/19: No large vessel occlusion or aneurysm.    Neck MRA 10/19: No hemodynamically significant stenosis of the bilateral cervical ICAs by NASCET criteria.    CT Wrist No Cont, Left 10/19: 1.  Comminuted mildly depressed fracture the distal radius. 2.  Avulsion fracture of the triquetrum suspect acute on chronic. 3.  Moderate soft tissue swelling about the wrist. 4.  Cystic changes in the lunate and proximal scaphoid likely degenerative.    CT Wrist No Cont, Right 10/19: 1.  Comminuted and impacted fracture of the distal radius with volar angulation the fracture.  2.  Small avulsion fracture at the dorsal distal triquetrum. 3.  Small avulsion fracture at the ulnar styloid process. 4.  Soft tissue swelling.    Xray Wrist 3 Views, Bilateral 10/21: Anatomical alignment post ORIF of bilateral distal radial fractures    Xray Pelvis AP only 10/21: Two views of the pelvis demonstrate a mildly displaced right acetabular fracture. Mild degenerative change otherwise evident.      ----------------------------------------------------------------------------------------  PHYSICAL EXAM  Constitutional - NAD, Comfortable  HEENT - Multiple facial injuries/swelling   Neck - Closed trach system  Extremities - No edema  Neurologic Exam -                    Cognitive - Awake/Alert     Communication - Responds yes/no     Motor - No focal deficits noted (limited by pain/BUE casts)  Psychiatric - Calm, Intermittently restless - less today  ----------------------------------------------------------------------------------------  ASSESSMENT/PLAN  61yMale with functional deficits after falling off a ladder and suffering multiple trauma  Bilateral Radial Fractures s/p ORIF - NWB  Right Acetabular Fracture - TTWB  Facial Fractures s/p ORIF - Medrol, Keflex  HTN - Vasotec  Restlessness - Depakote 250mg BID (10/25)  Sleep - Melatonin, Seroquel  Dysphagia - PEG/TF. Speech eval pending  Pain - Recommend Tylenol ATC, Oxycodone  DVT PPX - SCDs, Lovenox  Rehab - Given patient's limited functional ability due to his WB status x3 extremities, continue to recommend [facility], patient DOES NOT meet acute inpatient rehabilitation criteria. Patient needs a more prolonged stay to achieve transition to community living and would not be able to tolerate a comprehensive/intense rehab program of 3hours/day.     Will continue to follow and current recommendations may change if functional progress changes.    Recommend ongoing mobilization by staff to maintain cardiopulmonary function and prevention of secondary complications related to debility. Discussed with rehab team.

## 2021-10-26 NOTE — PROGRESS NOTE ADULT - ASSESSMENT
61yM, BIBA as trauma B s/p fall from ladder (approx 25 feet), found to have multiple traumatic injuries including scattered SAH, L SDH, multiple facial fractures, including maxillary sinus and nasal bone fractures, Lefort II fracture, B/L distal radial fractures, possible L ICA dissection on CTA, and R posterior acetabular fracture.    Plan:  CNS: neuro checks  Resp: pulmonary toilet, team will consider decannulating within the next few days   GI/Nutrition: s/p peg tube for dysphagia, continue tube feeds and put to goal   Endo: on medrol dose juan luis FS q6, ISS if needed  Skin: Repositioning for DTI prevention while in bed  Heme/DVT Prophylaxis: SCDs, lovenox  MSK: NWB to bilateral upper and lower ext  Dispo: [facility]

## 2021-10-26 NOTE — PROGRESS NOTE ADULT - ATTENDING COMMENTS
Seen, examined, discussed with team on AM rounds.   Laying comfortably in bed. Alert; appears oriented, and following commands appropriately.   Indicates pain well-controlled; denies difficulty with ventilation.     Assessment: Recovering appropriately.   --PT/OT, SW for dispo planning.   --Speech and swallow eval for PM valve.   --R Toe Touch Weight Bearing.

## 2021-10-26 NOTE — PROGRESS NOTE ADULT - SUBJECTIVE AND OBJECTIVE BOX
SUBJECTIVE/24 hour events: 61yM, BIBA as trauma B s/p fall from ladder (approx 25 feet), found to have multiple traumatic injuries including scattered SAH, L SDH, multiple facial fractures, including maxillary sinus and nasal bone fractures, Lefort III fracture, B/L distal radial fractures, possible L ICA dissection on CTA, and R posterior acetabular fracture. Patient trached and peg on 10/20, 10/21 ORIF distal radius fracture. Patient with no acute events overnight, tolerating tube feeds, pain controlled, finger sticks controlled, on cephalexin. Patient evaluated by PM&R and does not meet criteria for acute rehab, dispo to [facility]         Vital Signs Last 24 Hrs  T(C): 37.1 (25 Oct 2021 21:00), Max: 37.5 (25 Oct 2021 07:38)  T(F): 98.7 (25 Oct 2021 21:00), Max: 99.5 (25 Oct 2021 07:38)  HR: 71 (25 Oct 2021 20:00) (69 - 75)  BP: 122/66 (25 Oct 2021 20:00) (112/64 - 122/66)  BP(mean): 84 (25 Oct 2021 20:00) (83 - 84)  RR: 12 (25 Oct 2021 20:00) (12 - 21)  SpO2: 100% (25 Oct 2021 20:00) (96% - 100%)  Drug Dosing Weight  Height (cm): 160 (19 Oct 2021 16:22)  Weight (kg): 67 (19 Oct 2021 16:22)  BMI (kg/m2): 26.2 (19 Oct 2021 16:22)  BSA (m2): 1.7 (19 Oct 2021 16:22)  I&O's Detail    24 Oct 2021 07:01  -  25 Oct 2021 07:00  --------------------------------------------------------  IN:    IV PiggyBack: 100 mL    Jevity 1.5: 1080 mL  Total IN: 1180 mL    OUT:    Voided (mL): 1250 mL  Total OUT: 1250 mL    Total NET: -70 mL      25 Oct 2021 07:01  -  26 Oct 2021 01:39  --------------------------------------------------------  IN:    Jevity 1.5: 495 mL  Total IN: 495 mL    OUT:    Voided (mL): 350 mL  Total OUT: 350 mL    Total NET: 145 mL        Allergies    No Known Allergies    Intolerances                              10.4   8.58  )-----------( 284      ( 24 Oct 2021 06:55 )             30.2   10-24    134<L>  |  102  |  18.8  ----------------------------<  151<H>  4.4   |  20.0<L>  |  0.52    Ca    7.9<L>      24 Oct 2021 06:55  Phos  2.4     10-24  Mg     2.2     10-24        ROS:    PHYSICAL EXAM:  GEN: resting comfortably   RESP: respirations are unlabored, no accessory muscle use, no conversational dyspnea tracheostomy is not obstructed  CVS: RRR  GI: Abdomen soft, non-tender, non-distended, no rebound tenderness / guarding, peg tube well seated   extremities,  upper extremities  immobilization        MEDICATIONS  (STANDING):  artificial  tears Solution 1 Drop(s) Both EYES daily  cephalexin 500 milliGRAM(s) Oral every 12 hours  chlorhexidine 0.12% Liquid 15 milliLiter(s) Oral Mucosa <User Schedule>  diVALproex  milliGRAM(s) Oral two times a day  enalapril 5 milliGRAM(s) Oral daily  enoxaparin Injectable 40 milliGRAM(s) SubCutaneous daily  influenza   Vaccine 0.5 milliLiter(s) IntraMuscular once  insulin lispro (ADMELOG) corrective regimen sliding scale   SubCutaneous three times a day before meals  melatonin 5 milliGRAM(s) Oral at bedtime  methylPREDNISolone   Oral   methylPREDNISolone 4 milliGRAM(s) Oral before breakfast  methylPREDNISolone 4 milliGRAM(s) Oral after lunch  methylPREDNISolone 4 milliGRAM(s) Oral after dinner  methylPREDNISolone 8 milliGRAM(s) Oral at bedtime  QUEtiapine 50 milliGRAM(s) Oral at bedtime    MEDICATIONS  (PRN):  acetaminophen     Tablet .. 975 milliGRAM(s) Oral every 8 hours PRN Mild Pain (1 - 3)  ALBUTerol    90 MICROgram(s) HFA Inhaler 2 Puff(s) Inhalation every 6 hours PRN Wheezing  oxyCODONE    Solution 5 milliGRAM(s) Oral every 6 hours PRN Moderate Pain (4 - 6)  oxyCODONE    Solution 10 milliGRAM(s) Oral every 6 hours PRN Severe Pain (7 - 10)      RADIOLOGY STUDIES:    CULTURES:

## 2021-10-27 LAB
GLUCOSE BLDC GLUCOMTR-MCNC: 164 MG/DL — HIGH (ref 70–99)
GLUCOSE BLDC GLUCOMTR-MCNC: 170 MG/DL — HIGH (ref 70–99)
GLUCOSE BLDC GLUCOMTR-MCNC: 183 MG/DL — HIGH (ref 70–99)

## 2021-10-27 PROCEDURE — 99233 SBSQ HOSP IP/OBS HIGH 50: CPT

## 2021-10-27 RX ORDER — OXYCODONE HYDROCHLORIDE 5 MG/1
10 TABLET ORAL EVERY 4 HOURS
Refills: 0 | Status: DISCONTINUED | OUTPATIENT
Start: 2021-10-27 | End: 2021-11-03

## 2021-10-27 RX ORDER — HYDROMORPHONE HYDROCHLORIDE 2 MG/ML
0.5 INJECTION INTRAMUSCULAR; INTRAVENOUS; SUBCUTANEOUS ONCE
Refills: 0 | Status: DISCONTINUED | OUTPATIENT
Start: 2021-10-27 | End: 2021-10-27

## 2021-10-27 RX ORDER — OXYCODONE HYDROCHLORIDE 5 MG/1
5 TABLET ORAL EVERY 4 HOURS
Refills: 0 | Status: DISCONTINUED | OUTPATIENT
Start: 2021-10-27 | End: 2021-10-27

## 2021-10-27 RX ORDER — OXYCODONE HYDROCHLORIDE 5 MG/1
5 TABLET ORAL ONCE
Refills: 0 | Status: DISCONTINUED | OUTPATIENT
Start: 2021-10-27 | End: 2021-10-27

## 2021-10-27 RX ORDER — ACETAMINOPHEN 500 MG
1000 TABLET ORAL ONCE
Refills: 0 | Status: COMPLETED | OUTPATIENT
Start: 2021-10-27 | End: 2021-10-27

## 2021-10-27 RX ADMIN — OXYCODONE HYDROCHLORIDE 10 MILLIGRAM(S): 5 TABLET ORAL at 00:00

## 2021-10-27 RX ADMIN — Medication 1: at 14:18

## 2021-10-27 RX ADMIN — OXYCODONE HYDROCHLORIDE 5 MILLIGRAM(S): 5 TABLET ORAL at 08:48

## 2021-10-27 RX ADMIN — Medication 5 MILLIGRAM(S): at 22:14

## 2021-10-27 RX ADMIN — Medication 500 MILLIGRAM(S): at 22:14

## 2021-10-27 RX ADMIN — Medication 5 MILLIGRAM(S): at 06:28

## 2021-10-27 RX ADMIN — OXYCODONE HYDROCHLORIDE 10 MILLIGRAM(S): 5 TABLET ORAL at 22:14

## 2021-10-27 RX ADMIN — Medication 500 MILLIGRAM(S): at 06:29

## 2021-10-27 RX ADMIN — Medication 4 MILLIGRAM(S): at 22:14

## 2021-10-27 RX ADMIN — HYDROMORPHONE HYDROCHLORIDE 0.5 MILLIGRAM(S): 2 INJECTION INTRAMUSCULAR; INTRAVENOUS; SUBCUTANEOUS at 14:25

## 2021-10-27 RX ADMIN — OXYCODONE HYDROCHLORIDE 10 MILLIGRAM(S): 5 TABLET ORAL at 16:01

## 2021-10-27 RX ADMIN — CHLORHEXIDINE GLUCONATE 15 MILLILITER(S): 213 SOLUTION TOPICAL at 14:21

## 2021-10-27 RX ADMIN — Medication 4 MILLIGRAM(S): at 08:48

## 2021-10-27 RX ADMIN — ENOXAPARIN SODIUM 40 MILLIGRAM(S): 100 INJECTION SUBCUTANEOUS at 14:14

## 2021-10-27 RX ADMIN — Medication 4 MILLIGRAM(S): at 16:00

## 2021-10-27 RX ADMIN — Medication 1 DROP(S): at 14:20

## 2021-10-27 RX ADMIN — DIVALPROEX SODIUM 250 MILLIGRAM(S): 500 TABLET, DELAYED RELEASE ORAL at 06:29

## 2021-10-27 RX ADMIN — Medication 400 MILLIGRAM(S): at 21:08

## 2021-10-27 RX ADMIN — QUETIAPINE FUMARATE 50 MILLIGRAM(S): 200 TABLET, FILM COATED ORAL at 22:15

## 2021-10-27 RX ADMIN — OXYCODONE HYDROCHLORIDE 5 MILLIGRAM(S): 5 TABLET ORAL at 20:49

## 2021-10-27 RX ADMIN — Medication 1: at 08:47

## 2021-10-27 RX ADMIN — Medication 1: at 18:24

## 2021-10-27 RX ADMIN — CHLORHEXIDINE GLUCONATE 15 MILLILITER(S): 213 SOLUTION TOPICAL at 06:28

## 2021-10-27 NOTE — PROCEDURE NOTE - NSINFORMCONSENT_GEN_A_CORE
Patient's caregiver who gave verbal and written consent./Benefits, risks, and possible complications of procedure explained to patient/caregiver who verbalized understanding and gave written consent.
Benefits, risks, and possible complications of procedure explained to patient/caregiver who verbalized understanding and gave verbal consent.
Benefits, risks, and possible complications of procedure explained to patient/caregiver who verbalized understanding and gave written consent.
Benefits, risks, and possible complications of procedure explained to patient/caregiver who verbalized understanding and gave verbal consent.

## 2021-10-27 NOTE — PROCEDURE NOTE - NSTRACHPOSTINTU_RESP_A_CORE
Breath sounds bilateral/Breath sounds equal
Appropriate capnography/Breath sounds bilateral/Breath sounds equal/Positive end tidal Co2 noted

## 2021-10-27 NOTE — PROGRESS NOTE ADULT - ASSESSMENT
· Assessment	  61yM, BIBA as trauma B s/p fall from ladder (approx 25 feet), found to have multiple traumatic injuries including scattered SAH, L SDH, multiple facial fractures, including maxillary sinus and nasal bone fractures, Lefort II fracture, B/L distal radial fractures, possible L ICA dissection on CTA, and R posterior acetabular fracture.    Plan:  CNS: neuro checks  Resp: pulmonary toilet, team will consider decannulating within the next few days   GI/Nutrition: s/p peg tube for dysphagia, continue tube feeds and put to goal   Endo: on medrol dose juan luis FS q6, ISS if needed  Skin: Repositioning for DTI prevention while in bed  Heme/DVT Prophylaxis: SCDs, lovenox  MSK: NWB to bilateral upper and lower ext  Dispo: [facility]

## 2021-10-27 NOTE — PROCEDURE NOTE - NSPROCDETAILS_GEN_ALL_CORE
Patient pre-oxygenated. Prior percutaneous trach removed and placement of 6F Shiley uncuffed tracheostomy
patient pre-oxygenated, tube inserted, placement confirmed

## 2021-10-27 NOTE — PROGRESS NOTE ADULT - ATTENDING COMMENTS
Patient seen and examined on am rounds. Tolerating trach collar well, PEG c/d/i, abd soft, NTTP. Will remove trach sutures and switch to fenestrated trach (6 in place), for speech eval and passmuir valve. Dispo pending.

## 2021-10-27 NOTE — PROGRESS NOTE ADULT - SUBJECTIVE AND OBJECTIVE BOX
Subjective:    61yM, BIBA as trauma B s/p fall from ladder (approx 25 feet), found to have multiple traumatic injuries including scattered SAH, L SDH, multiple facial fractures, including maxillary sinus and nasal bone fractures, Lefort III fracture, B/L distal radial fractures, possible L ICA dissection on CTA, and R posterior acetabular fracture. Patient trached and peg on 10/20, 10/21 ORIF distal radius fracture. Patient with no acute events overnight, tolerating tube feeds, pain controlled, finger sticks controlled, on cephalexin. Patient evaluated by PM&R and does not meet criteria for acute rehab, dispo to [facility]     MEDICATIONS  (STANDING):  artificial  tears Solution 1 Drop(s) Both EYES daily  cephalexin 500 milliGRAM(s) Oral every 12 hours  chlorhexidine 0.12% Liquid 15 milliLiter(s) Oral Mucosa <User Schedule>  diVALproex  milliGRAM(s) Oral two times a day  enalapril 5 milliGRAM(s) Oral daily  enoxaparin Injectable 40 milliGRAM(s) SubCutaneous daily  influenza   Vaccine 0.5 milliLiter(s) IntraMuscular once  insulin lispro (ADMELOG) corrective regimen sliding scale   SubCutaneous three times a day before meals  melatonin 5 milliGRAM(s) Oral at bedtime  methylPREDNISolone 4 milliGRAM(s) Oral before breakfast  methylPREDNISolone 4 milliGRAM(s) Oral after lunch  methylPREDNISolone 4 milliGRAM(s) Oral after dinner  methylPREDNISolone 4 milliGRAM(s) Oral at bedtime  methylPREDNISolone   Oral   QUEtiapine 50 milliGRAM(s) Oral at bedtime    MEDICATIONS  (PRN):  acetaminophen     Tablet .. 975 milliGRAM(s) Oral every 8 hours PRN Mild Pain (1 - 3)  ALBUTerol    90 MICROgram(s) HFA Inhaler 2 Puff(s) Inhalation every 6 hours PRN Wheezing  oxyCODONE    Solution 5 milliGRAM(s) Oral every 6 hours PRN Moderate Pain (4 - 6)  oxyCODONE    Solution 10 milliGRAM(s) Oral every 6 hours PRN Severe Pain (7 - 10)      Vital Signs Last 24 Hrs  T(C): 37.3 (26 Oct 2021 15:34), Max: 37.3 (26 Oct 2021 15:34)  T(F): 99.1 (26 Oct 2021 15:34), Max: 99.1 (26 Oct 2021 15:34)  HR: 75 (27 Oct 2021 01:00) (60 - 91)  BP: 111/57 (27 Oct 2021 01:00) (111/57 - 119/64)  BP(mean): 71 (27 Oct 2021 01:00) (70 - 86)  RR: 18 (27 Oct 2021 01:00) (16 - 21)  SpO2: 98% (27 Oct 2021 01:00) (97% - 100%)      10-25  -  10-26  --------------------------------------------------------  IN:    Jevity 1.5: 540 mL  Total IN: 540 mL    OUT:    Voided (mL): 750 mL  Total OUT: 750 mL    Total NET: -210 mL      10-26  -  10-27  --------------------------------------------------------  IN:    Jevity 1.5: 810 mL  Total IN: 810 mL    OUT:    Voided (mL): 750 mL  Total OUT: 750 mL    Total NET: 60 mL          PHYSICAL EXAM:  GEN: resting comfortably   RESP: respirations are unlabored, no accessory muscle use, no conversational dyspnea tracheostomy is not obstructed  CVS: RRR  GI: Abdomen soft, non-tender, non-distended, no rebound tenderness / guarding, peg tube well seated   extremities,  upper extremities  immobilization

## 2021-10-27 NOTE — PROGRESS NOTE ADULT - SUBJECTIVE AND OBJECTIVE BOX
Patient fatigued, calm.  Off 1:1.     REVIEW OF SYSTEMS  Constitutional - No fever,  +fatigue  Neurological - +loss of strength     FUNCTIONAL PROGRESS  10/24 OT  Bathing Training:     · Level of [place]	dependent (less than 25% patients effort)    Upper Body Dressing Training:     · Level of [place]	maximum assist (25% patients effort)    Lower Body Dressing Training:     · Level of [place]	dependent (less than 25% patients effort)    Toilet Hygiene Training:     · Level of [place]	dependent (less than 25% patients effort); Pt lets staff know when he needs to use urinal by waving arm.  Bedpan for BM.    Grooming Training:     · Level of [place]	dependent (less than 25% patients effort)    Eating/Self-Feeding Training:     · Level of [place]	dependent (less than 25% patients effort); +PEG due to facial surgeries, jaw wired shut      VITALS  T(C): 37.3 (10-26-21 @ 15:34), Max: 37.3 (10-26-21 @ 15:34)  HR: 72 (10-27-21 @ 04:00) (60 - 91)  BP: 119/56 (10-27-21 @ 04:00) (111/57 - 119/56)  RR: 18 (10-27-21 @ 04:00) (16 - 21)  SpO2: 100% (10-27-21 @ 04:00) (98% - 100%)  Wt(kg): --    MEDICATIONS   acetaminophen     Tablet .. 975 milliGRAM(s) every 8 hours PRN  ALBUTerol    90 MICROgram(s) HFA Inhaler 2 Puff(s) every 6 hours PRN  artificial  tears Solution 1 Drop(s) daily  cephalexin 500 milliGRAM(s) every 12 hours  chlorhexidine 0.12% Liquid 15 milliLiter(s) <User Schedule>  diVALproex  milliGRAM(s) two times a day  enalapril 5 milliGRAM(s) daily  enoxaparin Injectable 40 milliGRAM(s) daily  influenza   Vaccine 0.5 milliLiter(s) once  insulin lispro (ADMELOG) corrective regimen sliding scale   three times a day before meals  melatonin 5 milliGRAM(s) at bedtime  methylPREDNISolone     methylPREDNISolone 4 milliGRAM(s) before breakfast  methylPREDNISolone 4 milliGRAM(s) after lunch  methylPREDNISolone 4 milliGRAM(s) after dinner  methylPREDNISolone 4 milliGRAM(s) at bedtime  oxyCODONE    Solution 5 milliGRAM(s) every 6 hours PRN  oxyCODONE    Solution 10 milliGRAM(s) every 6 hours PRN  QUEtiapine 50 milliGRAM(s) at bedtime      RECENT LABS/IMAGING                             10.4   8.58  )-----------( 284      ( 24 Oct 2021 06:55 )             30.2     10-24    134<L>  |  102  |  18.8  ----------------------------<  151<H>  4.4   |  20.0<L>  |  0.52    Ca    7.9<L>      24 Oct 2021 06:55  Phos  2.4     10-24  Mg     2.2     10-24    CT Head/C-spine/Maxillofacial 10/17:  There is an acute fracture involving the frontal bone which traverse the frontal sinus as detailed with subarachnoid hemorrhage in the basal cisterns and acute subdural hemorrhages as described. There is no midline shift or herniation. There is an acute right LeFort fracture II. Complex nasoethmoidal orbital fractures as described in details above. The skull base fracture involving the left sphenoid bone traverses through the anterior wall of the carotid canal and left clinoid processes, therefore CT angiography of the brain is recommended to exclude vascular dissection. Acute fracture of the left fovea ethmoidalis ethmoidalis for which CSF leak cannot be entirely excluded. No cervical spine fracture.    CT C/A/P 10/17: Stranding of the mediastinal fat adjacent to the aorta, esophagus, and azygos vein concerning for mediastinal hematoma. No evidence of active extravasation or acute aortic injury. Findings may reflect venous injury. Small patchy airspace opacity at the left lung base may reflect small contusion. Comminuted fracture of the right acetabulum.    Xray Knee 3 Views, Bilateral 10/17: No acute bony pathology.    Xray Femur 2 Views, Bilat 10/17: No acute bony pathology.    Xray Wrist 3 Views, Bilateral 10/17: Three views of the right wrist and three views of the left wrist show comminuted fracture of the distal left radius with comminuted buckle fracture and dorsal angulation of the distal right radius.    Xray Forearm, Bilateral 10/17:  Two views of the right forearm and two views of the left forearm show comminuted fractureof the distal left radius and comminuted buckle fracture of the distal right radius.    Xray Tibia + Fibula 2 Views, Bilateral 10/17:  Two views of the right leg and two views of the left leg show no evidence of fracture nor destructive change. The joint spaces are maintained.    CTA Head 10/17: There is no large vessel occlusion or aneurysm involving major proximal intracranial arteries.    CTA NECK 10/17: Hypodensities in the left internal carotid artery in the petrous portion of the left temporal bone, for which multifocal dissection is difficult to exclude secondary to adjacent streak artifact. Further correlation with MRI of neck is suggested including T1 fat saturated images. There is no stenosis involving major neck arteries.    Xray Wrist 3 Views, Right 10/17: Following reduction fracture segments are in anatomical alignment.  Fracture is stabilized by applied cast.    Xray Hand 3 Views, Left 10/17: There is a comminuted intra-articular fracture distal LEFT radial metaphysis and epiphysis. Carpal bones, metacarpal bones intact. There is a dorsal dislocation of the third DIP joint. Dorsal dislocation of fourth interphalangeal joint. Remaining osseous structures intact.    Xray Hand Post Reduction, Left 10/17: Following reduction distal radius fracture segments are in anatomical alignment. Reduction of dislocated third DIP joint and fourth interphalangeal joint. Cast applied. Reduction of fracture segments and dislocated phalanges as described.    Xray Wrist Post Reduction AP/Lat, Left 10/17: Following reduction fracture segments are in anatomical alignment.  Fracture is stabilized by applied cast.    CT Head 10/17: Slightly increased frontal parenchymal hemorrhage just above the cribriform plate compared with 11:46 AM. Otherwise no change in traumatic subarachnoid and subdural hemorrhage.    Xray Shoulder 2 Views, Left 10/17: No acute radiographic osseous pathology.    CT Knee No Cont, Right 10/18:  1. No acute fracture or dislocation.  2.  Mild osteoarthritis of the patellofemoral compartment of the joint.  3.  Small knee joint effusion.  4.  Subcutaneous edema along the anteromedial aspect of the joint which may be secondary to direct contusion.    Xray Elbow AP + Lateral, Left 10/18: Suggestion of slight CHF. Blunting of the left elbow.    Brain MRI 10/19: 1.  Bilateral frontal, parietal occipital, and parafalcine subdural hemorrhages. Subarachnoid hemorrhage along the bilateral cerebral convexities and in the basal cisterns. Bilateral frontal hemorrhagic contusions, right greater than left. Bilateral occipital hemorrhagic contusions. No significant midline shift, hydrocephalus, or effacement of basal cisterns. 2.  Multiple skull, skull base and facial fractures, as seen on prior CTs, raising the possibility of CSF leak.    Brain MRA 10/19: No large vessel occlusion or aneurysm.    Neck MRA 10/19: No hemodynamically significant stenosis of the bilateral cervical ICAs by NASCET criteria.    CT Wrist No Cont, Left 10/19: 1.  Comminuted mildly depressed fracture the distal radius. 2.  Avulsion fracture of the triquetrum suspect acute on chronic. 3.  Moderate soft tissue swelling about the wrist. 4.  Cystic changes in the lunate and proximal scaphoid likely degenerative.    CT Wrist No Cont, Right 10/19: 1.  Comminuted and impacted fracture of the distal radius with volar angulation the fracture.  2.  Small avulsion fracture at the dorsal distal triquetrum. 3.  Small avulsion fracture at the ulnar styloid process. 4.  Soft tissue swelling.    Xray Wrist 3 Views, Bilateral 10/21: Anatomical alignment post ORIF of bilateral distal radial fractures    Xray Pelvis AP only 10/21: Two views of the pelvis demonstrate a mildly displaced right acetabular fracture. Mild degenerative change otherwise evident.      ----------------------------------------------------------------------------------------  PHYSICAL EXAM  Constitutional - NAD, Comfortable  HEENT - Multiple facial injuries/swelling   Neck - Closed trach system  Extremities - No edema  Neurologic Exam -                    Cognitive - Awake/Alert     Communication - Responds yes/no     Motor - No focal deficits noted (limited by pain/BUE casts)  Psychiatric - Calm, Fatigued  ----------------------------------------------------------------------------------------  ASSESSMENT/PLAN  61yMale with functional deficits after falling off a ladder and suffering multiple trauma  Bilateral Radial Fractures s/p ORIF - NWB  Right Acetabular Fracture - TTWB  Facial Fractures s/p ORIF - Medrol, Keflex  HTN - Vasotec  Restlessness - Depakote 250mg BID (10/25)  Sleep - Melatonin, Seroquel  Dysphagia - PEG/TF. Speech eval pending  Pain - Recommend Tylenol ATC, Oxycodone  DVT PPX - SCDs, Lovenox  Rehab - Patient is more calm, off 1:1. Appears pain is also more controlled. Patient has limited functional ability related to his WB status x3 extremities. Therefore, continue to recommend [facility], patient DOES NOT meet acute inpatient rehabilitation criteria. Patient needs a more prolonged stay to achieve transition to community living and would not be able to tolerate a comprehensive/intense rehab program of 3hours/day.     Recommend ongoing mobilization by staff to maintain cardiopulmonary function and prevention of secondary complications related to debility. Discussed with rehab team.

## 2021-10-28 LAB
GLUCOSE BLDC GLUCOMTR-MCNC: 136 MG/DL — HIGH (ref 70–99)
GLUCOSE BLDC GLUCOMTR-MCNC: 138 MG/DL — HIGH (ref 70–99)
GLUCOSE BLDC GLUCOMTR-MCNC: 172 MG/DL — HIGH (ref 70–99)
GLUCOSE BLDC GLUCOMTR-MCNC: 175 MG/DL — HIGH (ref 70–99)
GLUCOSE BLDC GLUCOMTR-MCNC: 192 MG/DL — HIGH (ref 70–99)

## 2021-10-28 PROCEDURE — 99232 SBSQ HOSP IP/OBS MODERATE 35: CPT

## 2021-10-28 PROCEDURE — 99233 SBSQ HOSP IP/OBS HIGH 50: CPT | Mod: GC

## 2021-10-28 RX ORDER — VALPROIC ACID (AS SODIUM SALT) 250 MG/5ML
250 SOLUTION, ORAL ORAL
Refills: 0 | Status: DISCONTINUED | OUTPATIENT
Start: 2021-10-28 | End: 2021-10-29

## 2021-10-28 RX ADMIN — Medication 500 MILLIGRAM(S): at 06:37

## 2021-10-28 RX ADMIN — QUETIAPINE FUMARATE 50 MILLIGRAM(S): 200 TABLET, FILM COATED ORAL at 21:46

## 2021-10-28 RX ADMIN — Medication 4 MILLIGRAM(S): at 21:46

## 2021-10-28 RX ADMIN — Medication 500 MILLIGRAM(S): at 17:16

## 2021-10-28 RX ADMIN — Medication 4 MILLIGRAM(S): at 12:02

## 2021-10-28 RX ADMIN — Medication 250 MILLIGRAM(S): at 17:18

## 2021-10-28 RX ADMIN — CHLORHEXIDINE GLUCONATE 15 MILLILITER(S): 213 SOLUTION TOPICAL at 12:01

## 2021-10-28 RX ADMIN — Medication 4 MILLIGRAM(S): at 10:38

## 2021-10-28 RX ADMIN — CHLORHEXIDINE GLUCONATE 15 MILLILITER(S): 213 SOLUTION TOPICAL at 17:20

## 2021-10-28 RX ADMIN — OXYCODONE HYDROCHLORIDE 10 MILLIGRAM(S): 5 TABLET ORAL at 22:16

## 2021-10-28 RX ADMIN — OXYCODONE HYDROCHLORIDE 10 MILLIGRAM(S): 5 TABLET ORAL at 04:29

## 2021-10-28 RX ADMIN — CHLORHEXIDINE GLUCONATE 15 MILLILITER(S): 213 SOLUTION TOPICAL at 06:36

## 2021-10-28 RX ADMIN — Medication 5 MILLIGRAM(S): at 21:46

## 2021-10-28 RX ADMIN — OXYCODONE HYDROCHLORIDE 10 MILLIGRAM(S): 5 TABLET ORAL at 21:46

## 2021-10-28 RX ADMIN — OXYCODONE HYDROCHLORIDE 10 MILLIGRAM(S): 5 TABLET ORAL at 17:30

## 2021-10-28 RX ADMIN — ENOXAPARIN SODIUM 40 MILLIGRAM(S): 100 INJECTION SUBCUTANEOUS at 12:01

## 2021-10-28 RX ADMIN — Medication 1: at 17:17

## 2021-10-28 RX ADMIN — OXYCODONE HYDROCHLORIDE 10 MILLIGRAM(S): 5 TABLET ORAL at 04:04

## 2021-10-28 RX ADMIN — CHLORHEXIDINE GLUCONATE 15 MILLILITER(S): 213 SOLUTION TOPICAL at 00:32

## 2021-10-28 RX ADMIN — OXYCODONE HYDROCHLORIDE 10 MILLIGRAM(S): 5 TABLET ORAL at 13:59

## 2021-10-28 RX ADMIN — Medication 1 DROP(S): at 17:14

## 2021-10-28 RX ADMIN — Medication 1: at 17:28

## 2021-10-28 RX ADMIN — Medication 5 MILLIGRAM(S): at 06:37

## 2021-10-28 RX ADMIN — Medication 250 MILLIGRAM(S): at 06:37

## 2021-10-28 NOTE — PROGRESS NOTE ADULT - SUBJECTIVE AND OBJECTIVE BOX
Patient seen this afternoon. Complains of persistent headaches. They are not worsening per patient.    REVIEW OF SYSTEMS  + headaches, + loss of strength, + joint pain      FUNCTIONAL PROGRESS  10/24 OT  Bathing Training:     · Level of [place]	dependent (less than 25% patients effort)    Upper Body Dressing Training:     · Level of [place]	maximum assist (25% patients effort)    Lower Body Dressing Training:     · Level of [place]	dependent (less than 25% patients effort)    Toilet Hygiene Training:     · Level of [place]	dependent (less than 25% patients effort); Pt lets staff know when he needs to use urinal by waving arm.  Bedpan for BM.    Grooming Training:     · Level of [place]	dependent (less than 25% patients effort)    Eating/Self-Feeding Training:     · Level of [place]	dependent (less than 25% patients effort); +PEG due to facial surgeries, jaw wired shut      VITALS  T(C): 37.6 (10-28-21 @ 08:05), Max: 37.7 (10-27-21 @ 20:34)  HR: 73 (10-28-21 @ 08:05) (63 - 75)  BP: 124/60 (10-28-21 @ 08:05) (113/60 - 138/65)  RR: 18 (10-28-21 @ 08:05) (17 - 18)  SpO2: 100% (10-28-21 @ 08:05) (96% - 100%)  Wt(kg): --    MEDICATIONS   ALBUTerol    90 MICROgram(s) HFA Inhaler 2 Puff(s) every 6 hours PRN  artificial  tears Solution 1 Drop(s) daily  cephalexin 500 milliGRAM(s) every 12 hours  chlorhexidine 0.12% Liquid 15 milliLiter(s) <User Schedule>  enalapril 5 milliGRAM(s) daily  enoxaparin Injectable 40 milliGRAM(s) daily  influenza   Vaccine 0.5 milliLiter(s) once  insulin lispro (ADMELOG) corrective regimen sliding scale   three times a day before meals  melatonin 5 milliGRAM(s) at bedtime  methylPREDNISolone     methylPREDNISolone 4 milliGRAM(s) before breakfast  methylPREDNISolone 4 milliGRAM(s) after dinner  methylPREDNISolone 4 milliGRAM(s) at bedtime  oxyCODONE    Solution 5 milliGRAM(s) every 4 hours PRN  oxyCODONE    Solution 10 milliGRAM(s) every 4 hours PRN  QUEtiapine 50 milliGRAM(s) at bedtime  valproic  acid Syrup 250 milliGRAM(s) two times a day      RECENT LABS/IMAGING           10.4   8.58  )-----------( 284      ( 24 Oct 2021 06:55 )             30.2     10-24    134<L>  |  102  |  18.8  ----------------------------<  151<H>  4.4   |  20.0<L>  |  0.52    Ca    7.9<L>      24 Oct 2021 06:55  Phos  2.4     10-24  Mg     2.2     10-24    CT Head/C-spine/Maxillofacial 10/17:  There is an acute fracture involving the frontal bone which traverse the frontal sinus as detailed with subarachnoid hemorrhage in the basal cisterns and acute subdural hemorrhages as described. There is no midline shift or herniation. There is an acute right LeFort fracture II. Complex nasoethmoidal orbital fractures as described in details above. The skull base fracture involving the left sphenoid bone traverses through the anterior wall of the carotid canal and left clinoid processes, therefore CT angiography of the brain is recommended to exclude vascular dissection. Acute fracture of the left fovea ethmoidalis ethmoidalis for which CSF leak cannot be entirely excluded. No cervical spine fracture.    CT C/A/P 10/17: Stranding of the mediastinal fat adjacent to the aorta, esophagus, and azygos vein concerning for mediastinal hematoma. No evidence of active extravasation or acute aortic injury. Findings may reflect venous injury. Small patchy airspace opacity at the left lung base may reflect small contusion. Comminuted fracture of the right acetabulum.    Xray Knee 3 Views, Bilateral 10/17: No acute bony pathology.    Xray Femur 2 Views, Bilat 10/17: No acute bony pathology.    Xray Wrist 3 Views, Bilateral 10/17: Three views of the right wrist and three views of the left wrist show comminuted fracture of the distal left radius with comminuted buckle fracture and dorsal angulation of the distal right radius.    Xray Forearm, Bilateral 10/17:  Two views of the right forearm and two views of the left forearm show comminuted fractureof the distal left radius and comminuted buckle fracture of the distal right radius.    Xray Tibia + Fibula 2 Views, Bilateral 10/17:  Two views of the right leg and two views of the left leg show no evidence of fracture nor destructive change. The joint spaces are maintained.    CTA Head 10/17: There is no large vessel occlusion or aneurysm involving major proximal intracranial arteries.    CTA NECK 10/17: Hypodensities in the left internal carotid artery in the petrous portion of the left temporal bone, for which multifocal dissection is difficult to exclude secondary to adjacent streak artifact. Further correlation with MRI of neck is suggested including T1 fat saturated images. There is no stenosis involving major neck arteries.    Xray Wrist 3 Views, Right 10/17: Following reduction fracture segments are in anatomical alignment.  Fracture is stabilized by applied cast.    Xray Hand 3 Views, Left 10/17: There is a comminuted intra-articular fracture distal LEFT radial metaphysis and epiphysis. Carpal bones, metacarpal bones intact. There is a dorsal dislocation of the third DIP joint. Dorsal dislocation of fourth interphalangeal joint. Remaining osseous structures intact.    Xray Hand Post Reduction, Left 10/17: Following reduction distal radius fracture segments are in anatomical alignment. Reduction of dislocated third DIP joint and fourth interphalangeal joint. Cast applied. Reduction of fracture segments and dislocated phalanges as described.    Xray Wrist Post Reduction AP/Lat, Left 10/17: Following reduction fracture segments are in anatomical alignment.  Fracture is stabilized by applied cast.    CT Head 10/17: Slightly increased frontal parenchymal hemorrhage just above the cribriform plate compared with 11:46 AM. Otherwise no change in traumatic subarachnoid and subdural hemorrhage.    Xray Shoulder 2 Views, Left 10/17: No acute radiographic osseous pathology.    CT Knee No Cont, Right 10/18:  1. No acute fracture or dislocation.  2.  Mild osteoarthritis of the patellofemoral compartment of the joint.  3.  Small knee joint effusion.  4.  Subcutaneous edema along the anteromedial aspect of the joint which may be secondary to direct contusion.    Xray Elbow AP + Lateral, Left 10/18: Suggestion of slight CHF. Blunting of the left elbow.    Brain MRI 10/19: 1.  Bilateral frontal, parietal occipital, and parafalcine subdural hemorrhages. Subarachnoid hemorrhage along the bilateral cerebral convexities and in the basal cisterns. Bilateral frontal hemorrhagic contusions, right greater than left. Bilateral occipital hemorrhagic contusions. No significant midline shift, hydrocephalus, or effacement of basal cisterns. 2.  Multiple skull, skull base and facial fractures, as seen on prior CTs, raising the possibility of CSF leak.    Brain MRA 10/19: No large vessel occlusion or aneurysm.    Neck MRA 10/19: No hemodynamically significant stenosis of the bilateral cervical ICAs by NASCET criteria.    CT Wrist No Cont, Left 10/19: 1.  Comminuted mildly depressed fracture the distal radius. 2.  Avulsion fracture of the triquetrum suspect acute on chronic. 3.  Moderate soft tissue swelling about the wrist. 4.  Cystic changes in the lunate and proximal scaphoid likely degenerative.    CT Wrist No Cont, Right 10/19: 1.  Comminuted and impacted fracture of the distal radius with volar angulation the fracture.  2.  Small avulsion fracture at the dorsal distal triquetrum. 3.  Small avulsion fracture at the ulnar styloid process. 4.  Soft tissue swelling.    Xray Wrist 3 Views, Bilateral 10/21: Anatomical alignment post ORIF of bilateral distal radial fractures    Xray Pelvis AP only 10/21: Two views of the pelvis demonstrate a mildly displaced right acetabular fracture. Mild degenerative change otherwise evident.    ----------------------------------------------------------------------------------------  PHYSICAL EXAM  Constitutional - NAD, Comfortable  HEENT - Multiple facial injuries/swelling   Neck - Closed trach system  Extremities - No edema  Neurologic Exam -                    Cognitive - Awake/Alert     Communication - Responds yes/no     Motor - No focal deficits noted (limited by pain/BUE casts)  Psychiatric - Calm, Fatigued  ----------------------------------------------------------------------------------------  ASSESSMENT/PLAN  61yMale with functional deficits after falling off a ladder and suffering multiple trauma  Bilateral Radial Fractures s/p ORIF - NWB  Right Acetabular Fracture - TTWB  Facial Fractures s/p ORIF - Medrol, Keflex  HTN - Vasotec  Restlessness - Depakote 250mg BID (10/25)  Sleep - Melatonin, Seroquel  Dysphagia - PEG/TF.  Pain - Recommend Tylenol ATC, Oxycodone  DVT PPX - SCDs, Lovenox  Rehab - Continue to recommend [facility], patient DOES NOT meet acute inpatient rehabilitation criteria. Patient needs a more prolonged stay to achieve transition to community living and would not be able to tolerate a comprehensive/intense rehab program of 3hours/day.     Recommend ongoing mobilization by staff to maintain cardiopulmonary function and prevention of secondary complications related to debility. Discussed with rehab team.

## 2021-10-28 NOTE — PROGRESS NOTE ADULT - ATTENDING COMMENTS
Patient seen on am rounds. Will change to fenestrated 6 trach today. Awaiting placement. PEG functioning well. Pain controlled.

## 2021-10-28 NOTE — PROGRESS NOTE ADULT - ASSESSMENT
61yM, BIBA as trauma B s/p fall from ladder (approx 25 feet), found to have multiple traumatic injuries including scattered SAH, L SDH, multiple facial fractures, including maxillary sinus and nasal bone fractures, Lefort II fracture, B/L distal radial fractures, possible L ICA dissection on CTA, and R posterior acetabular fracture.  CNS: neuro checks  Resp: pulmonary toilet, team will consider decannulating within the next few days   GI/Nutrition: s/p peg tube for dysphagia, continue tube feeds and put to goal   Endo: on medrol dose juan luis FS q6, ISS if needed  Skin: Repositioning for DTI prevention while in bed  Heme/DVT Prophylaxis: SCDs, lovenox  MSK: NWB to bilateral upper and lower ext  Dispo: [facility]

## 2021-10-28 NOTE — PROGRESS NOTE ADULT - SUBJECTIVE AND OBJECTIVE BOX
SUBJECTIVE/24 hour events:  1yM, BIBA as trauma B s/p fall from ladder (approx 25 feet), found to have multiple traumatic injuries including scattered SAH, L SDH, multiple facial fractures, including maxillary sinus and nasal bone fractures, Lefort III fracture, B/L distal radial fractures, possible L ICA dissection on CTA, and R posterior acetabular fracture. Patient trached and peg on 10/20, 10/21 ORIF distal radius fracture. Patient with no acute events overnight, downsized to a 6 finestrated uncuffed trach and tolerated well, mouth remains wired shut, tolerating tube feeds, pain controlled, finger sticks controlled, on cephalexin. Patient evaluated by PM&R and does not meet criteria for acute rehab, dispo to [facility]      Vital Signs Last 24 Hrs  T(C): 37.7 (27 Oct 2021 20:34), Max: 37.7 (27 Oct 2021 20:34)  T(F): 99.9 (27 Oct 2021 20:34), Max: 99.9 (27 Oct 2021 20:34)  HR: 73 (28 Oct 2021 00:00) (63 - 73)  BP: 113/60 (28 Oct 2021 00:00) (111/62 - 130/65)  BP(mean): 76 (27 Oct 2021 16:00) (70 - 76)  RR: 18 (28 Oct 2021 00:00) (18 - 18)  SpO2: 98% (28 Oct 2021 00:00) (96% - 100%)  Drug Dosing Weight  Height (cm): 160 (19 Oct 2021 16:22)  Weight (kg): 67 (19 Oct 2021 16:22)  BMI (kg/m2): 26.2 (19 Oct 2021 16:22)  BSA (m2): 1.7 (19 Oct 2021 16:22)  I&O's Detail    26 Oct 2021 07:01  -  27 Oct 2021 07:00  --------------------------------------------------------  IN:    Jevity 1.5: 1080 mL  Total IN: 1080 mL    OUT:    Voided (mL): 1000 mL  Total OUT: 1000 mL    Total NET: 80 mL      27 Oct 2021 07:01  -  28 Oct 2021 03:51  --------------------------------------------------------  IN:    Jevity 1.5: 360 mL  Total IN: 360 mL    OUT:  Total OUT: 0 mL    Total NET: 360 mL        Allergies    No Known Allergies    Intolerances                ROS:    PHYSICAL EXAM:  Constitutional: comfortable watching tv  ENMT: surgical wires to teeth in place   Respiratory: 6 finestrated uncuffed trach well seated, no respiratory distress, no dyspnea   Cardiovascular: NSR  Gastrointestinal:  abdomen soft, non-tender, peg well seated   Genitourinary: voiding spontaneously   Neurological: awake and alert, calm answers yes and no questions  Skin: warm, dry and no rashes           MEDICATIONS  (STANDING):  artificial  tears Solution 1 Drop(s) Both EYES daily  cephalexin 500 milliGRAM(s) Oral every 12 hours  chlorhexidine 0.12% Liquid 15 milliLiter(s) Oral Mucosa <User Schedule>  diVALproex  milliGRAM(s) Oral two times a day  enalapril 5 milliGRAM(s) Oral daily  enoxaparin Injectable 40 milliGRAM(s) SubCutaneous daily  influenza   Vaccine 0.5 milliLiter(s) IntraMuscular once  insulin lispro (ADMELOG) corrective regimen sliding scale   SubCutaneous three times a day before meals  melatonin 5 milliGRAM(s) Oral at bedtime  methylPREDNISolone 4 milliGRAM(s) Oral before breakfast  methylPREDNISolone 4 milliGRAM(s) Oral after lunch  methylPREDNISolone 4 milliGRAM(s) Oral after dinner  methylPREDNISolone 4 milliGRAM(s) Oral at bedtime  methylPREDNISolone   Oral   QUEtiapine 50 milliGRAM(s) Oral at bedtime    MEDICATIONS  (PRN):  ALBUTerol    90 MICROgram(s) HFA Inhaler 2 Puff(s) Inhalation every 6 hours PRN Wheezing  oxyCODONE    Solution 5 milliGRAM(s) Oral every 4 hours PRN Moderate Pain (4 - 6)  oxyCODONE    Solution 10 milliGRAM(s) Oral every 4 hours PRN Severe Pain (7 - 10)      RADIOLOGY STUDIES:    CULTURES:

## 2021-10-29 LAB
GLUCOSE BLDC GLUCOMTR-MCNC: 142 MG/DL — HIGH (ref 70–99)
GLUCOSE BLDC GLUCOMTR-MCNC: 155 MG/DL — HIGH (ref 70–99)
GLUCOSE BLDC GLUCOMTR-MCNC: 172 MG/DL — HIGH (ref 70–99)

## 2021-10-29 PROCEDURE — 99232 SBSQ HOSP IP/OBS MODERATE 35: CPT

## 2021-10-29 PROCEDURE — 99233 SBSQ HOSP IP/OBS HIGH 50: CPT | Mod: GC

## 2021-10-29 RX ORDER — VALPROIC ACID (AS SODIUM SALT) 250 MG/5ML
250 SOLUTION, ORAL ORAL
Refills: 0 | Status: DISCONTINUED | OUTPATIENT
Start: 2021-10-29 | End: 2021-11-03

## 2021-10-29 RX ORDER — VALPROIC ACID (AS SODIUM SALT) 250 MG/5ML
500 SOLUTION, ORAL ORAL
Refills: 0 | Status: DISCONTINUED | OUTPATIENT
Start: 2021-10-29 | End: 2021-11-03

## 2021-10-29 RX ADMIN — ENOXAPARIN SODIUM 40 MILLIGRAM(S): 100 INJECTION SUBCUTANEOUS at 11:05

## 2021-10-29 RX ADMIN — Medication 1 DROP(S): at 11:07

## 2021-10-29 RX ADMIN — Medication 5 MILLIGRAM(S): at 21:14

## 2021-10-29 RX ADMIN — Medication 4 MILLIGRAM(S): at 05:13

## 2021-10-29 RX ADMIN — OXYCODONE HYDROCHLORIDE 10 MILLIGRAM(S): 5 TABLET ORAL at 21:43

## 2021-10-29 RX ADMIN — Medication 500 MILLIGRAM(S): at 05:14

## 2021-10-29 RX ADMIN — Medication 250 MILLIGRAM(S): at 16:38

## 2021-10-29 RX ADMIN — CHLORHEXIDINE GLUCONATE 15 MILLILITER(S): 213 SOLUTION TOPICAL at 00:53

## 2021-10-29 RX ADMIN — QUETIAPINE FUMARATE 50 MILLIGRAM(S): 200 TABLET, FILM COATED ORAL at 21:15

## 2021-10-29 RX ADMIN — Medication 250 MILLIGRAM(S): at 05:13

## 2021-10-29 RX ADMIN — CHLORHEXIDINE GLUCONATE 15 MILLILITER(S): 213 SOLUTION TOPICAL at 05:18

## 2021-10-29 RX ADMIN — OXYCODONE HYDROCHLORIDE 10 MILLIGRAM(S): 5 TABLET ORAL at 21:13

## 2021-10-29 RX ADMIN — Medication 1: at 10:58

## 2021-10-29 RX ADMIN — CHLORHEXIDINE GLUCONATE 15 MILLILITER(S): 213 SOLUTION TOPICAL at 11:06

## 2021-10-29 RX ADMIN — Medication 4 MILLIGRAM(S): at 21:15

## 2021-10-29 RX ADMIN — Medication 500 MILLIGRAM(S): at 21:14

## 2021-10-29 RX ADMIN — Medication 1: at 05:16

## 2021-10-29 RX ADMIN — CHLORHEXIDINE GLUCONATE 15 MILLILITER(S): 213 SOLUTION TOPICAL at 17:43

## 2021-10-29 NOTE — PROGRESS NOTE ADULT - SUBJECTIVE AND OBJECTIVE BOX
Patient seen this morning. No events overnight. He reports that his headache is under control but complains of difficulty with sleep.    REVIEW OF SYSTEMS  Constitutional - No fever,  No fatigue  HEENT - No vertigo, No neck pain  Neurological - No headaches, No memory loss, No loss of strength, No numbness, No tremors  Musculoskeletal - No joint pain, No joint swelling, No muscle pain  Psychiatric - No depression, No anxiety  All other systems were reviewed and were negative.    FUNCTIONAL PROGRESS      VITALS  T(C): 37.1 (10-29-21 @ 05:10), Max: 37.2 (10-28-21 @ 21:55)  HR: 73 (10-29-21 @ 11:00) (67 - 75)  BP: 107/60 (10-29-21 @ 11:00) (107/60 - 125/59)  RR: 18 (10-29-21 @ 11:00) (18 - 20)  SpO2: 100% (10-29-21 @ 11:00) (97% - 100%)  Wt(kg): --    MEDICATIONS   ALBUTerol    90 MICROgram(s) HFA Inhaler 2 Puff(s) every 6 hours PRN  artificial  tears Solution 1 Drop(s) daily  chlorhexidine 0.12% Liquid 15 milliLiter(s) <User Schedule>  enalapril 5 milliGRAM(s) daily  enoxaparin Injectable 40 milliGRAM(s) daily  influenza   Vaccine 0.5 milliLiter(s) once  insulin lispro (ADMELOG) corrective regimen sliding scale   three times a day before meals  melatonin 5 milliGRAM(s) at bedtime  methylPREDNISolone 4 milliGRAM(s) at bedtime  methylPREDNISolone 4 milliGRAM(s) before breakfast  methylPREDNISolone     oxyCODONE    Solution 5 milliGRAM(s) every 4 hours PRN  oxyCODONE    Solution 10 milliGRAM(s) every 4 hours PRN  QUEtiapine 50 milliGRAM(s) at bedtime  valproic  acid Syrup 250 milliGRAM(s) <User Schedule>  valproic  acid Syrup 500 milliGRAM(s) <User Schedule>      RECENT LABS/IMAGING                       Patient seen this morning. No events overnight. He reports that his headache is under control but complains of difficulty with sleep.    REVIEW OF SYSTEMS  + poor sleep, loss of strength    FUNCTIONAL PROGRESS  10/24 OT  Bathing Training:     · Level of [place]	dependent (less than 25% patients effort)    Upper Body Dressing Training:     · Level of [place]	maximum assist (25% patients effort)    Lower Body Dressing Training:     · Level of [place]	dependent (less than 25% patients effort)    Toilet Hygiene Training:     · Level of [place]	dependent (less than 25% patients effort); Pt lets staff know when he needs to use urinal by waving arm.  Bedpan for BM.    Grooming Training:     · Level of [place]	dependent (less than 25% patients effort)    Eating/Self-Feeding Training:     · Level of [place]	dependent (less than 25% patients effort); +PEG due to facial surgeries, jaw wired shut      VITALS  T(C): 37.1 (10-29-21 @ 05:10), Max: 37.2 (10-28-21 @ 21:55)  HR: 73 (10-29-21 @ 11:00) (67 - 75)  BP: 107/60 (10-29-21 @ 11:00) (107/60 - 125/59)  RR: 18 (10-29-21 @ 11:00) (18 - 20)  SpO2: 100% (10-29-21 @ 11:00) (97% - 100%)  Wt(kg): --    MEDICATIONS   ALBUTerol    90 MICROgram(s) HFA Inhaler 2 Puff(s) every 6 hours PRN  artificial  tears Solution 1 Drop(s) daily  chlorhexidine 0.12% Liquid 15 milliLiter(s) <User Schedule>  enalapril 5 milliGRAM(s) daily  enoxaparin Injectable 40 milliGRAM(s) daily  influenza   Vaccine 0.5 milliLiter(s) once  insulin lispro (ADMELOG) corrective regimen sliding scale   three times a day before meals  melatonin 5 milliGRAM(s) at bedtime  methylPREDNISolone 4 milliGRAM(s) at bedtime  methylPREDNISolone 4 milliGRAM(s) before breakfast  methylPREDNISolone     oxyCODONE    Solution 5 milliGRAM(s) every 4 hours PRN  oxyCODONE    Solution 10 milliGRAM(s) every 4 hours PRN  QUEtiapine 50 milliGRAM(s) at bedtime  valproic  acid Syrup 250 milliGRAM(s) <User Schedule>  valproic  acid Syrup 500 milliGRAM(s) <User Schedule>      RECENT LABS/IMAGING             10.4   8.58  )-----------( 284      ( 24 Oct 2021 06:55 )             30.2     10-24    134<L>  |  102  |  18.8  ----------------------------<  151<H>  4.4   |  20.0<L>  |  0.52    Ca    7.9<L>      24 Oct 2021 06:55  Phos  2.4     10-24  Mg     2.2     10-24    CT Head/C-spine/Maxillofacial 10/17:  There is an acute fracture involving the frontal bone which traverse the frontal sinus as detailed with subarachnoid hemorrhage in the basal cisterns and acute subdural hemorrhages as described. There is no midline shift or herniation. There is an acute right LeFort fracture II. Complex nasoethmoidal orbital fractures as described in details above. The skull base fracture involving the left sphenoid bone traverses through the anterior wall of the carotid canal and left clinoid processes, therefore CT angiography of the brain is recommended to exclude vascular dissection. Acute fracture of the left fovea ethmoidalis ethmoidalis for which CSF leak cannot be entirely excluded. No cervical spine fracture.    CT C/A/P 10/17: Stranding of the mediastinal fat adjacent to the aorta, esophagus, and azygos vein concerning for mediastinal hematoma. No evidence of active extravasation or acute aortic injury. Findings may reflect venous injury. Small patchy airspace opacity at the left lung base may reflect small contusion. Comminuted fracture of the right acetabulum.    Xray Knee 3 Views, Bilateral 10/17: No acute bony pathology.    Xray Femur 2 Views, Bilat 10/17: No acute bony pathology.    Xray Wrist 3 Views, Bilateral 10/17: Three views of the right wrist and three views of the left wrist show comminuted fracture of the distal left radius with comminuted buckle fracture and dorsal angulation of the distal right radius.    Xray Forearm, Bilateral 10/17:  Two views of the right forearm and two views of the left forearm show comminuted fractureof the distal left radius and comminuted buckle fracture of the distal right radius.    Xray Tibia + Fibula 2 Views, Bilateral 10/17:  Two views of the right leg and two views of the left leg show no evidence of fracture nor destructive change. The joint spaces are maintained.    CTA Head 10/17: There is no large vessel occlusion or aneurysm involving major proximal intracranial arteries.    CTA NECK 10/17: Hypodensities in the left internal carotid artery in the petrous portion of the left temporal bone, for which multifocal dissection is difficult to exclude secondary to adjacent streak artifact. Further correlation with MRI of neck is suggested including T1 fat saturated images. There is no stenosis involving major neck arteries.    Xray Wrist 3 Views, Right 10/17: Following reduction fracture segments are in anatomical alignment.  Fracture is stabilized by applied cast.    Xray Hand 3 Views, Left 10/17: There is a comminuted intra-articular fracture distal LEFT radial metaphysis and epiphysis. Carpal bones, metacarpal bones intact. There is a dorsal dislocation of the third DIP joint. Dorsal dislocation of fourth interphalangeal joint. Remaining osseous structures intact.    Xray Hand Post Reduction, Left 10/17: Following reduction distal radius fracture segments are in anatomical alignment. Reduction of dislocated third DIP joint and fourth interphalangeal joint. Cast applied. Reduction of fracture segments and dislocated phalanges as described.    Xray Wrist Post Reduction AP/Lat, Left 10/17: Following reduction fracture segments are in anatomical alignment.  Fracture is stabilized by applied cast.    CT Head 10/17: Slightly increased frontal parenchymal hemorrhage just above the cribriform plate compared with 11:46 AM. Otherwise no change in traumatic subarachnoid and subdural hemorrhage.    Xray Shoulder 2 Views, Left 10/17: No acute radiographic osseous pathology.    CT Knee No Cont, Right 10/18:  1. No acute fracture or dislocation.  2.  Mild osteoarthritis of the patellofemoral compartment of the joint.  3.  Small knee joint effusion.  4.  Subcutaneous edema along the anteromedial aspect of the joint which may be secondary to direct contusion.    Xray Elbow AP + Lateral, Left 10/18: Suggestion of slight CHF. Blunting of the left elbow.    Brain MRI 10/19: 1.  Bilateral frontal, parietal occipital, and parafalcine subdural hemorrhages. Subarachnoid hemorrhage along the bilateral cerebral convexities and in the basal cisterns. Bilateral frontal hemorrhagic contusions, right greater than left. Bilateral occipital hemorrhagic contusions. No significant midline shift, hydrocephalus, or effacement of basal cisterns. 2.  Multiple skull, skull base and facial fractures, as seen on prior CTs, raising the possibility of CSF leak.    Brain MRA 10/19: No large vessel occlusion or aneurysm.    Neck MRA 10/19: No hemodynamically significant stenosis of the bilateral cervical ICAs by NASCET criteria.    CT Wrist No Cont, Left 10/19: 1.  Comminuted mildly depressed fracture the distal radius. 2.  Avulsion fracture of the triquetrum suspect acute on chronic. 3.  Moderate soft tissue swelling about the wrist. 4.  Cystic changes in the lunate and proximal scaphoid likely degenerative.    CT Wrist No Cont, Right 10/19: 1.  Comminuted and impacted fracture of the distal radius with volar angulation the fracture.  2.  Small avulsion fracture at the dorsal distal triquetrum. 3.  Small avulsion fracture at the ulnar styloid process. 4.  Soft tissue swelling.    Xray Wrist 3 Views, Bilateral 10/21: Anatomical alignment post ORIF of bilateral distal radial fractures    Xray Pelvis AP only 10/21: Two views of the pelvis demonstrate a mildly displaced right acetabular fracture. Mild degenerative change otherwise evident.      ----------------------------------------------------------------------------------------  PHYSICAL EXAM  Constitutional - NAD, Comfortable  HEENT - Multiple facial injuries/swelling   Neck - Closed trach system  Extremities - No edema  Neurologic Exam -                    Cognitive - Awake/Alert     Communication - Responds yes/no     Motor - No focal deficits noted (limited by pain/BUE casts)  Psychiatric - Calm, Fatigued  ----------------------------------------------------------------------------------------  ASSESSMENT/PLAN  61yMale with functional deficits after falling off a ladder and suffering multiple trauma  Bilateral Radial Fractures s/p ORIF - NWB  Right Acetabular Fracture - TTWB  Facial Fractures s/p ORIF - Medrol  HTN - Vasotec  Restlessness - Depakote 250 mg Q8AM. Increased evening dose to 500 mg Q8PM to assist with sleep on 10/29 (originally started 10/25)  Headache Prophylaxis - Depakote  Sleep - Melatonin, Seroquel  Dysphagia - PEG/TF.  Pain - Oxycodone  DVT PPX - SCDs, Lovenox  Rehab - Continue to recommend [facility], patient DOES NOT meet acute inpatient rehabilitation criteria. Patient needs a more prolonged stay to achieve transition to community living and would not be able to tolerate a comprehensive/intense rehab program of 3hours/day.     Brain injury medicine rehab team will sign off at this time. Thank you for involving us in the care of this patient.    Recommend ongoing mobilization by staff to maintain cardiopulmonary function and prevention of secondary complications related to debility. Discussed with rehab team.

## 2021-10-29 NOTE — PROGRESS NOTE ADULT - SUBJECTIVE AND OBJECTIVE BOX
INTERVAL HPI/OVERNIGHT EVENTS:    Patient seen this AM with no acute events overnight. Patient without any acute complaints at this time. Patients' pain is well controlled on current pain regiment. Tolerating diet without any n/v, has been having normal bowel function. Remains hemodynamically stable and denies fevers, chills. No CP or SOB Tolerating trach collar with fenestrated shiley.      MEDICATIONS  (STANDING):  artificial  tears Solution 1 Drop(s) Both EYES daily  cephalexin 500 milliGRAM(s) Oral every 12 hours  chlorhexidine 0.12% Liquid 15 milliLiter(s) Oral Mucosa <User Schedule>  enalapril 5 milliGRAM(s) Oral daily  enoxaparin Injectable 40 milliGRAM(s) SubCutaneous daily  influenza   Vaccine 0.5 milliLiter(s) IntraMuscular once  insulin lispro (ADMELOG) corrective regimen sliding scale   SubCutaneous three times a day before meals  melatonin 5 milliGRAM(s) Oral at bedtime  methylPREDNISolone 4 milliGRAM(s) Oral after dinner  methylPREDNISolone 4 milliGRAM(s) Oral at bedtime  methylPREDNISolone   Oral   methylPREDNISolone 4 milliGRAM(s) Oral before breakfast  QUEtiapine 50 milliGRAM(s) Oral at bedtime  valproic  acid Syrup 250 milliGRAM(s) Oral two times a day    MEDICATIONS  (PRN):  ALBUTerol    90 MICROgram(s) HFA Inhaler 2 Puff(s) Inhalation every 6 hours PRN Wheezing  oxyCODONE    Solution 5 milliGRAM(s) Oral every 4 hours PRN Moderate Pain (4 - 6)  oxyCODONE    Solution 10 milliGRAM(s) Oral every 4 hours PRN Severe Pain (7 - 10)      Vital Signs Last 24 Hrs  T(C): 37.2 (28 Oct 2021 21:55), Max: 37.6 (28 Oct 2021 08:05)  T(F): 99 (28 Oct 2021 21:55), Max: 99.7 (28 Oct 2021 08:05)  HR: 71 (29 Oct 2021 00:00) (71 - 75)  BP: 125/59 (28 Oct 2021 21:55) (116/58 - 125/59)  BP(mean): --  RR: 20 (28 Oct 2021 21:55) (17 - 20)  SpO2: 98% (29 Oct 2021 00:00) (97% - 100%)    PHYSICAL EXAM:  Constitutional: comfortable, NAD  ENMT: surgical wires to teeth in place   Respiratory: 6 unfenestrated trach in place. no respiratory distress, no dyspnea   Cardiovascular: NSR  Gastrointestinal:  abdomen soft, non-tender, peg well seated   Genitourinary: voiding spontaneously   Neurological: awake and alert, calm answers yes and no questions  Skin: warm, dry and no rashes     27 Oct 2021 07:01  -  28 Oct 2021 07:00  --------------------------------------------------------  IN:    Jevity 1.5: 450 mL  Total IN: 450 mL    OUT:    Voided (mL): 300 mL  Total OUT: 300 mL    Total NET: 150 mL      28 Oct 2021 07:01  -  29 Oct 2021 04:33  --------------------------------------------------------  IN:    Oral Fluid: 400 mL  Total IN: 400 mL    OUT:    Voided (mL): 500 mL  Total OUT: 500 mL    Total NET: -100 mL          LABS:                RADIOLOGY & ADDITIONAL STUDIES: Speaking Coherently

## 2021-10-29 NOTE — PROGRESS NOTE ADULT - ASSESSMENT
A: 60 y/o M s/p fall off a ladder and suffering multiple injuries including lefort 2 fx and frontal sinus  MMF w/ arch bars and wired    - cont peridex QID  - dental wax at the bedside to be placed PRN areas of contact of wires with gingiva/ inner lip  - plastics team will closely follow  - please contact for any acute changes [phone]

## 2021-10-29 NOTE — PROGRESS NOTE ADULT - ASSESSMENT
61yM, BIBA as trauma B s/p fall from ladder (approx 25 feet), found to have multiple traumatic injuries including scattered SAH, L SDH, multiple facial fractures, including maxillary sinus and nasal bone fractures, Lefort II fracture, B/L distal radial fractures, possible L ICA dissection on CTA, and R posterior acetabular fracture.  CNS: neuro checks  Resp: pulmonary toilet, team will consider decannulating within the next few days   GI/Nutrition: s/p peg tube for dysphagia, continue tube feeds and put to goal   Endo: on medrol dose juan luis FS q6, ISS if needed  Skin: Repositioning for DTI prevention while in bed  Heme/DVT Prophylaxis: SCDs, lovenox  MSK: NWB to bilateral upper and lower ext  Dispo: [facility]

## 2021-10-29 NOTE — PROGRESS NOTE ADULT - SUBJECTIVE AND OBJECTIVE BOX
Pt seen and in no acute distress.  No acute events overnight.           10.4   8.58  )-----------( 284      ( 24 Oct 2021 06:55 )             30.2     10-24    134<L>  |  102  |  18.8  ----------------------------<  151<H>  4.4   |  20.0<L>  |  0.52    Ca    7.9<L>      24 Oct 2021 06:55  Phos  2.4     10-24  Mg     2.2     10-24    Physical Exam  General: asleep, easily arousable, in no acute distress  HEENT: + trach, mmf in place in occlusion, wires sharp dental wax applied, dorsum midline, nares intact  Chest: equal chest rise

## 2021-10-30 LAB
GLUCOSE BLDC GLUCOMTR-MCNC: 136 MG/DL — HIGH (ref 70–99)
GLUCOSE BLDC GLUCOMTR-MCNC: 160 MG/DL — HIGH (ref 70–99)
GLUCOSE BLDC GLUCOMTR-MCNC: 164 MG/DL — HIGH (ref 70–99)
GLUCOSE BLDC GLUCOMTR-MCNC: 180 MG/DL — HIGH (ref 70–99)
GLUCOSE BLDC GLUCOMTR-MCNC: 202 MG/DL — HIGH (ref 70–99)
SARS-COV-2 RNA SPEC QL NAA+PROBE: SIGNIFICANT CHANGE UP

## 2021-10-30 PROCEDURE — 99024 POSTOP FOLLOW-UP VISIT: CPT | Mod: GC

## 2021-10-30 RX ADMIN — Medication 1 DROP(S): at 12:46

## 2021-10-30 RX ADMIN — OXYCODONE HYDROCHLORIDE 10 MILLIGRAM(S): 5 TABLET ORAL at 17:24

## 2021-10-30 RX ADMIN — Medication 500 MILLIGRAM(S): at 21:39

## 2021-10-30 RX ADMIN — OXYCODONE HYDROCHLORIDE 10 MILLIGRAM(S): 5 TABLET ORAL at 09:19

## 2021-10-30 RX ADMIN — Medication 1: at 05:06

## 2021-10-30 RX ADMIN — QUETIAPINE FUMARATE 50 MILLIGRAM(S): 200 TABLET, FILM COATED ORAL at 21:39

## 2021-10-30 RX ADMIN — Medication 5 MILLIGRAM(S): at 21:39

## 2021-10-30 RX ADMIN — OXYCODONE HYDROCHLORIDE 10 MILLIGRAM(S): 5 TABLET ORAL at 18:20

## 2021-10-30 RX ADMIN — CHLORHEXIDINE GLUCONATE 15 MILLILITER(S): 213 SOLUTION TOPICAL at 23:24

## 2021-10-30 RX ADMIN — Medication 5 MILLIGRAM(S): at 05:06

## 2021-10-30 RX ADMIN — CHLORHEXIDINE GLUCONATE 15 MILLILITER(S): 213 SOLUTION TOPICAL at 05:05

## 2021-10-30 RX ADMIN — ENOXAPARIN SODIUM 40 MILLIGRAM(S): 100 INJECTION SUBCUTANEOUS at 21:38

## 2021-10-30 RX ADMIN — CHLORHEXIDINE GLUCONATE 15 MILLILITER(S): 213 SOLUTION TOPICAL at 00:26

## 2021-10-30 RX ADMIN — OXYCODONE HYDROCHLORIDE 10 MILLIGRAM(S): 5 TABLET ORAL at 10:17

## 2021-10-30 RX ADMIN — Medication 4 MILLIGRAM(S): at 05:05

## 2021-10-30 RX ADMIN — CHLORHEXIDINE GLUCONATE 15 MILLILITER(S): 213 SOLUTION TOPICAL at 17:23

## 2021-10-30 RX ADMIN — Medication 250 MILLIGRAM(S): at 08:05

## 2021-10-30 RX ADMIN — CHLORHEXIDINE GLUCONATE 15 MILLILITER(S): 213 SOLUTION TOPICAL at 12:45

## 2021-10-30 RX ADMIN — Medication 2: at 12:46

## 2021-10-30 NOTE — PROGRESS NOTE ADULT - SUBJECTIVE AND OBJECTIVE BOX
INTERVAL HPI/OVERNIGHT EVENTS: No acute events tonight. Wire cutters now at bedside. Tolerating parenteral feeds. No n/v, cp/sob, passing gas. Pain is well controlled.    61yM, BIBA as trauma B s/p fall from ladder (approx 25 feet), found to have multiple traumatic injuries including scattered SAH, L SDH, multiple facial fractures, including maxillary sinus and nasal bone fractures, Lefort III fracture, B/L distal radial fractures, possible L ICA dissection on CTA, and R posterior acetabular fracture. Patient trached and peg on 10/20, 10/21 ORIF distal radius fracture. Patient with no acute events overnight, downsized to a 6 fenestrated uncuffed trach and tolerated well, mouth remains wired shut, tolerating tube feeds, pain controlled, finger sticks controlled, on cephalexin. Patient evaluated by PM&R and does not meet criteria for acute rehab, dispo to [facility]    MEDICATIONS  (STANDING):  artificial  tears Solution 1 Drop(s) Both EYES daily  chlorhexidine 0.12% Liquid 15 milliLiter(s) Oral Mucosa <User Schedule>  enalapril 5 milliGRAM(s) Oral daily  enoxaparin Injectable 40 milliGRAM(s) SubCutaneous daily  influenza   Vaccine 0.5 milliLiter(s) IntraMuscular once  insulin lispro (ADMELOG) corrective regimen sliding scale   SubCutaneous three times a day before meals  melatonin 5 milliGRAM(s) Oral at bedtime  methylPREDNISolone 4 milliGRAM(s) Oral before breakfast  methylPREDNISolone   Oral   QUEtiapine 50 milliGRAM(s) Oral at bedtime  valproic  acid Syrup 250 milliGRAM(s) Oral <User Schedule>  valproic  acid Syrup 500 milliGRAM(s) Oral <User Schedule>    MEDICATIONS  (PRN):  ALBUTerol    90 MICROgram(s) HFA Inhaler 2 Puff(s) Inhalation every 6 hours PRN Wheezing  oxyCODONE    Solution 10 milliGRAM(s) Oral every 4 hours PRN Severe Pain (7 - 10)  oxyCODONE    Solution 5 milliGRAM(s) Oral every 4 hours PRN Moderate Pain (4 - 6)      Vital Signs Last 24 Hrs  T(C): 37.1 (29 Oct 2021 20:40), Max: 37.1 (29 Oct 2021 05:10)  T(F): 98.8 (29 Oct 2021 20:40), Max: 98.8 (29 Oct 2021 05:10)  HR: 63 (30 Oct 2021 00:00) (63 - 73)  BP: 133/64 (29 Oct 2021 20:40) (107/60 - 133/64)  BP(mean): 72 (29 Oct 2021 11:00) (72 - 72)  RR: 18 (29 Oct 2021 20:40) (18 - 20)  SpO2: 98% (30 Oct 2021 00:00) (98% - 100%)    Physical Exam:  General: well appearing, NAD  Head: NC, sutured lacerating to forehead, mouth wired shut  EENT: EOMI, no scleral icterus  Cardiac: RRR, no apparent murmurs, no lower extremity edema  Respiratory: CTABL, no respiratory distress   Abdomen: soft, ND, NT, nonperitonitic  MSK/Vascular: full ROM, soft compartments, warm extremities  Neuro: AAOx3, sensation to light touch intact  Psych: calm, cooperative        I&O's Detail    28 Oct 2021 07:01  -  29 Oct 2021 07:00  --------------------------------------------------------  IN:    Jevity 1.5: 540 mL    Oral Fluid: 400 mL  Total IN: 940 mL    OUT:    Voided (mL): 1300 mL  Total OUT: 1300 mL    Total NET: -360 mL      29 Oct 2021 07:01  -  30 Oct 2021 02:33  --------------------------------------------------------  IN:  Total IN: 0 mL    OUT:    Voided (mL): 500 mL  Total OUT: 500 mL    Total NET: -500 mL          LABS:                RADIOLOGY & ADDITIONAL STUDIES:

## 2021-10-30 NOTE — PROGRESS NOTE ADULT - ATTENDING COMMENTS
Agree with above assessment.  The patient was seen and examined by me.  The patient is without new events overnight.  Trach and PEG intact.  Trauma stable, discharge planning.

## 2021-10-31 LAB
GLUCOSE BLDC GLUCOMTR-MCNC: 129 MG/DL — HIGH (ref 70–99)
GLUCOSE BLDC GLUCOMTR-MCNC: 159 MG/DL — HIGH (ref 70–99)
GLUCOSE BLDC GLUCOMTR-MCNC: 164 MG/DL — HIGH (ref 70–99)
GLUCOSE BLDC GLUCOMTR-MCNC: 166 MG/DL — HIGH (ref 70–99)
GLUCOSE BLDC GLUCOMTR-MCNC: 166 MG/DL — HIGH (ref 70–99)

## 2021-10-31 PROCEDURE — 99024 POSTOP FOLLOW-UP VISIT: CPT | Mod: GC

## 2021-10-31 PROCEDURE — 73110 X-RAY EXAM OF WRIST: CPT | Mod: 26,50

## 2021-10-31 RX ADMIN — QUETIAPINE FUMARATE 50 MILLIGRAM(S): 200 TABLET, FILM COATED ORAL at 21:20

## 2021-10-31 RX ADMIN — Medication 1: at 05:19

## 2021-10-31 RX ADMIN — Medication 500 MILLIGRAM(S): at 20:17

## 2021-10-31 RX ADMIN — OXYCODONE HYDROCHLORIDE 10 MILLIGRAM(S): 5 TABLET ORAL at 14:52

## 2021-10-31 RX ADMIN — OXYCODONE HYDROCHLORIDE 10 MILLIGRAM(S): 5 TABLET ORAL at 12:39

## 2021-10-31 RX ADMIN — Medication 5 MILLIGRAM(S): at 21:20

## 2021-10-31 RX ADMIN — CHLORHEXIDINE GLUCONATE 15 MILLILITER(S): 213 SOLUTION TOPICAL at 12:39

## 2021-10-31 RX ADMIN — OXYCODONE HYDROCHLORIDE 10 MILLIGRAM(S): 5 TABLET ORAL at 06:36

## 2021-10-31 RX ADMIN — CHLORHEXIDINE GLUCONATE 15 MILLILITER(S): 213 SOLUTION TOPICAL at 05:13

## 2021-10-31 RX ADMIN — OXYCODONE HYDROCHLORIDE 10 MILLIGRAM(S): 5 TABLET ORAL at 18:53

## 2021-10-31 RX ADMIN — Medication 1: at 12:40

## 2021-10-31 RX ADMIN — Medication 5 MILLIGRAM(S): at 05:13

## 2021-10-31 RX ADMIN — Medication 250 MILLIGRAM(S): at 09:00

## 2021-10-31 RX ADMIN — ENOXAPARIN SODIUM 40 MILLIGRAM(S): 100 INJECTION SUBCUTANEOUS at 12:39

## 2021-10-31 NOTE — PROGRESS NOTE ADULT - SUBJECTIVE AND OBJECTIVE BOX
INTERVAL HPI/OVERNIGHT EVENTS: No acute events overnight.  Pending AR    MEDICATIONS  (STANDING):  artificial  tears Solution 1 Drop(s) Both EYES daily  chlorhexidine 0.12% Liquid 15 milliLiter(s) Oral Mucosa <User Schedule>  enalapril 5 milliGRAM(s) Oral daily  enoxaparin Injectable 40 milliGRAM(s) SubCutaneous daily  influenza   Vaccine 0.5 milliLiter(s) IntraMuscular once  insulin lispro (ADMELOG) corrective regimen sliding scale   SubCutaneous three times a day before meals  melatonin 5 milliGRAM(s) Oral at bedtime  methylPREDNISolone 4 milliGRAM(s) Oral before breakfast  methylPREDNISolone   Oral   QUEtiapine 50 milliGRAM(s) Oral at bedtime  valproic  acid Syrup 250 milliGRAM(s) Oral <User Schedule>  valproic  acid Syrup 500 milliGRAM(s) Oral <User Schedule>    MEDICATIONS  (PRN):  ALBUTerol    90 MICROgram(s) HFA Inhaler 2 Puff(s) Inhalation every 6 hours PRN Wheezing  oxyCODONE    Solution 10 milliGRAM(s) Oral every 4 hours PRN Severe Pain (7 - 10)  oxyCODONE    Solution 5 milliGRAM(s) Oral every 4 hours PRN Moderate Pain (4 - 6)    Vital Signs Last 24 Hrs  T(C): 37.1 (30 Oct 2021 20:29), Max: 37.1 (30 Oct 2021 04:59)  T(F): 98.7 (30 Oct 2021 20:29), Max: 98.7 (30 Oct 2021 04:59)  HR: 68 (30 Oct 2021 20:29) (66 - 76)  BP: 136/63 (30 Oct 2021 20:29) (116/60 - 136/63)  BP(mean): --  ABP: --  ABP(mean): --  RR: 18 (30 Oct 2021 20:29) (18 - 20)  SpO2: 100% (30 Oct 2021 20:29) (100% - 100%)      Physical Exam:  General: well appearing, NAD  Head: NC, sutured lacerating to forehead, mouth wired shut  EENT: EOMI, no scleral icterus  Cardiac: RRR, no apparent murmurs, no lower extremity edema  Respiratory: CTABL, no respiratory distress   Abdomen: soft, ND, NT, non-peritonitic  MSK/Vascular: full ROM, soft compartments, warm extremities  Neuro: AAOx3, sensation to light touch intact  Psych: calm, cooperative    I&O's Detail    29 Oct 2021 07:01  -  30 Oct 2021 07:00  --------------------------------------------------------  IN:    Jevity 1.5: 540 mL  Total IN: 540 mL    OUT:    Voided (mL): 800 mL  Total OUT: 800 mL    Total NET: -260 mL      30 Oct 2021 07:01  -  31 Oct 2021 01:15  --------------------------------------------------------  IN:    Enteral Tube Flush: 280 mL    Free Water: 500 mL    Jevity 1.5: 765 mL  Total IN: 1545 mL    OUT:    Voided (mL): 1200 mL  Total OUT: 1200 mL    Total NET: 345 mL      .  LABS:                     RADIOLOGY, EKG & ADDITIONAL TESTS: Reviewed.

## 2021-10-31 NOTE — PROGRESS NOTE ADULT - ATTENDING COMMENTS
Agree with above assessment.  The patient was seen and examined by me.  The patient remains stable without acute events overnight. Trach and PEG remain in place and intact.  Trauma stable for discharge planning.

## 2021-10-31 NOTE — PROGRESS NOTE ADULT - SUBJECTIVE AND OBJECTIVE BOX
Patient encountered for suture check, pt is s/p b/l DR WHITLOCK POD #10    splints removed  sutures C/D/I, no erythema, no active drainage. + ROM phalanges. SILT. ext warm cap refill brisk.  xrays reviewed by Dr. Webb  d/w dr. webb, re-eval sutures for possible removal POD #17  b/l velcro brace with gauze/ace bandage dressings applied

## 2021-10-31 NOTE — PROGRESS NOTE ADULT - ASSESSMENT
61yM, BIBA as trauma B s/p fall from ladder (approx 25 feet), found to have multiple traumatic injuries including scattered SAH, L SDH, multiple facial fractures, including maxillary sinus and nasal bone fractures, Lefort II fracture, B/L distal radial fractures, possible L ICA dissection on CTA, and R posterior acetabular fracture.    CNS: neuro checks  Resp: pulmonary toilet  GI/Nutrition: s/p peg tube for dysphagia, continue tube feeds  Endo: on medrol dose juan luis FS q6, ISS if needed  Skin: Repositioning for DTI prevention while in bed  Heme/DVT Prophylaxis: SCDs, lovenox  MSK: NWB to bilateral upper and lower ext  Dispo: [facility]

## 2021-11-01 LAB
GLUCOSE BLDC GLUCOMTR-MCNC: 133 MG/DL — HIGH (ref 70–99)
GLUCOSE BLDC GLUCOMTR-MCNC: 153 MG/DL — HIGH (ref 70–99)
GLUCOSE BLDC GLUCOMTR-MCNC: 161 MG/DL — HIGH (ref 70–99)
GLUCOSE BLDC GLUCOMTR-MCNC: 179 MG/DL — HIGH (ref 70–99)

## 2021-11-01 PROCEDURE — 99232 SBSQ HOSP IP/OBS MODERATE 35: CPT

## 2021-11-01 RX ORDER — VALPROIC ACID (AS SODIUM SALT) 250 MG/5ML
0 SOLUTION, ORAL ORAL
Qty: 0 | Refills: 0 | DISCHARGE
Start: 2021-11-01

## 2021-11-01 RX ORDER — LANOLIN ALCOHOL/MO/W.PET/CERES
1 CREAM (GRAM) TOPICAL
Qty: 0 | Refills: 0 | DISCHARGE
Start: 2021-11-01

## 2021-11-01 RX ORDER — ALBUTEROL 90 UG/1
2 AEROSOL, METERED ORAL
Qty: 0 | Refills: 0 | DISCHARGE
Start: 2021-11-01

## 2021-11-01 RX ORDER — OXYCODONE HYDROCHLORIDE 5 MG/1
5 TABLET ORAL
Qty: 0 | Refills: 0 | DISCHARGE
Start: 2021-11-01

## 2021-11-01 RX ORDER — CHLORHEXIDINE GLUCONATE 213 G/1000ML
15 SOLUTION TOPICAL
Qty: 0 | Refills: 0 | DISCHARGE
Start: 2021-11-01

## 2021-11-01 RX ORDER — QUETIAPINE FUMARATE 200 MG/1
1 TABLET, FILM COATED ORAL
Qty: 0 | Refills: 0 | DISCHARGE
Start: 2021-11-01

## 2021-11-01 RX ORDER — INSULIN LISPRO 100/ML
0 VIAL (ML) SUBCUTANEOUS
Qty: 0 | Refills: 0 | DISCHARGE
Start: 2021-11-01

## 2021-11-01 RX ORDER — OXYCODONE HYDROCHLORIDE 5 MG/1
10 TABLET ORAL
Qty: 0 | Refills: 0 | DISCHARGE
Start: 2021-11-01

## 2021-11-01 RX ORDER — ENOXAPARIN SODIUM 100 MG/ML
40 INJECTION SUBCUTANEOUS
Qty: 0 | Refills: 0 | DISCHARGE
Start: 2021-11-01

## 2021-11-01 RX ADMIN — OXYCODONE HYDROCHLORIDE 10 MILLIGRAM(S): 5 TABLET ORAL at 09:00

## 2021-11-01 RX ADMIN — OXYCODONE HYDROCHLORIDE 10 MILLIGRAM(S): 5 TABLET ORAL at 15:55

## 2021-11-01 RX ADMIN — ENOXAPARIN SODIUM 40 MILLIGRAM(S): 100 INJECTION SUBCUTANEOUS at 13:11

## 2021-11-01 RX ADMIN — OXYCODONE HYDROCHLORIDE 10 MILLIGRAM(S): 5 TABLET ORAL at 20:32

## 2021-11-01 RX ADMIN — Medication 1: at 13:10

## 2021-11-01 RX ADMIN — Medication 250 MILLIGRAM(S): at 10:47

## 2021-11-01 RX ADMIN — Medication 5 MILLIGRAM(S): at 05:35

## 2021-11-01 RX ADMIN — QUETIAPINE FUMARATE 50 MILLIGRAM(S): 200 TABLET, FILM COATED ORAL at 21:29

## 2021-11-01 RX ADMIN — Medication 500 MILLIGRAM(S): at 20:32

## 2021-11-01 RX ADMIN — OXYCODONE HYDROCHLORIDE 10 MILLIGRAM(S): 5 TABLET ORAL at 22:09

## 2021-11-01 RX ADMIN — OXYCODONE HYDROCHLORIDE 10 MILLIGRAM(S): 5 TABLET ORAL at 08:28

## 2021-11-01 RX ADMIN — OXYCODONE HYDROCHLORIDE 10 MILLIGRAM(S): 5 TABLET ORAL at 14:21

## 2021-11-01 RX ADMIN — Medication 5 MILLIGRAM(S): at 21:28

## 2021-11-01 RX ADMIN — OXYCODONE HYDROCHLORIDE 10 MILLIGRAM(S): 5 TABLET ORAL at 19:23

## 2021-11-01 NOTE — PROGRESS NOTE ADULT - SUBJECTIVE AND OBJECTIVE BOX
Acute Care Surgery/Trauma Surgery Progress Note:    Patient pulled accidentally his tracheostom and it was placed back in place with no other events. Patient afebrile, and hemodynamicallycompetent . Pain well controlled. Tolerating gastrostomy feeding diet. Denies n/v/f/c/cp/sob.     Diet, NPO with Tube Feed:   Tube Feeding Modality: Gastrostomy  Jevity 1.5 Riccardo (JEVITY1.5)  Total Volume for 24 Hours (mL): 1080  Continuous  Starting Tube Feed Rate mL per Hour: 10  Increase Tube Feed Rate by (mL): 10     Every 8 hours  Until Goal Tube Feed Rate (mL per Hour): 45  Tube Feed Duration (in Hours): 24  Tube Feed Start Time: 19:00 (10-21-21 @ 18:30)      Scheduled Medications:   artificial  tears Solution 1 Drop(s) Both EYES daily  chlorhexidine 0.12% Liquid 15 milliLiter(s) Oral Mucosa <User Schedule>  enalapril 5 milliGRAM(s) Oral daily  enoxaparin Injectable 40 milliGRAM(s) SubCutaneous daily  influenza   Vaccine 0.5 milliLiter(s) IntraMuscular once  insulin lispro (ADMELOG) corrective regimen sliding scale   SubCutaneous three times a day before meals  melatonin 5 milliGRAM(s) Oral at bedtime  QUEtiapine 50 milliGRAM(s) Oral at bedtime  valproic  acid Syrup 250 milliGRAM(s) Oral <User Schedule>  valproic  acid Syrup 500 milliGRAM(s) Oral <User Schedule>    PRN Medications:  ALBUTerol    90 MICROgram(s) HFA Inhaler 2 Puff(s) Inhalation every 6 hours PRN Wheezing  oxyCODONE    Solution 5 milliGRAM(s) Oral every 4 hours PRN Moderate Pain (4 - 6)  oxyCODONE    Solution 10 milliGRAM(s) Oral every 4 hours PRN Severe Pain (7 - 10)      Objective:   T(F): 98.3 (10-31 @ 16:10), Max: 98.8 (10-31 @ 05:12)  HR: 67 (10-31 @ 16:10) (67 - 72)  BP: 123/72 (10-31 @ 16:10) (123/72 - 126/57)  BP(mean): --  ABP: --  ABP(mean): --  RR: 18 (10-31 @ 16:10) (18 - 18)  SpO2: 99% (10-31 @ 16:10) (96% - 100%)      Physical Exam:   GEN: patient resting comfortably in bed, in no acute distress.  Oral: stabilizing wire in palce.   RESP: respirations are unlabored, no accessory muscle use, no conversational dyspnea. Tracheostomy tube in place.  CVS: RRR  GI: Abdomen soft, non-tender, non-distended, no rebound tenderness / guarding.  Extremities: symmetrics, mobile 6/6.  Neuro: oriented in person.    I&O's    10-30 @ 07:01  -  10-31 @ 07:00  --------------------------------------------------------  IN:    Enteral Tube Flush: 480 mL    Free Water: 500 mL    Jevity 1.5: 1080 mL  Total IN: 2060 mL    OUT:    Voided (mL): 1675 mL  Total OUT: 1675 mL    Total NET: 385 mL      10-31 @ 07:01  -  11-01 @ 00:18  --------------------------------------------------------  IN:  Total IN: 0 mL    OUT:    Voided (mL): 250 mL  Total OUT: 250 mL    Total NET: -250 mL  LABS:  MICROBIOLOGY: na  PATHOLOGY: na    ASSESSMENT:  61yM, BIBA as trauma B s/p fall from ladder (approx 25 feet), found to have multiple traumatic injuries including scattered SAH, L SDH, multiple facial fractures, including maxillary sinus and nasal bone fractures, Lefort II fracture, B/L distal radial fractures, possible L ICA dissection on CTA, and R posterior acetabular fracture.    PLAN:    Tracheostomy toilette.  PT and speech training.  DVT prophylaxis.  Pending disposition IWN Castillo maxilofacial recommendations and D/c plan.

## 2021-11-01 NOTE — PROGRESS NOTE ADULT - REASON FOR ADMISSION
Fall from 25 feet
s/p fall with polytrauma
trauma
ORIF frontal sinus fx and LeForte II fx
Patient is a being followed for bilateral distal radius fractures, splinted, right posterior acetabular fracture. Pt comfortable in bed at this time. Pt denies any numbness or tingling.
Polytrauma  fall  Covid +
fall from a ladder
lefort 2 fx, frontal sinus fx
fall from ladder 25 feet
polytrauma

## 2021-11-01 NOTE — PROGRESS NOTE ADULT - ATTENDING COMMENTS
seen and examined 11-01-21 @ 0920      left parietooccipital SDH  left Sylvian fissure SAH  -stable on 10/17 1824 repeat CT head    sphenoid fractures involving left carotid canal  -10/17 1252 CT angio was concerning for a left internal carotid artery injury in the carotid canal adjacent to petrous portion of temporal bone, but this injury was not seen on 10/19 0902 MRA    s/p ORIF of frontal sinus and bilateral LeFort II fractures on 10/20/2021  -remains in maxillomandibular fixation    s/p perc trach and PEG on 10/20/2021  -on T-piece (FiO2 = 21%)  -continue tube feeds    right distal radius fracture  s/p ORIF left distal radius fracture and right distal radius fracture on 10/21/2021  -NWB RUE / LUE    right posterior column / posterior wall acetabular fracture  -TTWB RLE x 4-6 weeks    posterior mediastinal hematoma  -no blunt thoracic aortic injury on CTA chest seen and examined 11-01-21 @ 0920    trach site clean  on T-piece (FiO2 = 21%)  PEG site clean  soft / NT / ND  bilateral wrists in volar splints    left parietooccipital SDH  left Sylvian fissure SAH  -stable on 10/17 1824 repeat CT head    sphenoid fractures involving left carotid canal  -10/17 1252 CT angio was concerning for a left internal carotid artery injury in the carotid canal adjacent to petrous portion of temporal bone, but this injury was not seen on 10/19 0902 MRA    s/p ORIF of frontal sinus and bilateral LeFort II fractures on 10/20/2021  -remains in maxillomandibular fixation    s/p perc trach and PEG on 10/20/2021  -ok to use speaking valve or cap trach  -keep trach since he continues to require suctioning  -continue tube feeds until he is tolerating adequate oral nutrition    right distal radius fracture  s/p ORIF left distal radius fracture and right distal radius fracture on 10/21/2021  -NWB RUE / LUE    right posterior column / posterior wall acetabular fracture  -TTWB RLE x 4-6 weeks    posterior mediastinal hematoma  -no blunt thoracic aortic injury on CTA chest

## 2021-11-02 LAB
GLUCOSE BLDC GLUCOMTR-MCNC: 114 MG/DL — HIGH (ref 70–99)
GLUCOSE BLDC GLUCOMTR-MCNC: 132 MG/DL — HIGH (ref 70–99)
GLUCOSE BLDC GLUCOMTR-MCNC: 160 MG/DL — HIGH (ref 70–99)
SARS-COV-2 RNA SPEC QL NAA+PROBE: SIGNIFICANT CHANGE UP

## 2021-11-02 PROCEDURE — 99232 SBSQ HOSP IP/OBS MODERATE 35: CPT

## 2021-11-02 RX ADMIN — Medication 250 MILLIGRAM(S): at 09:11

## 2021-11-02 RX ADMIN — QUETIAPINE FUMARATE 50 MILLIGRAM(S): 200 TABLET, FILM COATED ORAL at 22:50

## 2021-11-02 RX ADMIN — Medication 5 MILLIGRAM(S): at 22:50

## 2021-11-02 RX ADMIN — ENOXAPARIN SODIUM 40 MILLIGRAM(S): 100 INJECTION SUBCUTANEOUS at 11:46

## 2021-11-02 RX ADMIN — Medication 1: at 11:46

## 2021-11-02 RX ADMIN — Medication 500 MILLIGRAM(S): at 22:50

## 2021-11-02 RX ADMIN — Medication 5 MILLIGRAM(S): at 05:22

## 2021-11-02 RX ADMIN — OXYCODONE HYDROCHLORIDE 10 MILLIGRAM(S): 5 TABLET ORAL at 18:47

## 2021-11-02 NOTE — PROGRESS NOTE ADULT - ATTENDING COMMENTS
seen and examined 11-02-21 @ 1030    trach site clean  talking with Passy-Jermain valve in place  PEG site clean, no pressure with bumper @ 2 cm from skin  soft / NT / ND  bilateral wrists in volar splints    left parietooccipital SDH  left Sylvian fissure SAH  -stable on 10/17 1824 repeat CT head    sphenoid fractures involving left carotid canal  -10/17 1252 CT angio was concerning for a left internal carotid artery injury in the carotid canal adjacent to petrous portion of temporal bone, but this injury was not seen on 10/19 0902 MRA    s/p ORIF of frontal sinus and bilateral LeFort II fractures on 10/20/2021  -remains in maxillomandibular fixation    s/p perc trach and PEG on 10/20/2021  -ok to use speaking valve or cap trach  -keep trach since he continues to require suctioning  -continue tube feeds until he is tolerating adequate oral nutrition    right distal radius fracture  s/p ORIF left distal radius fracture and right distal radius fracture on 10/21/2021  -NWB RUE / LUE    right posterior column / posterior wall acetabular fracture  -TTWB RLE x 4-6 weeks    posterior mediastinal hematoma  -no blunt thoracic aortic injury on CTA chest      dispo  -DANIEL

## 2021-11-02 NOTE — PROGRESS NOTE ADULT - ASSESSMENT
ASSESSMENT:  61yM, BIBA as trauma B s/p fall from ladder (approx 25 feet), found to have multiple traumatic injuries including scattered SAH, L SDH, multiple facial fractures, including maxillary sinus and nasal bone fractures, Lefort II fracture, B/L distal radial fractures, possible L ICA dissection on CTA, and R posterior acetabular fracture.    PLAN:    Tracheostomy toilette.  PT and speech training  NWB to bl LE  DVT Prophylaxis: SCDs, lovenox  Pending disposition WIN Castillo maxilofacial recommendations and D/c plan.

## 2021-11-02 NOTE — CHART NOTE - NSCHARTNOTEFT_GEN_A_CORE
CTA findings suggestive of L ICA dissection but could also be streak artifact. Neuro interventional service consulted.
SICU TRANSFER NOTE  -----------------------------  ICU Admission Date: 10/17/21  Transfer Date: 10-22-21 @ 13:12    Admission Diagnosis: SDH, Lefor III fracture, b/l distal radius fracture, R posterior acetabular fracture.    Active Problems/injuries: recovering from Plated lefort fracture and b/l distal radial ORIF.    Procedures:    10/20: tracheostomy and PEG. Plated leforot fracture, wired.  10/21: b/l distal radial ORIF    Consultants:  [x] Ortho       [x] Weight Bearing Status: B/L UE NWB. B/L LE TTWB  [x ] Plastics    Medications  acetaminophen     Tablet .. 975 milliGRAM(s) Oral every 8 hours PRN  artificial  tears Solution 1 Drop(s) Both EYES daily  cephalexin 500 milliGRAM(s) Oral every 12 hours  chlorhexidine 0.12% Liquid 15 milliLiter(s) Oral Mucosa <User Schedule>  dexAMETHasone  Injectable 5 milliGRAM(s) IV Push every 8 hours  enalaprilat Injectable 1.25 milliGRAM(s) IV Push every 6 hours  enoxaparin Injectable 40 milliGRAM(s) SubCutaneous daily  fosphenytoin IVPB 150 milliGRAM(s) PE IV Intermittent every 12 hours  influenza   Vaccine 0.5 milliLiter(s) IntraMuscular once  insulin lispro (ADMELOG) corrective regimen sliding scale   SubCutaneous three times a day before meals  oxyCODONE    Solution 5 milliGRAM(s) Oral every 6 hours PRN  oxyCODONE    Solution 10 milliGRAM(s) Oral every 6 hours PRN  pantoprazole  Injectable 40 milliGRAM(s) IV Push daily  QUEtiapine 50 milliGRAM(s) Oral at bedtime        Antibiotics:  cephalexin 500 milliGRAM(s) Oral every 12 hours    Indication: Post operative prophylaxes End Date:10/29/21      I have discussed this case with Dr. Peña upon transfer and all questions regarding ICU course were answered.  The following items are to be followed up:  -f/u PT/OT: B/l LE TTWB; B/l UE NWB  -f/u TOV  -Plastics: f/u plan and as outpatient. Keflex x7 days  -f/u Ortho as outpatient and plan
Source: Patient [ ]  Family [ ]   other [ x]    Current Diet: Diet, NPO with Tube Feed:   Tube Feeding Modality: Gastrostomy  Jevity 1.5 Riccardo (JEVITY1.5)  Total Volume for 24 Hours (mL): 1080  Continuous  Starting Tube Feed Rate {mL per Hour}: 10  Increase Tube Feed Rate by (mL): 10     Every 8 hours  Until Goal Tube Feed Rate (mL per Hour): 45  Tube Feed Duration (in Hours): 24  Tube Feed Start Time: 19:00 (10-21-21 @ 18:30)    Enteral /Parenteral Nutrition: Jevity 1.5 at goal rate of 45 ml/hr (x20 hrs) to provide 900 ml, 1350 kcal, 57g protein, 684 ml free water, and 90% of RDIs for vitamins/minerals. Additional free water per MD discretion.     Current Weight:   (10/19) 155.2 lbs  (10/18) 153.2 lbs  -Obtain daily wts, continue to monitor. 2+ generalized edema noted.     Pertinent Medications: MEDICATIONS  (STANDING):  artificial  tears Solution 1 Drop(s) Both EYES daily  cephalexin 500 milliGRAM(s) Oral every 12 hours  chlorhexidine 0.12% Liquid 15 milliLiter(s) Oral Mucosa <User Schedule>  diVALproex  milliGRAM(s) Oral two times a day  enalapril 5 milliGRAM(s) Oral daily  enoxaparin Injectable 40 milliGRAM(s) SubCutaneous daily  influenza   Vaccine 0.5 milliLiter(s) IntraMuscular once  insulin lispro (ADMELOG) corrective regimen sliding scale   SubCutaneous three times a day before meals  melatonin 5 milliGRAM(s) Oral at bedtime  methylPREDNISolone   Oral   methylPREDNISolone 4 milliGRAM(s) Oral before breakfast  methylPREDNISolone 4 milliGRAM(s) Oral after lunch  methylPREDNISolone 4 milliGRAM(s) Oral after dinner  methylPREDNISolone 8 milliGRAM(s) Oral at bedtime  QUEtiapine 50 milliGRAM(s) Oral at bedtime    MEDICATIONS  (PRN):  acetaminophen     Tablet .. 975 milliGRAM(s) Oral every 8 hours PRN Mild Pain (1 - 3)  ALBUTerol    90 MICROgram(s) HFA Inhaler 2 Puff(s) Inhalation every 6 hours PRN Wheezing  oxyCODONE    Solution 5 milliGRAM(s) Oral every 6 hours PRN Moderate Pain (4 - 6)  oxyCODONE    Solution 10 milliGRAM(s) Oral every 6 hours PRN Severe Pain (7 - 10)    Pertinent Labs: no recent labs    Skin: forehead abrasion, lip abrasion     Nutrition focused physical exam conducted - found signs of malnutrition [ x]absent [ ]present    Subcutaneous fat loss: [ ] Orbital fat pads region, [ ]Buccal fat region, [ ]Triceps region,  [ ]Ribs region    Muscle wasting: [ ]Temples region, [ ]Clavicle region, [ ]Shoulder region, [ ]Scapula region, [ ]Interosseous region,  [ ]thigh region, [ ]Calf region    Estimated Needs:   [ ] no change since previous assessment  [ x] recalculated: based on 70kg    1750-2100kcal/day based on 25-30kcal/kg  84-98g/day based on 1.2-1.4g/kg    Current Nutrition Diagnosis: Pt remains at high nutrition risk secondary to Increased Nutrient Needs related to increased physiological demand for nutrient as evidenced by s/p fall from ladder sustaining multiple traumatic injuries. Patient trached and peg on 10/20, 10/21 ORIF distal radius fracture. Jevity 1.5 running at goal of 45ml/hr per I&Os. Noted with constipation. RD to remain available.     Recommendations:   1) As medically feasible increase goal rate to better meet pt protein/energy needs- advance 10 ml/hr q4 hrs until goal rate of 65 ml/hr (x20 hrs) to provide 1300 ml, 1950 kcal, 83g protein, 988 ml free water, and >100% of RDIs for vitamins/minerals. Additional free water per MD discretion.   2) RX: liquid MVI  3) Consider bowel regimen to aid in constipation   3) Monitor wts and labs    Monitoring and Evaluation:   [ ] PO intake [x ] Tolerance to diet prescription [X] Weights  [X] Follow up per protocol [X] Labs:
Source: Patient [ ]  Family [ ]   other [ x]  61yM, BIBA as trauma B s/p fall from ladder (approx 25 feet), found to have multiple traumatic injuries including scattered SAH, L SDH, multiple facial fractures, including maxillary sinus and nasal bone fractures, Lefort II fracture, B/L distal radial fractures, possible L ICA dissection on CTA, and R posterior acetabular fracture.    Current Diet: Diet, NPO with Tube Feed:   Tube Feeding Modality: Gastrostomy  Jevity 1.5 Riccardo (JEVITY1.5)  Total Volume for 24 Hours (mL): 1080  Continuous  Starting Tube Feed Rate {mL per Hour}: 10  Increase Tube Feed Rate by (mL): 10     Every 8 hours  Until Goal Tube Feed Rate (mL per Hour): 45  Tube Feed Duration (in Hours): 24  Tube Feed Start Time: 19:00 (10-21-21 @ 18:30)      Enteral /Parenteral Nutrition:  Jevity 1.5 at goal rate of 45 ml/hr (x20 hrs) to provide 900 ml, 1350 kcal, 57g protein, 684 ml free water, and 90% of RDIs for vitamins/minerals. Additional free water per MD discretion.     Current Weight:   (10/19) 155.2 lbs  (10/18) 153.2 lbs  -Obtain daily wts, continue to monitor.       Pertinent Medications: MEDICATIONS  (STANDING):  artificial  tears Solution 1 Drop(s) Both EYES daily  chlorhexidine 0.12% Liquid 15 milliLiter(s) Oral Mucosa <User Schedule>  enalapril 5 milliGRAM(s) Oral daily  enoxaparin Injectable 40 milliGRAM(s) SubCutaneous daily  influenza   Vaccine 0.5 milliLiter(s) IntraMuscular once  insulin lispro (ADMELOG) corrective regimen sliding scale   SubCutaneous three times a day before meals  melatonin 5 milliGRAM(s) Oral at bedtime  QUEtiapine 50 milliGRAM(s) Oral at bedtime  valproic  acid Syrup 250 milliGRAM(s) Oral <User Schedule>  valproic  acid Syrup 500 milliGRAM(s) Oral <User Schedule>    MEDICATIONS  (PRN):  ALBUTerol    90 MICROgram(s) HFA Inhaler 2 Puff(s) Inhalation every 6 hours PRN Wheezing  oxyCODONE    Solution 5 milliGRAM(s) Oral every 4 hours PRN Moderate Pain (4 - 6)  oxyCODONE    Solution 10 milliGRAM(s) Oral every 4 hours PRN Severe Pain (7 - 10)    Pertinent Labs:         Skin: forehead abrasion, lip abrasion     Nutrition focused physical exam conducted - found signs of malnutrition [ x]absent [ ]present    Subcutaneous fat loss: [ ] Orbital fat pads region, [ ]Buccal fat region, [ ]Triceps region,  [ ]Ribs region    Muscle wasting: [ ]Temples region, [ ]Clavicle region, [ ]Shoulder region, [ ]Scapula region, [ ]Interosseous region,  [ ]thigh region, [ ]Calf region      Current Nutrition Diagnosis: Pt remains at high nutrition risk secondary to Increased Nutrient Needs related to increased physiological demand for nutrient as evidenced by s/p fall from ladder sustaining multiple traumatic injuries. Patient trached and peg on 10/20, 10/21 ORIF distal radius fracture. Jevity 1.5 running at goal of 45ml/hr per I&Os.. RD to remain available.     Recommendations:   1) As medically feasible increase goal rate to better meet pt protein/energy needs- advance 10 ml/hr q4 hrs until goal rate of 65 ml/hr (x20 hrs) to provide 1300 ml, 1950 kcal, 83g protein, 988 ml free water, and >100% of RDIs for vitamins/minerals. Additional free water per MD discretion.   2) RX: liquid MVI  3) Daily weight        Monitoring and Evaluation:   [ ] PO intake [ ] Tolerance to diet prescription [X] Weights  [X] Follow up per protocol [X] Labs:
Tertiary Trauma Survey (TTS)    Date of TTS: 10-22-21 @ 08:51                             Admit Date: 10-17-21 @ 12:13      Trauma Activation: Trauma B      Subjective / 24 hour events:   Patient had b/l distal radial ORIF. Was on trache collar all night. Started on tube feeds this AM. Confused overnight, improved this AM.     Vital Signs Last 24 Hrs  T(C): 36.8 (22 Oct 2021 08:00), Max: 37.1 (21 Oct 2021 16:30)  T(F): 98.3 (22 Oct 2021 08:00), Max: 98.7 (21 Oct 2021 16:30)  HR: 74 (22 Oct 2021 08:00) (68 - 107)  BP: 137/77 (22 Oct 2021 08:00) (134/71 - 189/85)  BP(mean): 95 (22 Oct 2021 08:00) (88 - 113)  RR: 17 (22 Oct 2021 08:00) (10 - 22)  SpO2: 99% (22 Oct 2021 08:00) (92% - 100%)    Physical Exam:    Neuro: [ x] non focal neurological exam      HEENT: Frontal scalp laceration repaired and healing with sutures in place. Right cheek surgical wound with dressing and healing. Mandible wired after facial procedure.     Pulm/Chest:  [s ] CTA b/l . Tracheostomy in place on trache collar.     Cardiac: [x ] S1S2, sinus rhythm      GI / Abdomen: [x ] Soft, non-tender, non-distended. G tube in place with tube feeds going in.     Musculoskeletal / Extremities: b/l Upper extremities splinted and NWB.     Integumentary: Forehead laceration repaired and healing.         List Injuries Identified to Date:  Scattered SAH  L SDH  Lefort II fracture  Frontal bone fxr  b/l distal radial fxr  R posterior acetabular fxr      List Operative and Interventional Radiological Procedures:   10/20: Trache and G tube insertion. Plated Lefort wired.  10/21: b/l distal radial ORIF      Consults (Date):  [ x ] Orthopedics  [  x] Plastics      Other:
This is sa 65 M who is admitted due to a fall from a ladder about 25 feet high with a maxillofacial fractures and Leffort 2, who during straining for a BM disconnected himself from his tracheostomy. Tracheostomy is without a cuff. Team trauma arrived, and reposition the tracheostomy tube in place and patient tolerated well the procedure. BM regimen was added to patient therapy. No other details to report.

## 2021-11-02 NOTE — CHART NOTE - NSCHARTNOTESELECT_GEN_ALL_CORE
Tertiary Note/Event Note
Trauma/Event Note
Event Note
Nutrition Services
Nutrition Services
Transfer Note

## 2021-11-02 NOTE — PROGRESS NOTE ADULT - SUBJECTIVE AND OBJECTIVE BOX
Pt seen and in no acute distress.  No acute events overnight. Plan to d/c to [facility] rehab tomorrow 11/3/21         ICU Vital Signs Last 24 Hrs  T(C): 36.6 (02 Nov 2021 17:08), Max: 36.6 (02 Nov 2021 17:08)  T(F): 97.8 (02 Nov 2021 17:08), Max: 97.8 (02 Nov 2021 17:08)  HR: 78 (02 Nov 2021 17:08) (65 - 90)  BP: 116/70 (02 Nov 2021 17:08) (109/63 - 116/70)  BP(mean): --  ABP: --  ABP(mean): --  RR: 18 (02 Nov 2021 17:08) (18 - 18)  SpO2: 94% (02 Nov 2021 17:08) (94% - 100%)      Physical Exam  General: asleep, easily arousable, in no acute distress  HEENT: + trach, mmf in place in occlusion, wires sharp dental wax applied, dorsum midline, nares intact  Chest: equal chest rise

## 2021-11-02 NOTE — PROGRESS NOTE ADULT - ASSESSMENT
A: 60 y/o M s/p fall off a ladder and suffering multiple injuries including lefort 2 fx and frontal sinus  MMF w/ arch bars and wired    - Cont Peridex QID x 10 days, then transition to dilute Listerine   - Dental wax at the bedside to be placed PRN areas of contact of wires with gingiva/ inner lip  - plastics team will closely follow.  - Pt will f/u OP for wire removal   - please contact for any acute changes [phone]

## 2021-11-02 NOTE — PROGRESS NOTE ADULT - SUBJECTIVE AND OBJECTIVE BOX
INTERVAL HPI/OVERNIGHT EVENTS: Pt was lying in bed comfortably on arrival. Denies cp, sob, n/v, f/c, abdominal pain. Being fed through peg tube. No erythema surround port. Voiding.     MEDICATIONS  (STANDING):  artificial  tears Solution 1 Drop(s) Both EYES daily  chlorhexidine 0.12% Liquid 15 milliLiter(s) Oral Mucosa <User Schedule>  enalapril 5 milliGRAM(s) Oral daily  enoxaparin Injectable 40 milliGRAM(s) SubCutaneous daily  influenza   Vaccine 0.5 milliLiter(s) IntraMuscular once  insulin lispro (ADMELOG) corrective regimen sliding scale   SubCutaneous three times a day before meals  melatonin 5 milliGRAM(s) Oral at bedtime  QUEtiapine 50 milliGRAM(s) Oral at bedtime  valproic  acid Syrup 250 milliGRAM(s) Oral <User Schedule>  valproic  acid Syrup 500 milliGRAM(s) Oral <User Schedule>    MEDICATIONS  (PRN):  ALBUTerol    90 MICROgram(s) HFA Inhaler 2 Puff(s) Inhalation every 6 hours PRN Wheezing  oxyCODONE    Solution 5 milliGRAM(s) Oral every 4 hours PRN Moderate Pain (4 - 6)  oxyCODONE    Solution 10 milliGRAM(s) Oral every 4 hours PRN Severe Pain (7 - 10)      Vital Signs Last 24 Hrs  T(C): 36.9 (01 Nov 2021 17:01), Max: 36.9 (01 Nov 2021 04:56)  T(F): 98.5 (01 Nov 2021 17:01), Max: 98.5 (01 Nov 2021 17:01)  HR: 66 (01 Nov 2021 17:01) (66 - 71)  BP: 112/66 (01 Nov 2021 17:01) (107/67 - 127/65)  BP(mean): --  RR: 18 (01 Nov 2021 17:01) (18 - 18)  SpO2: 99% (01 Nov 2021 17:01) (99% - 100%)    Physical Exam:  General: well appearing, NAD  Head: NC, AT  EENT: EOMI, no scleral icterus  Cardiac: RRR, no apparent murmurs, no lower extremity edema  Respiratory: CTABL, no respiratory distress   Abdomen: soft, ND, NT, nonperitonitic  MSK/Vascular: full ROM, soft compartments, warm extremities  Neuro: AAOx3, sensation to light touch intact  Psych: calm, cooperative  GI: Peg tube in place. No inflammation or discharge from site.    I&O's Detail    31 Oct 2021 07:01  -  01 Nov 2021 07:00  --------------------------------------------------------  IN:  Total IN: 0 mL    OUT:    Voided (mL): 1000 mL  Total OUT: 1000 mL    Total NET: -1000 mL      01 Nov 2021 07:01  -  02 Nov 2021 01:00  --------------------------------------------------------  IN:  Total IN: 0 mL    OUT:    Voided (mL): 625 mL  Total OUT: 625 mL    Total NET: -625 mL          LABS:                RADIOLOGY & ADDITIONAL STUDIES:

## 2021-11-03 ENCOUNTER — TRANSCRIPTION ENCOUNTER (OUTPATIENT)
Age: 61
End: 2021-11-03

## 2021-11-03 VITALS
DIASTOLIC BLOOD PRESSURE: 74 MMHG | TEMPERATURE: 98 F | OXYGEN SATURATION: 98 % | HEART RATE: 79 BPM | RESPIRATION RATE: 18 BRPM | SYSTOLIC BLOOD PRESSURE: 128 MMHG

## 2021-11-03 LAB — GLUCOSE BLDC GLUCOMTR-MCNC: 180 MG/DL — HIGH (ref 70–99)

## 2021-11-03 PROCEDURE — 72170 X-RAY EXAM OF PELVIS: CPT

## 2021-11-03 PROCEDURE — 99285 EMERGENCY DEPT VISIT HI MDM: CPT

## 2021-11-03 PROCEDURE — 36415 COLL VENOUS BLD VENIPUNCTURE: CPT

## 2021-11-03 PROCEDURE — 73120 X-RAY EXAM OF HAND: CPT

## 2021-11-03 PROCEDURE — 70450 CT HEAD/BRAIN W/O DYE: CPT | Mod: ME

## 2021-11-03 PROCEDURE — 82040 ASSAY OF SERUM ALBUMIN: CPT

## 2021-11-03 PROCEDURE — 85027 COMPLETE CBC AUTOMATED: CPT

## 2021-11-03 PROCEDURE — U0005: CPT

## 2021-11-03 PROCEDURE — 96367 TX/PROPH/DG ADDL SEQ IV INF: CPT

## 2021-11-03 PROCEDURE — 86850 RBC ANTIBODY SCREEN: CPT

## 2021-11-03 PROCEDURE — 86900 BLOOD TYPING SEROLOGIC ABO: CPT

## 2021-11-03 PROCEDURE — 82553 CREATINE MB FRACTION: CPT

## 2021-11-03 PROCEDURE — 70486 CT MAXILLOFACIAL W/O DYE: CPT | Mod: MD

## 2021-11-03 PROCEDURE — 73590 X-RAY EXAM OF LOWER LEG: CPT

## 2021-11-03 PROCEDURE — 73070 X-RAY EXAM OF ELBOW: CPT

## 2021-11-03 PROCEDURE — 73030 X-RAY EXAM OF SHOULDER: CPT

## 2021-11-03 PROCEDURE — 99232 SBSQ HOSP IP/OBS MODERATE 35: CPT

## 2021-11-03 PROCEDURE — 84132 ASSAY OF SERUM POTASSIUM: CPT

## 2021-11-03 PROCEDURE — C1713: CPT

## 2021-11-03 PROCEDURE — 73100 X-RAY EXAM OF WRIST: CPT

## 2021-11-03 PROCEDURE — 96365 THER/PROPH/DIAG IV INF INIT: CPT

## 2021-11-03 PROCEDURE — 82435 ASSAY OF BLOOD CHLORIDE: CPT

## 2021-11-03 PROCEDURE — U0003: CPT

## 2021-11-03 PROCEDURE — 73562 X-RAY EXAM OF KNEE 3: CPT

## 2021-11-03 PROCEDURE — 80053 COMPREHEN METABOLIC PANEL: CPT

## 2021-11-03 PROCEDURE — 93005 ELECTROCARDIOGRAM TRACING: CPT

## 2021-11-03 PROCEDURE — 80048 BASIC METABOLIC PNL TOTAL CA: CPT

## 2021-11-03 PROCEDURE — 73130 X-RAY EXAM OF HAND: CPT

## 2021-11-03 PROCEDURE — 85610 PROTHROMBIN TIME: CPT

## 2021-11-03 PROCEDURE — 82947 ASSAY GLUCOSE BLOOD QUANT: CPT

## 2021-11-03 PROCEDURE — 82330 ASSAY OF CALCIUM: CPT

## 2021-11-03 PROCEDURE — 82962 GLUCOSE BLOOD TEST: CPT

## 2021-11-03 PROCEDURE — 81001 URINALYSIS AUTO W/SCOPE: CPT

## 2021-11-03 PROCEDURE — 83735 ASSAY OF MAGNESIUM: CPT

## 2021-11-03 PROCEDURE — 97167 OT EVAL HIGH COMPLEX 60 MIN: CPT

## 2021-11-03 PROCEDURE — 83036 HEMOGLOBIN GLYCOSYLATED A1C: CPT

## 2021-11-03 PROCEDURE — 70549 MR ANGIOGRAPH NECK W/O&W/DYE: CPT

## 2021-11-03 PROCEDURE — 74177 CT ABD & PELVIS W/CONTRAST: CPT | Mod: MA

## 2021-11-03 PROCEDURE — 84100 ASSAY OF PHOSPHORUS: CPT

## 2021-11-03 PROCEDURE — 72125 CT NECK SPINE W/O DYE: CPT | Mod: MA

## 2021-11-03 PROCEDURE — 73110 X-RAY EXAM OF WRIST: CPT

## 2021-11-03 PROCEDURE — 94799 UNLISTED PULMONARY SVC/PX: CPT

## 2021-11-03 PROCEDURE — C1889: CPT

## 2021-11-03 PROCEDURE — G1004: CPT

## 2021-11-03 PROCEDURE — 94002 VENT MGMT INPAT INIT DAY: CPT

## 2021-11-03 PROCEDURE — 92610 EVALUATE SWALLOWING FUNCTION: CPT

## 2021-11-03 PROCEDURE — 86803 HEPATITIS C AB TEST: CPT

## 2021-11-03 PROCEDURE — 86769 SARS-COV-2 COVID-19 ANTIBODY: CPT

## 2021-11-03 PROCEDURE — 97112 NEUROMUSCULAR REEDUCATION: CPT

## 2021-11-03 PROCEDURE — T1013: CPT

## 2021-11-03 PROCEDURE — 80307 DRUG TEST PRSMV CHEM ANLYZR: CPT

## 2021-11-03 PROCEDURE — 85014 HEMATOCRIT: CPT

## 2021-11-03 PROCEDURE — 86901 BLOOD TYPING SEROLOGIC RH(D): CPT

## 2021-11-03 PROCEDURE — 70544 MR ANGIOGRAPHY HEAD W/O DYE: CPT

## 2021-11-03 PROCEDURE — 70496 CT ANGIOGRAPHY HEAD: CPT | Mod: MA

## 2021-11-03 PROCEDURE — 82550 ASSAY OF CK (CPK): CPT

## 2021-11-03 PROCEDURE — 85025 COMPLETE CBC W/AUTO DIFF WBC: CPT

## 2021-11-03 PROCEDURE — 85730 THROMBOPLASTIN TIME PARTIAL: CPT

## 2021-11-03 PROCEDURE — 85018 HEMOGLOBIN: CPT

## 2021-11-03 PROCEDURE — 73552 X-RAY EXAM OF FEMUR 2/>: CPT

## 2021-11-03 PROCEDURE — 83690 ASSAY OF LIPASE: CPT

## 2021-11-03 PROCEDURE — 87635 SARS-COV-2 COVID-19 AMP PRB: CPT

## 2021-11-03 PROCEDURE — 70498 CT ANGIOGRAPHY NECK: CPT | Mod: MA

## 2021-11-03 PROCEDURE — 94640 AIRWAY INHALATION TREATMENT: CPT

## 2021-11-03 PROCEDURE — 70551 MRI BRAIN STEM W/O DYE: CPT

## 2021-11-03 PROCEDURE — 73200 CT UPPER EXTREMITY W/O DYE: CPT

## 2021-11-03 PROCEDURE — L8501: CPT

## 2021-11-03 PROCEDURE — 71045 X-RAY EXAM CHEST 1 VIEW: CPT

## 2021-11-03 PROCEDURE — 97535 SELF CARE MNGMENT TRAINING: CPT

## 2021-11-03 PROCEDURE — 97530 THERAPEUTIC ACTIVITIES: CPT

## 2021-11-03 PROCEDURE — 73700 CT LOWER EXTREMITY W/O DYE: CPT

## 2021-11-03 PROCEDURE — 80185 ASSAY OF PHENYTOIN TOTAL: CPT

## 2021-11-03 PROCEDURE — 83605 ASSAY OF LACTIC ACID: CPT

## 2021-11-03 PROCEDURE — 82803 BLOOD GASES ANY COMBINATION: CPT

## 2021-11-03 PROCEDURE — 73090 X-RAY EXAM OF FOREARM: CPT

## 2021-11-03 PROCEDURE — 94760 N-INVAS EAR/PLS OXIMETRY 1: CPT

## 2021-11-03 PROCEDURE — 71260 CT THORAX DX C+: CPT | Mod: MA

## 2021-11-03 PROCEDURE — 84295 ASSAY OF SERUM SODIUM: CPT

## 2021-11-03 RX ADMIN — CHLORHEXIDINE GLUCONATE 15 MILLILITER(S): 213 SOLUTION TOPICAL at 12:22

## 2021-11-03 RX ADMIN — OXYCODONE HYDROCHLORIDE 10 MILLIGRAM(S): 5 TABLET ORAL at 07:56

## 2021-11-03 RX ADMIN — CHLORHEXIDINE GLUCONATE 15 MILLILITER(S): 213 SOLUTION TOPICAL at 04:47

## 2021-11-03 RX ADMIN — Medication 250 MILLIGRAM(S): at 09:48

## 2021-11-03 RX ADMIN — ENOXAPARIN SODIUM 40 MILLIGRAM(S): 100 INJECTION SUBCUTANEOUS at 12:14

## 2021-11-03 RX ADMIN — CHLORHEXIDINE GLUCONATE 15 MILLILITER(S): 213 SOLUTION TOPICAL at 01:02

## 2021-11-03 RX ADMIN — Medication 1 DROP(S): at 12:14

## 2021-11-03 NOTE — PROGRESS NOTE ADULT - ASSESSMENT
61yM, BIBA as trauma B s/p fall from ladder (approx 25 feet), found to have multiple traumatic injuries including scattered SAH, L SDH, multiple facial fractures, including maxillary sinus and nasal bone fractures, Lefort II fracture, B/L distal radial fractures, possible L ICA dissection on CTA, and R posterior acetabular fracture.    PLAN:    Tracheostomy toilette.  PT and speech training  continue tube feeding via peg tube  NWB to bl LE  DVT Prophylaxis: SCDs, lovenox  dental wax to wires  plastics to arrange for patient to come to their office for wire removal when at the rehab  Pending disposition DANIEL.

## 2021-11-03 NOTE — PROGRESS NOTE ADULT - ATTENDING SUPERVISION STATEMENT
ACP
Fellow
ACP
Resident
Resident
ACP/Resident
Resident
ACP
ACP/Resident
Resident
Resident
ACP/Resident
ACP/Resident

## 2021-11-03 NOTE — DISCHARGE NOTE NURSING/CASE MANAGEMENT/SOCIAL WORK - PATIENT PORTAL LINK FT
You can access the FollowMyHealth Patient Portal offered by [facility] by registering at the following website: [email or web address]. By joining [facility]’s FollowMyHealth portal, you will also be able to view your health information using other applications (apps) compatible with our system.

## 2021-11-03 NOTE — PROGRESS NOTE ADULT - PROVIDER SPECIALTY LIST ADULT
Brain Injury Medicine
Orthopedics
SICU
Trauma Surgery
Intervent Radiology
Neurosurgery
Neurosurgery
Orthopedics
Plastic Surgery
SICU
Surgery
Surgery
Trauma Surgery
Orthopedics
Plastic Surgery
Plastic Surgery
Trauma Surgery
Neurosurgery
Plastic Surgery
Surgery

## 2021-11-03 NOTE — PROGRESS NOTE ADULT - ATTENDING COMMENTS
seen and examined 11-03-21 @ 1005    trach site clean  talking with Passy-Jermain valve in place  PEG site clean  I loosened to bumper to 2.5 cm from skin today in order to avoid ulceration into the gastric wall.  soft / NT / ND  bilateral wrists in volar splints    left parietooccipital SDH  left Sylvian fissure SAH  -stable on 10/17 1824 repeat CT head    sphenoid fractures involving left carotid canal  -10/17 1252 CT angio was concerning for a left internal carotid artery injury in the carotid canal adjacent to petrous portion of temporal bone, but this injury was not seen on 10/19 0902 MRA    s/p ORIF of frontal sinus and bilateral LeFort II fractures on 10/20/2021  -remains in maxillomandibular fixation    s/p perc trach and PEG on 10/20/2021  -ok to use speaking valve or cap trach  -keep trach since he continues to require suctioning  -continue tube feeds until he is tolerating adequate oral nutrition    right distal radius fracture  s/p ORIF left distal radius fracture and right distal radius fracture on 10/21/2021  -NWB RUE / LUE    right posterior column / posterior wall acetabular fracture  -TTWB RLE x 4-6 weeks    posterior mediastinal hematoma  -no blunt thoracic aortic injury on CTA chest      dispo  -DANIEL

## 2021-11-03 NOTE — PROGRESS NOTE ADULT - SUBJECTIVE AND OBJECTIVE BOX
SUBJECTIVE/24 hour events: Patient is a 62 y/o M with no acute events overnight. Denies cp, sob, n/v, f/c, abdominal pain. Being fed through peg tube. No erythema surround port. Voiding. Plastics at bedside for followup on wires, patient agreed he is feeling better with the dental wax on the wires       Vital Signs Last 24 Hrs  T(C): 36.5 (02 Nov 2021 21:40), Max: 36.6 (02 Nov 2021 17:08)  T(F): 97.7 (02 Nov 2021 21:40), Max: 97.8 (02 Nov 2021 17:08)  HR: 93 (02 Nov 2021 21:40) (65 - 93)  BP: 112/64 (02 Nov 2021 21:40) (109/63 - 116/70)  BP(mean): --  RR: 18 (02 Nov 2021 21:40) (18 - 18)  SpO2: 93% (02 Nov 2021 21:40) (93% - 100%)  Drug Dosing Weight  Height (cm): 160 (19 Oct 2021 16:22)  Weight (kg): 67 (19 Oct 2021 16:22)  BMI (kg/m2): 26.2 (19 Oct 2021 16:22)  BSA (m2): 1.7 (19 Oct 2021 16:22)  I&O's Detail    01 Nov 2021 07:01  -  02 Nov 2021 07:00  --------------------------------------------------------  IN:  Total IN: 0 mL    OUT:    Voided (mL): 625 mL  Total OUT: 625 mL    Total NET: -625 mL      02 Nov 2021 07:01  -  03 Nov 2021 01:07  --------------------------------------------------------  IN:  Total IN: 0 mL    OUT:    Voided (mL): 620 mL  Total OUT: 620 mL    Total NET: -620 mL        Allergies    No Known Allergies    Intolerances                ROS:    PHYSICAL EXAM:  Physical Exam:  General: well appearing, NAD  EENT: mouth wires well seated  Respiratory: CTABL, no respiratory distress   Abdomen: soft, ND, NT, nonperitonitic  MSK/Vascular: full ROM, soft compartments, warm extremities  Neuro: AAOx3, sensation to light touch intact  Psych: calm, cooperative  GI: Peg tube in place. No inflammation or discharge from site.      MEDICATIONS  (STANDING):  artificial  tears Solution 1 Drop(s) Both EYES daily  chlorhexidine 0.12% Liquid 15 milliLiter(s) Oral Mucosa <User Schedule>  enalapril 5 milliGRAM(s) Oral daily  enoxaparin Injectable 40 milliGRAM(s) SubCutaneous daily  influenza   Vaccine 0.5 milliLiter(s) IntraMuscular once  insulin lispro (ADMELOG) corrective regimen sliding scale   SubCutaneous three times a day before meals  melatonin 5 milliGRAM(s) Oral at bedtime  QUEtiapine 50 milliGRAM(s) Oral at bedtime  valproic  acid Syrup 250 milliGRAM(s) Oral <User Schedule>  valproic  acid Syrup 500 milliGRAM(s) Oral <User Schedule>    MEDICATIONS  (PRN):  ALBUTerol    90 MICROgram(s) HFA Inhaler 2 Puff(s) Inhalation every 6 hours PRN Wheezing  oxyCODONE    Solution 5 milliGRAM(s) Oral every 4 hours PRN Moderate Pain (4 - 6)  oxyCODONE    Solution 10 milliGRAM(s) Oral every 4 hours PRN Severe Pain (7 - 10)      RADIOLOGY STUDIES:    CULTURES:

## 2021-11-18 ENCOUNTER — EMERGENCY (EMERGENCY)
Facility: HOSPITAL | Age: 61
LOS: 1 days | Discharge: HOME | End: 2021-11-18
Attending: STUDENT IN AN ORGANIZED HEALTH CARE EDUCATION/TRAINING PROGRAM
Payer: MEDICAID

## 2021-11-18 VITALS
HEART RATE: 58 BPM | TEMPERATURE: 98 F | OXYGEN SATURATION: 100 % | WEIGHT: 143.08 LBS | DIASTOLIC BLOOD PRESSURE: 76 MMHG | RESPIRATION RATE: 20 BRPM | HEIGHT: 63 IN | SYSTOLIC BLOOD PRESSURE: 118 MMHG

## 2021-11-18 VITALS
DIASTOLIC BLOOD PRESSURE: 63 MMHG | OXYGEN SATURATION: 100 % | RESPIRATION RATE: 20 BRPM | TEMPERATURE: 98 F | HEART RATE: 56 BPM | SYSTOLIC BLOOD PRESSURE: 104 MMHG

## 2021-11-18 PROBLEM — Z78.9 OTHER SPECIFIED HEALTH STATUS: Chronic | Status: ACTIVE | Noted: 2021-10-17

## 2021-11-18 PROCEDURE — 99284 EMERGENCY DEPT VISIT MOD MDM: CPT | Mod: 25

## 2021-11-18 PROCEDURE — 99284 EMERGENCY DEPT VISIT MOD MDM: CPT

## 2021-11-18 PROCEDURE — 72170 X-RAY EXAM OF PELVIS: CPT

## 2021-11-18 PROCEDURE — 72170 X-RAY EXAM OF PELVIS: CPT | Mod: 26

## 2021-11-18 PROCEDURE — 73110 X-RAY EXAM OF WRIST: CPT | Mod: 26,50

## 2021-11-18 PROCEDURE — 73110 X-RAY EXAM OF WRIST: CPT

## 2021-11-18 RX ORDER — IBUPROFEN 200 MG
400 TABLET ORAL ONCE
Refills: 0 | Status: COMPLETED | OUTPATIENT
Start: 2021-11-18 | End: 2021-11-18

## 2021-11-18 RX ORDER — OXYCODONE HYDROCHLORIDE 5 MG/1
5 TABLET ORAL ONCE
Refills: 0 | Status: DISCONTINUED | OUTPATIENT
Start: 2021-11-18 | End: 2021-11-18

## 2021-11-18 RX ORDER — ACETAMINOPHEN 500 MG
975 TABLET ORAL ONCE
Refills: 0 | Status: COMPLETED | OUTPATIENT
Start: 2021-11-18 | End: 2021-11-18

## 2021-11-18 RX ADMIN — Medication 975 MILLIGRAM(S): at 15:06

## 2021-11-18 RX ADMIN — Medication 400 MILLIGRAM(S): at 17:45

## 2021-11-18 RX ADMIN — OXYCODONE HYDROCHLORIDE 5 MILLIGRAM(S): 5 TABLET ORAL at 15:23

## 2021-11-18 NOTE — ED PROVIDER NOTE - PROGRESS NOTE DETAILS
spoke with Dr. Cisneros who states he was scheduled to have wires removed today but was sent to ED instead. they will reach out to pt/pt's family to reschedule. pt is afebrile without any evidence of dislocation or infection. will discharge with instructions to f/u with plastics. spoke with Dr. Webb who requests bilateral wrist xrays and ortho evaluation prior to discharge pt was evaluated by ortho and had xrays which revealed that pt still requires splint in place and sutures were removed. will discharge patient back to rehab facility at this time ([facility]). discussed return precautions and need for outpatient follow up.

## 2021-11-18 NOTE — ED PROVIDER NOTE - OBJECTIVE STATEMENT
60yo man with extensive injuries after fall 5 weeks ago including lefort fracture s/p repair and wiring of jaw presents with bilateral jaw pain today. He has been taking oxycodone as needed without any other medications. He denies fevers or changes in skin. No difficulty breathing.   Pt has trach and PEG in place and bilateral arms in splints.

## 2021-11-18 NOTE — CHART NOTE - NSCHARTNOTEFT_GEN_A_CORE
SW Note: Earlier this evening SW was alerted pt is medically stable to return to [facility]. SW spoke to Prerna in admissions, pt accepted back, requested clinicals be forwarded via ACM. Transport arranged via [facility] ([place]). Shortly after SW was alerted to put d/c on hold due to need for ortho.     SW now alerted otho has seen pt, pt cleared for d/c, will need transport re-arranged. Trip re-arranged via [facility] ([place]), trip sent to [facility], next available pickup is 8pm. [facility] and [facility] billing letter to be left on unit, no further SW services identified at this time SW Note: Earlier this evening SW was alerted pt is medically stable to return to [facility]. SW spoke to Prerna in admissions, pt accepted back, requested clinicals be forwarded via ACM. Per CM, pt needs regular BLS for transport, although trached no suctioning or O2 required. Transport arranged via [facility] ([place]). Shortly after SW was alerted to put d/c on hold due to need for ortho.     SW now alerted otho has seen pt, pt cleared for d/c, will need transport re-arranged. Trip re-arranged via [facility] ([place]), trip sent to [facility], next available pickup is 8pm. [facility] and [facility] billing letter to be left on unit, no further SW services identified at this time RAJ Note: Earlier this evening SW was alerted pt is medically stable to return to [facility]. SW spoke to Prerna in admissions, pt accepted back, requested clinicals be forwarded via ACM. Per CM, pt needs regular BLS for transport, no ALS required, although pt with trach collar no suctioning or O2 required. Transport arranged via [facility] ([place]). Shortly after SW was alerted to put d/c on hold due to need for ortho.     SW now alerted ot has seen pt, pt cleared for d/c, will need transport re-arranged. Trip re-arranged via [facility] ([place]), trip sent to [facility], next available pickup is 8pm. [facility] and [facility] billing letter to be left on unit, no further SW services identified at this time

## 2021-11-18 NOTE — ED ADULT NURSE NOTE - CHIEF COMPLAINT QUOTE
pt arrives by ambulance from [facility] rehab with c/o jaw pain, pt at rehab s/p traumatic fall with multiple injuries including bilat wrist, hip, jaw. arrives with jaw wired shut, trach present.

## 2021-11-18 NOTE — ED ADULT NURSE NOTE - OBJECTIVE STATEMENT
Patient with jaw pain related to wiring for traumatic injury.  patient is alert and oriented x4, trach in place with oxygen, peg tube in place, no residual. medicated for pain. ortho called to bedside for follow up for wrist injury and suture removal.

## 2021-11-18 NOTE — ED PROVIDER NOTE - PATIENT PORTAL LINK FT
You can access the FollowMyHealth Patient Portal offered by [facility] by registering at the following website: [email or web address]. By joining [employer]’s FollowMyHealth portal, you will also be able to view your health information using other applications (apps) compatible with our system. You can access the FollowMyHealth Patient Portal offered by [facility] by registering at the following website: [email or web address]. By joining [employer]’s FollowMyHealth portal, you will also be able to view your health information using other applications (apps) compatible with our system.

## 2021-11-18 NOTE — ED PROVIDER NOTE - NS ED ROS FT
General: no fever  Head: no headache, +jaw pain  Eyes: no vision change  ENT: no nasal discharge/congestion  CV: no chest pain  Resp: no SOB, no cough  GI: no N/V, no abdominal pain  MSK: +bilateral wrist splints, +bilateral knee pain  Skin: no new rash  Neuro: no focal weakness, no change in sensation

## 2021-11-18 NOTE — ED PROVIDER NOTE - PHYSICAL EXAMINATION
General: middle-aged man in no acute distress  Head: normocephalic, atraumatic  Eyes: PERRL   Mouth: jaw is wired shut, no tenderness to palpation along gum lines with no obvious mucosal injuries, tender to palpation of bilateral TMJ and maxillary region  Neck: supple neck  CV: normal rate and rhythm, no LE edema  Respiratory: clear to auscultation bilaterally  Abdomen: soft, nontender, nondistended, PEG tube in place  MSK: bilateral wrists in splints  Neuro: alert and oriented x3,, moving all extremities spontaneously without difficulty  Skin: no swelling, erythema, warmth noted along jaw line

## 2021-11-18 NOTE — ED ADULT TRIAGE NOTE - CHIEF COMPLAINT QUOTE
pt arrives by ambulance from [facility] rehab with c/o jaw pain, pt at rehab s/p traumatic fall with multiple injuries including bilat wrist, hip, jaw. arrives with jaw wired shut, trach present.

## 2021-11-18 NOTE — PROGRESS NOTE ADULT - SUBJECTIVE AND OBJECTIVE BOX
Ortho Post Op Check    Name: JOAN ESTRELLA    MR #: [id number]    Procedure: Bilateral wrist ORIF on 10/21/21 4 weeks post op  Surgeon: Dr Webb     Pt comfortable without complaints, pain controlled  Denies CP, SOB, N/V, numbness/tingling     General Exam:  Vital Signs Last 24 Hrs  T(C): 36.6 (11-18-21 @ 17:35), Max: 36.6 (11-18-21 @ 13:33)  T(F): 97.9 (11-18-21 @ 17:35), Max: 97.9 (11-18-21 @ 13:33)  HR: 56 (11-18-21 @ 17:35) (56 - 58)  BP: 104/63 (11-18-21 @ 17:35) (104/63 - 118/76)  BP(mean): --  RR: 20 (11-18-21 @ 17:35) (20 - 20)  SpO2: 100% (11-18-21 @ 17:35) (100% - 100%)    General: Pt Alert and oriented, NAD, controlled pain.  Ace wrap dressings removed to reveal sutures volar aspect bilateral wrists. No bleeding noted.  Sutures removed steri strips applied. Cock up wrist braces placed back on   Pulses: 2+ radial pulse. Cap refill < 2 sec.  Sensation: Grossly intact to light touch without deficit.  Motor: Finger dexterity intact.          X-Ray: Bilateral wrists reveal intact hardware, fx in acceptable alignment minimal bony callous noted    A/P: 61yMale POD 4 weeks from Bilateral wrist ORIF  - Stable  - Weight bearing status:  Gentle PROM of bilateral wrists. OT for dexterity   - Continue with braces   - Follow up with Dr Webb in 4 weeks in office

## 2021-11-18 NOTE — ED PROVIDER NOTE - CARE PROVIDER_API CALL
Esther Cisneros)  Plastic Surgery  [address] [place] [address]  Phone: [phone]  Fax: [phone]  Follow Up Time:

## 2021-11-18 NOTE — ED PROVIDER NOTE - NSFOLLOWUPINSTRUCTIONS_ED_ALL_ED_FT
You were seen and evaluated in the emergency room for jaw pain.    Please follow up with your plastic surgeon to have wires removed and examined.    Continue all home medications as prescribed.    Seek immediate medical attention for any worsening, new, or concerning symptoms.    Please read all attached. You were seen and evaluated in the emergency room for jaw pain.    Please follow up with your plastic surgeon to have wires removed and examined.  Your appointment is on 11/23 at 12pm.    Continue all home medications as prescribed.    Seek immediate medical attention for any worsening, new, or concerning symptoms.    Please read all attached.

## 2021-11-18 NOTE — ED PROVIDER NOTE - CLINICAL SUMMARY MEDICAL DECISION MAKING FREE TEXT BOX
62yo man presents with bilateral jaw pain today in setting of wired jaw due to traumatic injuries about 5 weeks ago and sent from rehab facility for evaluation. No hx of fevers or evidence of abscess or infectious signs on exam. Jaw is aligned. Will give pain medication and reach out to plastics for close outpt f/u.
wheezes

## 2021-12-07 PROBLEM — Z00.00 ENCOUNTER FOR PREVENTIVE HEALTH EXAMINATION: Status: ACTIVE | Noted: 2021-12-07

## 2021-12-09 DIAGNOSIS — S32.409A UNSPECIFIED FRACTURE OF UNSPECIFIED ACETABULUM, INITIAL ENCOUNTER FOR CLOSED FRACTURE: ICD-10-CM

## 2021-12-14 ENCOUNTER — APPOINTMENT (OUTPATIENT)
Age: 61
End: 2021-12-14
Payer: MEDICAID

## 2021-12-14 VITALS
WEIGHT: 140 LBS | BODY MASS INDEX: 24.8 KG/M2 | TEMPERATURE: 98.1 F | HEART RATE: 64 BPM | DIASTOLIC BLOOD PRESSURE: 75 MMHG | HEIGHT: 63 IN | SYSTOLIC BLOOD PRESSURE: 124 MMHG

## 2021-12-14 DIAGNOSIS — S32.444A: ICD-10-CM

## 2021-12-14 DIAGNOSIS — M25.561 PAIN IN RIGHT KNEE: ICD-10-CM

## 2021-12-14 DIAGNOSIS — M25.551 PAIN IN RIGHT HIP: ICD-10-CM

## 2021-12-14 PROCEDURE — 72170 X-RAY EXAM OF PELVIS: CPT | Mod: RT

## 2021-12-14 PROCEDURE — 99213 OFFICE O/P EST LOW 20 MIN: CPT | Mod: 25

## 2021-12-14 PROCEDURE — 73560 X-RAY EXAM OF KNEE 1 OR 2: CPT | Mod: RT

## 2021-12-14 NOTE — DISCUSSION/SUMMARY
[de-identified] : 61-year-old male with a right acetabular fracture and right knee pain.  The acetabulum fracture is nondisplaced and will likely continue to heal well without any operative intervention.  I would allow him to progress to weightbearing as tolerated without restriction.  The knee shows no acute findings and I have no restrictions for that either.  He can continue to increase his activity as tolerated.  He should follow-up with his other surgeons to discuss any restrictions that they may have.  No orthopedic trauma intervention is required but we will help facilitate future care as needed. The patient may follow up as needed.\par \par The patient was given the opportunity to ask questions and all questions were answered to their satisfaction.\par \par Hadley Lozoya MD\par Orthopaedic Trauma Surgeon\par [facility]\par [facility] [place]\par Director Orthopaedic Trauma, [facility]\par \par \par \par

## 2021-12-14 NOTE — PHYSICAL EXAM
[de-identified] : Physical Exam:\par General: Well appearing, no acute distress, A&O\par Neurologic: A&Ox3, No focal deficits\par Head: NCAT without abrasions, lacerations, or ecchymosis to head, face, or scalp\par Respiratory: Equal chest wall expansion bilaterally, no accessory muscle use\par Lymphatic: No lymphadenopathy palpated\par Skin: Warm and dry\par Psychiatric: Normal mood and affect\par \par RLE:\par SILT s/s/sp/dp/t\par Fires EHL/FHL/GS/TA\par 2+ DP/PT pulse\par brisk capillary refill\par  [de-identified] : Plain films of the pelvis and right knee were obtained today.  There is a minimally displaced acetabulum fracture on the right with early healing.  The right knee shows no acute fracture, subluxation or dislocation.

## 2021-12-14 NOTE — HISTORY OF PRESENT ILLNESS
[de-identified] : The patient is a pleasant 7-year-old gentleman who presents with his son today for evaluation of right hip and right knee pain.  He suffered an injury and was admitted to the hospital for some time.  A minimally displaced right acetabulum fracture was diagnosed.  He was also diagnosed with Covid, wrist fractures as well as multiple facial fractures.  He has had several surgeries including a tracheostomy, ORIF of his distal radius and ORIF of his facial fractures.  This is his first outpatient visit.  He is ambulating without assistive devices.  He has minimal pain in the hip or knee.  The patient states the pain is made worse with activity and relieved with rest.  Aching, 1 out of 10. [Bending] : worsened by bending [Lifting] : worsened by lifting [Weight Bearing] : worsened by weight bearing [Recumbency] : relieved by recumbency [Rest] : relieved by rest

## 2021-12-30 ENCOUNTER — EMERGENCY (EMERGENCY)
Facility: HOSPITAL | Age: 61
LOS: 1 days | Discharge: HOME | End: 2021-12-30
Attending: EMERGENCY MEDICINE
Payer: MEDICAID

## 2021-12-30 VITALS
WEIGHT: 141.98 LBS | HEIGHT: 63 IN | DIASTOLIC BLOOD PRESSURE: 78 MMHG | SYSTOLIC BLOOD PRESSURE: 121 MMHG | TEMPERATURE: 98 F | RESPIRATION RATE: 16 BRPM | HEART RATE: 78 BPM | OXYGEN SATURATION: 97 %

## 2021-12-30 PROCEDURE — 74018 RADEX ABDOMEN 1 VIEW: CPT

## 2021-12-30 PROCEDURE — 74018 RADEX ABDOMEN 1 VIEW: CPT | Mod: 26

## 2021-12-30 PROCEDURE — 99284 EMERGENCY DEPT VISIT MOD MDM: CPT

## 2021-12-30 PROCEDURE — 99283 EMERGENCY DEPT VISIT LOW MDM: CPT | Mod: 25

## 2021-12-30 RX ORDER — IBUPROFEN 200 MG
400 TABLET ORAL ONCE
Refills: 0 | Status: COMPLETED | OUTPATIENT
Start: 2021-12-30 | End: 2021-12-30

## 2021-12-30 RX ORDER — IBUPROFEN 200 MG
600 TABLET ORAL ONCE
Refills: 0 | Status: COMPLETED | OUTPATIENT
Start: 2021-12-30 | End: 2021-12-30

## 2021-12-30 RX ADMIN — Medication 400 MILLIGRAM(S): at 16:09

## 2021-12-30 NOTE — ED PROVIDER NOTE - PATIENT PORTAL LINK FT
You can access the FollowMyHealth Patient Portal offered by [facility] by registering at the following website: [email or web address]. By joining [employer]’s FollowMyHealth portal, you will also be able to view your health information using other applications (apps) compatible with our system.

## 2021-12-30 NOTE — ED PROVIDER NOTE - PROGRESS NOTE DETAILS
Dipak Mackay: Pt seen by intake physician and HPI/ROS/PE/plan reviewed Confirmed and adjusted were appropriate.    PE: GEN: Awake, alert,  NAD,  EYES: PERRL CARDIAC: Reg rate and rhythm, S1,S2, RRR  RESP: No distress noted. Lungs CTA bilaterally no wheeze, ronchi, rales. ABD: soft,  non-tender, no guarding. . NEURO: AOx3, no focal deficits   PLAN: PT with stable VS, no acute distress, non toxic appearing, tolerating PO in the ED, Pt with peg tube pulled by [facility], cleared for dc home with follow up to [facility] clininc, educated about when to return to the ED if needed. PT verbalizes that he understands all instructions and results. Pt informed that ED is open and available 24/7 365 days a yr, encouraged to return to the ED if they have any change in condition, or feel the need for revaluation.    utilized to obtain History, ROS, Physical Exam, explanations of results and plan of care, as well as follow up instructions.

## 2021-12-30 NOTE — ED PROVIDER NOTE - NSFOLLOWUPCLINICS_GEN_ALL_ED_FT
[facility] Acute Care Surgery  Acute Care Surgery  [address] [place] [id number]  Phone: [phone]  Fax:

## 2021-12-30 NOTE — ED PROVIDER NOTE - CLINICAL SUMMARY MEDICAL DECISION MAKING FREE TEXT BOX
62 y/o male presents for PEG removal.  VSS  Abdomen soft nontender  PEG in place, difficult to remove.    Will get x-ray of abdomen and reassess.

## 2021-12-30 NOTE — ED PROVIDER NOTE - ADDITIONAL NOTES AND INSTRUCTIONS:
PT was evaluated At [facility] ED and was found to have a condition that warranted time of to rest and heal from WORK/SCHOOL.   Dipak Rios PA-C

## 2021-12-30 NOTE — ED PROVIDER NOTE - NSFOLLOWUPINSTRUCTIONS_ED_ALL_ED_FT
Extracción del tubo de gastrostomía endoscópica percutánea (PEG), cuidados posteriores    PEG Tube Removal, Care After      Esta hoja le mariola información sobre cómo cuidarse después del procedimiento. Gutierrez médico también podrá darle instrucciones más específicas. Comuníquese con el médico si tiene problemas o preguntas.      ¿Qué puedo esperar después del procedimiento?  Después del procedimiento, es normal tener los siguientes síntomas:  •Lizabeth leve molestia en la abertura en la piel por donde se extrajo el tubo (lugar de extracción del tubo).      •Lizabeth pequeña cantidad de secreción de la abertura.        Siga estas instrucciones en gutierrez casa:    Cuidado del lugar de extracción del tubo   •Siga las indicaciones del médico acerca del cuidado del lugar de extracción del tubo. Asegúrase de hacer lo siguiente:  •Lávese las alona con agua y jabón ramone al menos 20 segundos antes y después de cambiarse la venda (vendaje). Use desinfectante para alona si no dispone de agua y jabón.      •Cambie el vendaje albaro se lo haya indicado el médico.      •Controle el lugar de extracción del tubo todos los días para detectar signos de infección. Preste atención a los siguientes signos:  •Enrojecimiento, hinchazón o dolor.      •Líquido o johanna.       •Calor.      •Pus o mal olor.        • No tome amy de inmersión, no nade ni use el jacuzzi hasta que el médico lo autorice. Pregúntele al médico si puede ducharse. Pawel vez solo le permitan darse amy de esponja.        Actividad      •Retome layla actividades normales según lo indicado por el médico. Pregúntele al médico qué actividades son seguras para usted.      • No levante ningún objeto que pese más de 10 libras (4.5 kg) o que supere el límite de peso que le hayan indicado, hasta que el médico le diga que puede hacerlo.      Instrucciones generales     •Usa los medicamentos de venta johnnie y los recetados solamente albaro te lo haya indicado el médico.      •Siga las instrucciones del médico respecto de las comidas y las bebidas.      •Cumpla con todas las visitas de seguimiento. Risingsun es importante.        Comuníquese con un médico si:    •Tienes fiebre.      •Tiene dolor en el abdomen.      •Tiene enrojecimiento, hinchazón o dolor alrededor del lugar de extracción del tubo.      •Le sale líquido o johanna del lugar de extracción del tubo.      •El lugar de extracción del tubo se siente caliente al tacto.      •Tiene pus o percibe mal olor que sale del lugar de extracción del tubo.      •Tiene náuseas o vómitos.        Solicite ayuda de inmediato si:    •Tiene sangrado persistente en el lugar de extracción del tubo.      •Siente un dolor intenso en el abdomen.      •No puede comer ni beber.        Resumen    •Siga las indicaciones del médico acerca del cuidado del lugar de extracción del tubo.      • No tome amy de inmersión, no nade ni use el jacuzzi hasta que el médico se lo autorice. Pregúntele al médico si puede ducharse. Pawel vez solo le permitan darse amy de esponja.      •Controle el lugar de extracción del tubo todos los días para detectar signos de infección.      •Retome layla actividades normales según lo por indicado el médico.      Esta información no tiene albaro fin reemplazar el consejo del médico. Asegúrese de hacerle al médico cualquier pregunta que tenga.

## 2021-12-30 NOTE — ED PROVIDER NOTE - OBJECTIVE STATEMENT
60 y/o male with a PMHx of PEG tube placed, presents to the ED for PEG tube removal. Pt states the PEG tube was placed at [facility] s/p fall from ladder on 10/17/21. Pt was at the rehab center today, they attempted to remove it but were unsuccessful. Pt notes abdominal pain since yesterday. Last used PEG tube 3 days ago. Last ate yesterday via PO. 60 y/o male with a PMHx of PEG tube placed, presents to the ED for PEG tube removal. Pt states the PEG tube was placed at [facility] s/p fall from ladder on 10/17/21. Pt was at the rehab center today, the dietitian attempted to remove it but were unsuccessful. Pt notes pain since attempted removal at site Last used PEG tube 3 days ago. has been eating PO x 3 since then. Last ate yesterday via PO.

## 2021-12-30 NOTE — ED PROVIDER NOTE - PHYSICAL EXAMINATION
Constitutional: Awake, Alert. NAD. Well appearing, well nourished. Cooperative. VSS.  HEAD: NC/AT; no signs of trauma   EYES: Conjunctiva and sclera are clear bilaterally. EOMI. No nystagmus. PERRL.  ENT: No rhinorrhea, normal pharynx, oropharynx patent, no tonsillar exudates or enlargement, MMM, no erythema, no drooling or stridor.   NECK: Supple, non-tender. No nuchal rigidity. FROM. No midline or paraspinal TTP.  CARDIOVASCULAR: RRR, Normal S1, S2. No audible murmurs. No chest wall ttp. Radial pulses +2 B/L.  RESPIRATORY: Speaking full sentences. Normal respiratory effort; breath sounds CTAB, No WRR. No accessory muscle use.   ABDOMEN: Soft; NTND. PEG in place.   EXTREMITIES: Full active ROM in all extremities; non-tender to palpation; distal pulses palpable and symmetric, no edema, no crepitus or step off.  SKIN: Warm, dry; good skin turgor, no apparent lesions or rashes, no ecchymosis, brisk capillary refill.  NEURO: AAOx3 follows commands. No facial droop. CN 2-12 grossly intact. No truncal ataxia. Steady gait. Muscle strength 5/5 UE and LE B/L. Sensation intact B/L. No dysmetria (good finger-to-nose). Constitutional: Awake, Alert. NAD. Well appearing, well nourished. Cooperative. VSS.  HEAD: NC/AT; no signs of trauma   EYES: Conjunctiva and sclera are clear bilaterally.    NECK: FROM.   CARDIOVASCULAR: RRR  RESPIRATORY: Speaking full sentences. Normal respiratory effort  ABDOMEN: Soft; NTND. PEG in place to LUQ. no leaking/bleeding  EXTREMITIES: Full active ROM in all extremities; non-tender to palpation; distal pulses palpable and symmetric, no edema, no crepitus or step off.  SKIN: Warm, dry; good skin turgor, no apparent lesions or rashes, no ecchymosis, brisk capillary refill.  NEURO: AAOx3 follows commands. No facial droop. CN 2-12 grossly intact. No truncal ataxia. Steady gait. Moving all extrem spont

## 2022-01-11 ENCOUNTER — APPOINTMENT (OUTPATIENT)
Dept: TRAUMA SURGERY | Facility: CLINIC | Age: 62
End: 2022-01-11
Payer: MEDICAID

## 2022-01-11 VITALS
OXYGEN SATURATION: 97 % | TEMPERATURE: 97.3 F | SYSTOLIC BLOOD PRESSURE: 116 MMHG | WEIGHT: 142 LBS | HEART RATE: 62 BPM | HEIGHT: 64.5 IN | BODY MASS INDEX: 23.95 KG/M2 | DIASTOLIC BLOOD PRESSURE: 76 MMHG | RESPIRATION RATE: 16 BRPM

## 2022-01-11 DIAGNOSIS — S52.502D UNSPECIFIED FRACTURE OF THE LOWER END OF LEFT RADIUS, SUBSEQUENT ENCOUNTER FOR CLOSED FRACTURE WITH ROUTINE HEALING: ICD-10-CM

## 2022-01-11 DIAGNOSIS — H53.8 OTHER VISUAL DISTURBANCES: ICD-10-CM

## 2022-01-11 DIAGNOSIS — S02.19XS OTHER FRACTURE OF BASE OF SKULL, SEQUELA: ICD-10-CM

## 2022-01-11 DIAGNOSIS — S52.501D UNSPECIFIED FRACTURE OF THE LOWER END OF RIGHT RADIUS, SUBSEQUENT ENCOUNTER FOR CLOSED FRACTURE WITH ROUTINE HEALING: ICD-10-CM

## 2022-01-11 DIAGNOSIS — S02.412S: ICD-10-CM

## 2022-01-11 PROCEDURE — 99212 OFFICE O/P EST SF 10 MIN: CPT

## 2022-01-11 NOTE — HISTORY OF PRESENT ILLNESS
[de-identified] : Injuries included:\par -left parietooccipital SDH and left Sylvian fissure SAH\par -sphenoid fractures involving left carotid canal (10/17 1252 CTA concerning for left internal carotid artery injury, but no BCVI seen on 10/19 0902 MRA)\par -s/p ORIF of frontal sinus and bilateral LeFort II fractures on 10/20/2021\par -s/p ORIF bilateral distal radius fractures on 10/21/2021\par -right posterior column / posterior wall acetabular fractures\par -posterior mediastinal hematoma without blunt thoracic aortic injury on CTA chest.\par \par He underwent percutaneous tracheostomy and PEG on 10/20/2021.\par The trach was removed by [place] [facility] on 12/17.\par His PEG was removed during a 12/30 ER visit.\par  [de-identified] : he followed with ortho on 12/14 and was made WBAT to RLE\par he continues to have blurry vision in the left eye since the accident. has not yet seen an ophthalmologist.\par \par he has a f/u appointment with Ines Webb (hand surgeon) on 1/24.\par he has a f/u appointment with Esther Cisneros (plastic surgery) scheduled in February.\par

## 2022-01-11 NOTE — PHYSICAL EXAM
[Alert] : alert [Oriented to Person] : oriented to person [Oriented to Place] : oriented to place [Oriented to Time] : oriented to time [Calm] : calm [de-identified] : appears well [de-identified] : right pupil 6 mm and briskly reactive. left pupil 3 mm and sluggish. EOMI. [de-identified] : some hypergranulation tissue at perc trach site which i treated with silver nitrate [de-identified] : no respiratory distress [de-identified] : soft / NT / ND\par PEG site clean and well healed. [de-identified] : normal gait [de-identified] : no pallor

## 2022-01-11 NOTE — REASON FOR VISIT
[Post Hospitalization] : a post hospitalization visit [FreeTextEntry1] : admitted 10/17-11/3/2021 for multiple injuries after a fall from a ladder

## 2022-01-25 ENCOUNTER — APPOINTMENT (OUTPATIENT)
Dept: TRAUMA SURGERY | Facility: CLINIC | Age: 62
End: 2022-01-25

## 2022-04-18 ENCOUNTER — APPOINTMENT (OUTPATIENT)
Dept: CARDIOLOGY | Facility: CLINIC | Age: 62
End: 2022-04-18
Payer: MEDICAID

## 2022-04-18 VITALS
RESPIRATION RATE: 14 BRPM | WEIGHT: 152 LBS | HEART RATE: 50 BPM | HEIGHT: 64 IN | SYSTOLIC BLOOD PRESSURE: 132 MMHG | BODY MASS INDEX: 25.95 KG/M2 | DIASTOLIC BLOOD PRESSURE: 72 MMHG

## 2022-04-18 DIAGNOSIS — R00.1 BRADYCARDIA, UNSPECIFIED: ICD-10-CM

## 2022-04-18 DIAGNOSIS — Z87.898 PERSONAL HISTORY OF OTHER SPECIFIED CONDITIONS: ICD-10-CM

## 2022-04-18 PROCEDURE — 93000 ELECTROCARDIOGRAM COMPLETE: CPT

## 2022-04-18 PROCEDURE — 99204 OFFICE O/P NEW MOD 45 MIN: CPT

## 2022-04-18 RX ORDER — PHENYTOIN 125 MG/5ML
125 SUSPENSION ORAL
Qty: 237 | Refills: 0 | Status: DISCONTINUED | COMMUNITY
Start: 2021-11-29 | End: 2022-04-18

## 2022-04-18 RX ORDER — LEVETIRACETAM 500 MG/1
500 TABLET, FILM COATED ORAL
Refills: 0 | Status: ACTIVE | COMMUNITY

## 2022-04-18 RX ORDER — ALBUTEROL SULFATE 90 UG/1
108 (90 BASE) INHALANT RESPIRATORY (INHALATION)
Qty: 7 | Refills: 0 | Status: DISCONTINUED | COMMUNITY
Start: 2021-11-03 | End: 2022-04-18

## 2022-04-18 RX ORDER — ENALAPRIL MALEATE 5 MG/1
5 TABLET ORAL
Qty: 28 | Refills: 0 | Status: DISCONTINUED | COMMUNITY
Start: 2021-12-06 | End: 2022-04-18

## 2022-04-18 RX ORDER — VALPROIC ACID 250 MG/5ML
250 SOLUTION ORAL
Qty: 150 | Refills: 0 | Status: DISCONTINUED | COMMUNITY
Start: 2021-11-29 | End: 2022-04-18

## 2022-04-18 RX ORDER — CHLORHEXIDINE GLUCONATE, 0.12% ORAL RINSE 1.2 MG/ML
0.12 SOLUTION DENTAL
Qty: 473 | Refills: 0 | Status: DISCONTINUED | COMMUNITY
Start: 2021-11-23 | End: 2022-04-18

## 2022-04-18 RX ORDER — QUETIAPINE FUMARATE 50 MG/1
50 TABLET ORAL
Qty: 28 | Refills: 0 | Status: DISCONTINUED | COMMUNITY
Start: 2021-11-10 | End: 2022-04-18

## 2022-04-18 RX ORDER — EXTENDED PHENYTOIN SODIUM 100 MG/1
100 CAPSULE ORAL
Qty: 60 | Refills: 0 | Status: DISCONTINUED | COMMUNITY
Start: 2021-11-19 | End: 2022-04-18

## 2022-04-18 RX ORDER — QUETIAPINE FUMARATE 25 MG/1
25 TABLET ORAL
Qty: 30 | Refills: 0 | Status: DISCONTINUED | COMMUNITY
Start: 2021-11-11 | End: 2022-04-18

## 2022-04-18 RX ORDER — OXYCODONE HYDROCHLORIDE 5 MG/5ML
5 SOLUTION ORAL
Qty: 200 | Refills: 0 | Status: DISCONTINUED | COMMUNITY
Start: 2021-11-03 | End: 2022-04-18

## 2022-04-18 RX ORDER — ASPIRIN ENTERIC COATED TABLETS 81 MG 81 MG/1
81 TABLET, DELAYED RELEASE ORAL
Qty: 30 | Refills: 0 | Status: DISCONTINUED | COMMUNITY
Start: 2021-07-30 | End: 2022-04-18

## 2022-04-18 RX ORDER — OMEPRAZOLE 40 MG/1
40 CAPSULE, DELAYED RELEASE ORAL
Qty: 28 | Refills: 0 | Status: DISCONTINUED | COMMUNITY
Start: 2021-12-07 | End: 2022-04-18

## 2022-04-18 RX ORDER — CHLORHEXIDINE GLUCONATE 4 %
325 (65 FE) LIQUID (ML) TOPICAL
Refills: 0 | Status: ACTIVE | COMMUNITY

## 2022-04-18 RX ORDER — ERGOCALCIFEROL 1.25 MG/1
1.25 MG CAPSULE, LIQUID FILLED ORAL
Qty: 4 | Refills: 0 | Status: DISCONTINUED | COMMUNITY
Start: 2021-11-19 | End: 2022-04-18

## 2022-04-18 RX ORDER — PANTOPRAZOLE 40 MG/1
40 TABLET, DELAYED RELEASE ORAL
Qty: 30 | Refills: 0 | Status: DISCONTINUED | COMMUNITY
Start: 2021-11-19 | End: 2022-04-18

## 2022-04-18 RX ORDER — ENOXAPARIN SODIUM 100 MG/ML
40 INJECTION SUBCUTANEOUS
Qty: 4 | Refills: 0 | Status: DISCONTINUED | COMMUNITY
Start: 2021-11-16 | End: 2022-04-18

## 2022-06-03 ENCOUNTER — APPOINTMENT (OUTPATIENT)
Dept: CARDIOLOGY | Facility: CLINIC | Age: 62
End: 2022-06-03
Payer: MEDICAID

## 2022-06-03 PROCEDURE — 93306 TTE W/DOPPLER COMPLETE: CPT

## 2022-06-03 PROCEDURE — 93015 CV STRESS TEST SUPVJ I&R: CPT

## 2022-06-15 NOTE — ED ADULT TRIAGE NOTE - NURSING HOMES
Health Maintenance Due   Topic Date Due   • Shingles Vaccine (1 of 2) Never done   • Traditional Medicare- Medicare Wellness Visit  12/15/2022   • Depression Screening  12/15/2022       Patient is due for topics listed above, he wishes to proceed with Depression Screening  and MWV (Medicare Wellness Visit), but is not proceeding with Immunization(s) COVID-19, Influenza and Shingles at this time. The following has occurred:        NOTE    CHIEF COMPLAINT:  Nilton Aj presents for his Subsequent Annual Medicare Wellness Visit.   His additional complaints or concerns are addressed below.      Patient is a 73 yo male with a remote history subarachnoid hemorrhage in 2013, PMH of hypertension, tubular adenoma, diverticulosis, hyperlipidemia, prediabetes, moderate to severe aortic stenosis.     History of moderate to severe aortic stenosis, following up with Cardiology.  He was seen by Dr. Alanis on 06/07/2022. Currently asymptomatic, denies any chest pain, palpitations, shortness of breath, headache, dizziness.  Patient denies any presyncopal episodes.      Patient does have history of elevated PSA.  Repeat PSA appears to be trending down.  Currently has nocturia x1.  Otherwise asymptomatic.  No decreased urinary stream.  No dysuria or hematuria.  Urinalysis appears to be negative.  Remote history nicotine dependence, quit in 1982.    History of hypertension, appears to be well controlled.  Currently on enalapril 20 mg daily and amlodipine 5 mg daily.    Recommend annual glaucoma screening.  Follows up with Dr. Gu.    Patient denies having any hearing impairment.    Colonoscopy performed in 2020, 3 year follow-up recommended due to tubular adenomas.  AAA screening negative in 2018.  Hepatitis-C screening completed.    Remote history of lumbar spine fusion surgery x2.  Currently denies having any back pain.  Not currently using any medications.    SH: Former bar owner, currently retired. He was in construction  in the past. Former Nicotine Dependence, last use in . Does consume 4 shots weekly and 1 beer weekly.     Patient Care Team:  Cortney Longoria MD as PCP - General (Family Medicine)  Quincy Alanis MD (Cardiovascular Disease)  Nathan Cheung MD (Ophthalmology)        Patient Active Problem List   Diagnosis   • Family history of premature CAD   • Hyperlipidemia   • HTN (hypertension)   • Subarachnoid hemorrhage (CMS/HCC)   • S/P lumbar fusion   • Polyp of colon   • Health care maintenance   • Pre-diabetes   • Controlled substance agreement signed   • Diverticulosis   • Aortic stenosis, moderate         Past Medical History:   Diagnosis Date   • Aortic stenosis, moderate 2020   • Cerebral infarction (CMS/HCC)    • Essential (primary) hypertension    • Other and unspecified hyperlipidemia    • Personal history of traumatic fracture     broken left leg   • Personal history of traumatic fracture     right ankle   • Personal history of traumatic fracture     broken fingers on right hand   • Subarachnoid hemorrhage (CMS/HCC) 2013    speech disturb, lt hemiparesis         Past Surgical History:   Procedure Laterality Date   • Back surgery      lumbar laminectomy   • Back surgery      lumbar fusion   • Colonoscopy ablate lesion  03/10/2020    Repeat in 3 years. Hx of Tubular Adenoma and Hyperplastic polyps.   • Colonoscopy remove lesions by snare  11/10/2004   • Echocardiogram  2021   • Service to gastroenterology  2012    colonoscopy; polypectomy         Social History     Tobacco Use   • Smoking status: Former Smoker     Packs/day: 1.00     Years: 13.00     Pack years: 13.00     Types: Cigarettes     Start date: 1969     Quit date: 1982     Years since quittin.4   • Smokeless tobacco: Never Used   Vaping Use   • Vaping Use: never used   Substance Use Topics   • Alcohol use: Yes     Alcohol/week: 15.0 standard drinks     Types: 15 Shots of liquor per week      Comment: rum and coke   • Drug use: No     Family History   Problem Relation Age of Onset   • Cancer, Lung Mother    • Stroke Father         91   • Heart disease Brother         50s; CABG   • Stroke Maternal Aunt    • Patient is unaware of any medical problems Daughter    • Patient is unaware of any medical problems Daughter          Current Outpatient Medications   Medication Sig Dispense Refill   • amLODIPine (NORVASC) 5 MG tablet Take 1 tablet by mouth daily. 90 tablet 1   • metoPROLOL succinate (TOPROL-XL) 50 MG 24 hr tablet Take 1 tablet by mouth daily. 90 tablet 1   • enalapril (VASOTEC) 20 MG tablet Take 1 tablet by mouth daily. 90 tablet 0   • Ascorbic Acid (VITAMIN C) 500 MG tablet Take 500 mg by mouth daily.     • GARLIC PO Take 1 capsule by mouth daily.       No current facility-administered medications for this visit.        The following items on the Medicare Health Risk Assessment were found to be positive            Vision and Hearing screens: Not performed  recommend glaucoma screening annually.  Plans to see Dr. Gu.  Patient denies any hearing impairment.  Whisper test is normal.    Advance Directive:   The patient has the following documents:  No Advance Directives on file. Patient offered documents. Senior Resource RN referral placed.    Cognitive/Functional Status: no evidence of cognitive dysfunction by direct observation    Opioid Review: Nilton is taking an opioid that I am prescribing.  Nilton shows stablization of pain. He reports no side effects.   reviewed, no concerns regarding Opioid Use Disorder or diversion. Last Urine Drug Screen result was consistent with prescribed medications. Risk of opioid therapy reviewed with the patient.      Opioid Risk Tool Total Score:   0  Score 0-3 = Low Risk, 4-7 = Moderate Risk, 8+ = High Risk      OUTPATIENT PROGRESS NOTE    Subjective   Chief Complaint Follow-up    HPI Please see above.     Medications  Medications were reviewed and updated  today.    Histories  I have personally reviewed and updated the patient's past medical, past surgical, family and social histories during today's visit.    Review of Systems   Constitutional: Negative for activity change, appetite change, chills, diaphoresis, fatigue, fever and unexpected weight change.   HENT: Negative for congestion, ear discharge, ear pain, facial swelling, postnasal drip, rhinorrhea, sinus pressure, sinus pain and trouble swallowing.    Eyes: Negative for pain, discharge, redness, itching and visual disturbance.   Respiratory: Negative for cough, shortness of breath and wheezing.    Cardiovascular: Negative for chest pain, palpitations and leg swelling.   Gastrointestinal: Negative for abdominal distention, abdominal pain, blood in stool, constipation, diarrhea, nausea and vomiting.   Endocrine: Negative for polydipsia, polyphagia and polyuria.   Genitourinary: Negative for difficulty urinating, dysuria, flank pain, frequency, hematuria and urgency.   Musculoskeletal: Negative for arthralgias, back pain, joint swelling, myalgias, neck pain and neck stiffness.   Skin: Negative for color change, pallor, rash and wound.   Allergic/Immunologic: Negative for environmental allergies and food allergies.   Neurological: Negative for dizziness, tremors, seizures, facial asymmetry, speech difficulty, weakness, light-headedness, numbness and headaches.   Psychiatric/Behavioral: Negative for agitation, behavioral problems, confusion, hallucinations and sleep disturbance. The patient is not nervous/anxious.        Objective   Visit Vitals  /70 (BP Location: LUE - Left upper extremity, Patient Position: Sitting, Cuff Size: Regular)   Pulse 62   Resp 18   Ht 5' 11\" (1.803 m)   Wt 93.4 kg (206 lb)   SpO2 95%   BMI 28.73 kg/m²     Physical Exam  Vitals and nursing note reviewed.   Constitutional:       General: He is not in acute distress.     Appearance: He is normal weight. He is not ill-appearing,  toxic-appearing or diaphoretic.   HENT:      Head: Normocephalic and atraumatic.      Right Ear: Tympanic membrane, ear canal and external ear normal. There is no impacted cerumen.      Left Ear: Tympanic membrane, ear canal and external ear normal. There is no impacted cerumen.      Nose: Nose normal. No congestion or rhinorrhea.      Mouth/Throat:      Mouth: Mucous membranes are moist.      Pharynx: Oropharynx is clear. No oropharyngeal exudate or posterior oropharyngeal erythema.   Eyes:      General:         Right eye: No discharge.         Left eye: No discharge.      Extraocular Movements: Extraocular movements intact.      Conjunctiva/sclera: Conjunctivae normal.      Pupils: Pupils are equal, round, and reactive to light.   Neck:      Vascular: No carotid bruit.   Cardiovascular:      Rate and Rhythm: Normal rate and regular rhythm.      Pulses: Normal pulses.      Heart sounds: Normal heart sounds. No murmur heard.  No gallop.    Pulmonary:      Effort: No respiratory distress.      Breath sounds: Normal breath sounds. No stridor. No wheezing, rhonchi or rales.   Abdominal:      General: Abdomen is flat. Bowel sounds are normal. There is no distension.      Palpations: Abdomen is soft. There is no mass.      Tenderness: There is no abdominal tenderness. There is no right CVA tenderness or left CVA tenderness.      Hernia: No hernia is present.   Musculoskeletal:         General: No swelling or tenderness. Normal range of motion.      Cervical back: Normal range of motion and neck supple. No rigidity or tenderness.      Right lower leg: No edema.      Left lower leg: No edema.   Lymphadenopathy:      Cervical: No cervical adenopathy.   Skin:     General: Skin is warm.      Capillary Refill: Capillary refill takes 2 to 3 seconds.   Neurological:      General: No focal deficit present.      Mental Status: He is alert.      Cranial Nerves: No cranial nerve deficit.      Sensory: No sensory deficit.    Psychiatric:         Mood and Affect: Mood normal.         Laboratory  I have reviewed the pertinent laboratory tests. These are the pertinent findings:    Lab Services on 06/11/2022   Component Date Value Ref Range Status   • Prostate Specific Antigen 06/11/2022 2.97  <=6.50 ng/mL Final   Office Visit on 06/07/2022   Component Date Value Ref Range Status   • Ventricular Rate EKG/Min (BPM) 06/07/2022 63   Final   • Atrial Rate (BPM) 06/07/2022 63   Final   • AZ-Interval (MSEC) 06/07/2022 178   Final   • QRS-Interval (MSEC) 06/07/2022 84   Final   • QT-Interval (MSEC) 06/07/2022 398   Final   • QTc 06/07/2022 407   Final   • P Axis (Degrees) 06/07/2022 41   Final   • R Axis (Degrees) 06/07/2022 6   Final   • T Axis (Degrees) 06/07/2022 3   Final   • REPORT TEXT 06/07/2022    Final                    Value:Sinus rhythm  with occasional  premature ventricular complexes  Abnormal ECG  Confirmed by STEVENSON CATHERINE MD ([id number]) on 6/8/2022 6:25:08 AM            Imaging  I have reviewed the pertinent imaging study reports. These are the pertinent findings:    Echo 02/28/2022  Normal left ventricular size, systolic function and wall thickness, with no regional wall motion abnormalities.  Left ventricular ejection fraction, 62 %.  Grade I/IV diastolic dysfunction (abnormal relaxation filling pattern), normal to mildly elevated filling pressures.  Moderate to severe aortic valve stenosis, mean gradient 27.9 mmHg, GRETCHEN 1.1 cm².  No aortic valve regurgitation.  Normal size aortic root and proximal ascending aorta.    Assessment & Plan   1. Medicare annual wellness visit, subsequent  Medicare health risk assessment was completed, reviewed and entered into the record. Patient does report having advanced directives, not currently on file, will refer to Senior resource RN for complete. Patient rates own health in the past 4 weeks as very good. Patient does do moderate strenuous exercise most of the time. Patient has not fallen 2 or  more times in the past year. Patient always takes his prescribed medications. He seldom has bodily pain, tiredness or fatigue, stress or overwhelmed, or anger and frustration. Is independent in ADLs and IADLs. Someone is available as much as he wants if needed or wanted. He is very confident he can control and manage most of his health problems.     He was a former smoker. Smoking cessation referral not needed.  He does drink alcohol 1 can of beer and 4 shots of liquor weekly.  He has not been bothered by little interest or pleasure in doing things or feeling down depressed and hopeless.     PPP was completed and a copy given to the patient. Colorectal cancer screening is up-to-date. Prostate cancer screening to continue.  Cardiovascular screening is UTD, hx of aortic valve stenosis. Diabetic screening is up-to-date. Vision and glaucoma screening with Dr. Gu is recommended.     Immunizations were reviewed and are up-to-date. We did discuss the new shingles vaccine.  Advanced directives have been completed. Cognitive ability was assessed and was normal.    Advanced Directives: Discussed  Vaccines: Tdap 06/04/2019 Shingrix discussed, patient will confirm coverage with insurance company Pneumococcal UTD COVID UTD Influenza UTD.  Colonoscopy:   03/10/2020, repeat in 3 years due to tubular adenoma.  PSA: PSA has trended down from 4.37>4.30>3.90>3.23>2.97.  Cholesterol: last fasting lipid panel was 12/17/2021  FBS:  110 on 12/17/2021  Glaucoma Scr: Recommended vision check.  Follow-up with Dr. Gu.  CT Lung: N/A.  Quit in 1982.  Hepatitis C: Negative screen, 03/17/2017  AAA Screen:  2018 Negative family history      2. Essential hypertension  - Stable. Continue present management.   - enalapril (VASOTEC) 20 MG tablet; Take 1 tablet by mouth daily.  Dispense: 90 tablet; Refill: 0    3. Hyperlipidemia, mixed    The 10-year ASCVD risk score ([place] CLARENCE Jr., et al., 2013) is: 29.9%    Values used to calculate the score:       Age: 74 years      Sex: Male      Is Non- : No      Diabetic: No      Tobacco smoker: No      Systolic Blood Pressure: 138 mmHg      Is BP treated: Yes      HDL Cholesterol: 39 mg/dL      Total Cholesterol: 185 mg/dL    - Patient encouraged to start cholesterol lowering medication, previously on Zocor.     4. Nonrheumatic aortic valve stenosis  - Per Echo, aortic valve stenosis, mean gradient 27.9 mmHg, GRETCHEN 1.1 cm².  - Follows up with Dr. Alanis. Asymptomatic.  - Continue Metoprolol Succinate 50 mg daily.     5. History of elevated PSA  - PSA has trended down from 4.37>4.30>3.90>3.23>2.97. Will continue to monitor in 6 months.   - PSA; Future    6. Polyp of colon, unspecified part of colon, unspecified type  - Colonoscopy done in 2020, plan to repeat in 3 years due to presence of tubular adenoma.     7. Advanced directives, counseling/discussion  - Referral placed for patient to have advanced directives completed.   - SERVICE TO SENIOR RESOURCE NURSE     8.  Impaired fasting glucose  - stable.  BGM of 110.  Will monitor.    Cortney Longoria MD  06/17/22     [facility]

## 2022-07-05 ENCOUNTER — APPOINTMENT (OUTPATIENT)
Dept: CARDIOLOGY | Facility: CLINIC | Age: 62
End: 2022-07-05

## 2022-07-05 DIAGNOSIS — R94.31 ABNORMAL ELECTROCARDIOGRAM [ECG] [EKG]: ICD-10-CM

## 2022-07-05 PROCEDURE — 93015 CV STRESS TEST SUPVJ I&R: CPT

## 2022-07-05 PROCEDURE — 78452 HT MUSCLE IMAGE SPECT MULT: CPT

## 2022-07-05 PROCEDURE — A9500: CPT

## 2022-07-18 ENCOUNTER — APPOINTMENT (OUTPATIENT)
Dept: CARDIOLOGY | Facility: CLINIC | Age: 62
End: 2022-07-18

## 2022-07-18 VITALS
DIASTOLIC BLOOD PRESSURE: 78 MMHG | BODY MASS INDEX: 27.14 KG/M2 | HEIGHT: 64 IN | HEART RATE: 52 BPM | WEIGHT: 159 LBS | RESPIRATION RATE: 14 BRPM | SYSTOLIC BLOOD PRESSURE: 140 MMHG

## 2022-07-18 DIAGNOSIS — R01.1 CARDIAC MURMUR, UNSPECIFIED: ICD-10-CM

## 2022-07-18 PROCEDURE — 99214 OFFICE O/P EST MOD 30 MIN: CPT | Mod: 25

## 2022-07-18 PROCEDURE — 93000 ELECTROCARDIOGRAM COMPLETE: CPT

## 2022-07-18 RX ORDER — ATORVASTATIN CALCIUM 20 MG/1
20 TABLET, FILM COATED ORAL
Qty: 1 | Refills: 3 | Status: ACTIVE | COMMUNITY
Start: 2021-12-06 | End: 1900-01-01

## 2022-07-19 NOTE — PHYSICAL EXAM
[Normal S1, S2] : normal S1, S2 [S4] : S4 [Clear Lung Fields] : clear lung fields [Normal Bowel Sounds] : normal bowel sounds [Normal Gait] : normal gait [No Edema] : no edema [No Rash] : no rash [Normal] : alert and oriented, normal memory [de-identified] : 2/6 PATRICK

## 2022-07-19 NOTE — HISTORY OF PRESENT ILLNESS
[FreeTextEntry1] : From a cardiac standpoint, Mr. Stanton feels well denying exertional chest pain, shortness of breath, or other cardiac symptoms.  He recently underwent a complete cardiac evaluation for assessment of a “heart murmur.”  \par \par
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
## 2022-07-19 NOTE — ASSESSMENT
[FreeTextEntry1] : 1. EKG today reveals sinus bradycardia at 52 bpm. First degree AV block. Nonspecific ST-T changes. \par \par 2. Noninvasive cardiac evaluation included 24-hour Holter monitoring, an echocardiogram, and two exercise stress tests. \par \par Holter monitor revealed normal sinus rhythm ranging between 46 bpm and 87 bpm with an average heart rate of 58 bpm.  One isolated PVC and 9 APCs were noted.  No other significant findings. \par \par Echocardiography revealed normal left ventricular chamber dimensions and wall motion with a preserved ejection fraction estimated between 60-65%. The left atrium and right-sided chambers reveal normal dimensions and function.  The aortic root revealed a normal diameter.  Mild dilatation of the ascending aorta was noted.  Mild aortic stenosis as well as moderate aortic insufficiency, mild tricuspid regurgitation, and trace mitral regurgitation were noted.  No other significant findings. \par \par Exercise stress testing was performed utilizing a Vance protocol.  Mr. Stanton exercised for approximately 9 minutes and obtained 89% of his predicted maximal heart rate.  At a peak workload of 142 bpm, 1 mm horizontal ST segment depressions were noted in Leads II, III, aVF, V5 and V6.  Despite these EKG changes Mr. Stanton did not experience chest pain, shortness of breath, palpitations, or significant arrhythmias.  It was suspected that this represented a false positive result and therefore the patient was advised to proceed with nuclear stress testing.  \par \par A nuclear stress test was performed and once again the patient was noted to have EKG changes with exercise suggesting ischemia.  SPECT myocardial perfusion imaging revealed a homogeneous uptake of isotope throughout the left ventricular myocardial mass.  No evidence of exercise induced reversible ischemia or fixed defects to suggest an antecedent infarct.  The gated portion of this evaluation revealed normal resting left ventricular systolic function.  \par \par The above-noted evaluation speaks against the presence of significant cardiac arrhythmia or coronary artery ischemia.  The patient does have mild valvular dysfunction and will undergo follow up echocardiography likely in one to two years. \par \par 3. Review of recent blood work revealed a total cholesterol of 222, HDL 68, TC/HDL ratio 3.3, , triglycerides 135.  The patient is advised on a strict low-fat / low-cholesterol diet. He will begin Atorvastatin therapy and undergo fasting blood work prior to follow up office visit in two months.   \par

## 2022-07-28 ENCOUNTER — EMERGENCY (EMERGENCY)
Facility: HOSPITAL | Age: 62
LOS: 1 days | Discharge: HOME | End: 2022-07-28
Attending: EMERGENCY MEDICINE
Payer: MEDICAID

## 2022-07-28 VITALS
HEART RATE: 65 BPM | SYSTOLIC BLOOD PRESSURE: 143 MMHG | WEIGHT: 156.09 LBS | HEIGHT: 63 IN | RESPIRATION RATE: 16 BRPM | DIASTOLIC BLOOD PRESSURE: 66 MMHG | TEMPERATURE: 98 F | OXYGEN SATURATION: 98 %

## 2022-07-28 PROCEDURE — 99283 EMERGENCY DEPT VISIT LOW MDM: CPT

## 2022-07-28 PROCEDURE — 73562 X-RAY EXAM OF KNEE 3: CPT | Mod: 26,RT

## 2022-07-28 PROCEDURE — 73562 X-RAY EXAM OF KNEE 3: CPT

## 2022-07-28 PROCEDURE — 99284 EMERGENCY DEPT VISIT MOD MDM: CPT

## 2022-07-28 PROCEDURE — 96372 THER/PROPH/DIAG INJ SC/IM: CPT

## 2022-07-28 RX ORDER — KETOROLAC TROMETHAMINE 30 MG/ML
30 SYRINGE (ML) INJECTION ONCE
Refills: 0 | Status: DISCONTINUED | OUTPATIENT
Start: 2022-07-28 | End: 2022-07-28

## 2022-07-28 RX ORDER — DEXAMETHASONE 0.5 MG/5ML
8 ELIXIR ORAL ONCE
Refills: 0 | Status: COMPLETED | OUTPATIENT
Start: 2022-07-28 | End: 2022-07-28

## 2022-07-28 RX ADMIN — Medication 30 MILLIGRAM(S): at 11:46

## 2022-07-28 RX ADMIN — Medication 8 MILLIGRAM(S): at 12:21

## 2022-07-28 NOTE — ED PROVIDER NOTE - CARE PROVIDER_API CALL
Hadley Lozoya)  Orthopaedic Surgery  [address] [place] [address]  Phone: [phone]  Fax: [phone]  Follow Up Time: 4-6 Days

## 2022-07-28 NOTE — ED PROVIDER NOTE - PHYSICAL EXAMINATION
Knee examination : No soft tissue swelling noted. Right /Knee No Ballottement signs noted. No deformity noted on examination. No anterior drawer/Posterior drawer sign noted. Negative Valgus/Varus test. Negative  Candice test .  Tenderness on palpation of knee.

## 2022-07-28 NOTE — ED PROVIDER NOTE - PATIENT PORTAL LINK FT
You can access the FollowMyHealth Patient Portal offered by [facility] by registering at the following website: [email or web address]. By joining [employer]’s FollowMyHealth portal, you will also be able to view your health information using other applications (apps) compatible with our system.

## 2022-07-28 NOTE — ED PROVIDER NOTE - PROGRESS NOTE DETAILS
X-ray result + moderate effusion and degeneration. Pt made aware of his results and D/C with pain medication. F/U with orthopedic as discussed.

## 2022-07-28 NOTE — ED PROVIDER NOTE - ATTENDING APP SHARED VISIT CONTRIBUTION OF CARE
knee pain   9 months ago fall onto knee  saw ortho and advised knee was alright  pe as doc  agree w care plan

## 2022-07-28 NOTE — ED ADULT TRIAGE NOTE - CHIEF COMPLAINT QUOTE
Pt c/o R knee injury 9 months ago and has been doing a lot of walking recently and states woke up with inflammation and pain to posterior R knee and lateral R knee, pt has on knee brace and is limping.

## 2022-07-28 NOTE — ED PROVIDER NOTE - CLINICAL SUMMARY MEDICAL DECISION MAKING FREE TEXT BOX
62 yr old M presented to ED for right knee pain x 3 days. Pt states that he sustained an injury to his right knee x 9 months ago and he have been experiencing recurrent pain with increase activities to his knee. Pt admitted to walking a lot over the last 2 days and driving for about 6-8 hrs daily for work. Examination + mild swelling to right knee and active decrease ROM due to pain. . Normal pulses present. X-ray + for effusion and degenerative changes. pt D/C and will F/U with Orthopedic as discussed,

## 2022-07-28 NOTE — ED PROVIDER NOTE - OBJECTIVE STATEMENT
62 yr old M presented to ED for right knee pain x 3 days. Pt states that he sustained an injury to his right knee x 9 months ago and he have been experiencing recurrent pain with increase activities to his knee. Pt admitted to walking a lot over the last 2 days and driving for about 6-8 hrs daily for work. Pt denies any numbness, weakness or paraesthesia but admits to swelling and pain with right lower extremity extension. Pt denies any other issues at this time. Pt also denies any allergy to food or medications.

## 2022-07-28 NOTE — ED PROVIDER NOTE - NSFOLLOWUPINSTRUCTIONS_ED_ALL_ED_FT
Continue with Medication as prescribed.   Followup with Orthopedic  Decrease high impact activities such as running , long distance walking and driving for an extended period of time.

## 2022-08-18 ENCOUNTER — APPOINTMENT (OUTPATIENT)
Dept: ORTHOPEDIC SURGERY | Facility: CLINIC | Age: 62
End: 2022-08-18

## 2022-08-18 VITALS
WEIGHT: 159 LBS | DIASTOLIC BLOOD PRESSURE: 68 MMHG | HEART RATE: 56 BPM | HEIGHT: 64 IN | BODY MASS INDEX: 27.14 KG/M2 | SYSTOLIC BLOOD PRESSURE: 151 MMHG

## 2022-08-18 DIAGNOSIS — M17.0 BILATERAL PRIMARY OSTEOARTHRITIS OF KNEE: ICD-10-CM

## 2022-08-18 PROCEDURE — 20610 DRAIN/INJ JOINT/BURSA W/O US: CPT | Mod: RT

## 2022-08-18 PROCEDURE — 73564 X-RAY EXAM KNEE 4 OR MORE: CPT | Mod: 50

## 2022-08-18 PROCEDURE — 99214 OFFICE O/P EST MOD 30 MIN: CPT | Mod: 25

## 2022-08-18 RX ORDER — MELOXICAM 15 MG/1
15 TABLET ORAL
Qty: 30 | Refills: 0 | Status: ACTIVE | COMMUNITY
Start: 2022-08-18 | End: 1900-01-01

## 2022-08-18 NOTE — PROCEDURE
[de-identified] : I injected his right knee.\par I discussed at length with the patient the planned steroid and lidocaine injection. The risks, benefits, convalescence and alternatives were reviewed. The possible side effects discussed included but were not limited to: pain, swelling, heat, bleeding, and redness. Symptoms are generally mild but if they are extensive then contact the office. Giving pain relievers by mouth such as NSAIDs or Tylenol can generally treat the reactions to steroid and lidocaine. Rare cases of infection have been noted. Rash, hives and itching may occur post injection. If you have muscle pain or cramps, flushing and or swelling of the face, rapid heart beat, nausea, dizziness, fever, chills, headache, difficulty breathing, swelling in the arms or legs, or have a prickly feeling of your skin, contact a health care provider immediately. Following this discussion, the knee was prepped with Alcohol and under sterile condition the 80 mg Depo-Medrol and 6 cc Lidocaine injection was performed with a 20 gauge needle through a superolateral injection site. The needle was introduced into the joint, aspiration was performed to ensure intra-articular placement and the medication was injected. Upon withdrawal of the needle the site was cleaned with alcohol and a band aid applied. The patient tolerated the injection well and there were no adverse effects. Post injection instructions included no strenuous activity for 24 hours, cryotherapy and if there are any adverse effects to contact the office.\par

## 2022-08-18 NOTE — PHYSICAL EXAM
[de-identified] : Insert bileGENERAL APPEARANCE: Well nourished and hydrated, pleasant, alert, and oriented x 3. Appears their stated age. \par HEENT: Normocephalic, extraocular eye motion intact. Nasal septum midline. Oral cavity clear. External auditory canal clear. \par RESPIRATORY: Breath sounds clear and audible in all lobes. No wheezing, No accessory muscle use.\par CARDIOVASCULAR: No apparent abnormalities. No lower leg edema. No varicosities. Pedal pulses are palpable.\par NEUROLOGIC: Sensation is normal, no muscle weakness in the upper or lower extremities.\par DERMATOLOGIC: No apparent skin lesions, moist, warm, no rash.\par SPINE: Cervical spine appears normal and moves freely; thoracic spine appears normal and moves freely; lumbosacral spine appears normal and moves freely, normal, nontender.\par MUSCULOSKELETAL: Hands, wrists, and elbows are normal and move freely, shoulders are normal and move freely. \par Psychiatric: Oriented to person, place, and time, insight and judgement were intact and the affect was normal. \par Musculoskeletal:. Left knee exam shows mild effusion, ROM is old 0-1 25 degrees, no instability, no pain with Venita, medial joint line tenderness. \par Right knee exam shows mild effusion, ROM is 0-1 25 degrees, no instability, no pain with Venita, medial joint line tenderness. \par 5/5 motor strength in bilateral lower extremities. Sensory: Intact in bilateral lower extremities. DTRs: Biceps, brachioradialis, triceps, patellar, ankle and plantar 2+ and symmetric bilaterally. Pulses: dorsalis pedis, posterior tibial, femoral, popliteal, and radial 2+ and symmetric bilaterally. \par  [de-identified] : 4 views of bilateral knees obtained the office today show no acute fracture or dislocation.  There is mild medial joint space narrowing bilaterally as well as osteophyte formation of the posterior aspect of the tibia most pronounced on the lateral view of the right knee consistent with Kellgren-Hai grade 1 changes.

## 2022-08-18 NOTE — HISTORY OF PRESENT ILLNESS
[de-identified] : Patient is a 62-year-old male here today for evaluation of right worse than left knee pain has been going on for some time.  Patient states that for the last few months has been having increasing pain in the knee.  States he gets intermittent swelling.  Had to go to the emergency room recently due to the pain in the knee.  States he was given medication that helped with the pain.  Has been noticing it getting worse.  States that he has difficulty with navigating stairs or rising reseated position.  Hurts mainly over the medial aspect of the knee.  States that the left knee does ache sometimes but however is not very painful.  Has not been taking any pain medication for this other than was prescribed by the emergency room.  Has never been to physical therapy.  Denies any locking popping or buckling.

## 2022-08-18 NOTE — DISCUSSION/SUMMARY
[Medication Risks Reviewed] : Medication risks reviewed [Surgical risks reviewed] : Surgical risks reviewed [de-identified] : Patient is a 62-year-old male with mild bilateral knee osteoarthritis presenting today for initial evaluation.  His left knee is relatively asymptomatic and he is having mild symptoms in his right knee at this time.  I therefore recommended conservative treatment this time.  I gave him injection of cortisone which he tolerated well.  I recommended physical therapy.  I given a prescription for meloxicam 15 mg daily as needed for pain.  I will see him back in 6 weeks for repeat evaluation.  All questions were asked and answered

## 2022-09-18 ENCOUNTER — RESULT CHARGE (OUTPATIENT)
Age: 62
End: 2022-09-18

## 2022-09-19 ENCOUNTER — APPOINTMENT (OUTPATIENT)
Dept: CARDIOLOGY | Facility: CLINIC | Age: 62
End: 2022-09-19

## 2022-09-19 VITALS
DIASTOLIC BLOOD PRESSURE: 72 MMHG | SYSTOLIC BLOOD PRESSURE: 118 MMHG | WEIGHT: 158 LBS | BODY MASS INDEX: 26.98 KG/M2 | HEART RATE: 53 BPM | HEIGHT: 64 IN | RESPIRATION RATE: 14 BRPM

## 2022-09-19 DIAGNOSIS — S06.6X9A TRAUMATIC SUBARACHNOID HEMORRHAGE WITH LOSS OF CONSCIOUSNESS OF UNSPECIFIED DURATION, INITIAL ENCOUNTER: ICD-10-CM

## 2022-09-19 DIAGNOSIS — I44.0 ATRIOVENTRICULAR BLOCK, FIRST DEGREE: ICD-10-CM

## 2022-09-19 DIAGNOSIS — S06.5X9A TRAUMATIC SUBDURAL HEMORRHAGE WITH LOSS OF CONSCIOUSNESS OF UNSPECIFIED DURATION, INITIAL ENCOUNTER: ICD-10-CM

## 2022-09-19 DIAGNOSIS — E78.00 PURE HYPERCHOLESTEROLEMIA, UNSPECIFIED: ICD-10-CM

## 2022-09-19 PROCEDURE — 93000 ELECTROCARDIOGRAM COMPLETE: CPT

## 2022-09-19 PROCEDURE — 99214 OFFICE O/P EST MOD 30 MIN: CPT | Mod: 25

## 2022-09-24 PROBLEM — S06.5X9A TRAUMATIC SUBDURAL HEMATOMA: Status: ACTIVE | Noted: 2022-01-11

## 2022-09-24 PROBLEM — E78.00 HIGH CHOLESTEROL: Status: ACTIVE | Noted: 2022-04-18

## 2022-09-24 PROBLEM — S06.6X9A TRAUMATIC SUBARACHNOID HEMORRHAGE: Status: ACTIVE | Noted: 2022-01-11

## 2022-09-24 PROBLEM — I44.0 FIRST DEGREE AV BLOCK: Status: ACTIVE | Noted: 2022-04-18

## 2022-09-27 NOTE — ASSESSMENT
[FreeTextEntry1] : 1.  EKG today reveals sinus bradycardia at 53 bpm.  First degree AV block.  No evidence of ischemia.   \par \par 2.  Hyperlipidemia:  Review of recent bloodwork reveals a total cholesterol of 140, HDL 67, LDL 61, triglycerides 59.  Patient advised to continue current statin therapy as well as follow a strict low-fat / low-cholesterol diet.  \par \par 3.  Given his current stability, the patient is advised on the above as well as a regular walking program.  If clinically stable, office visit six months.

## 2022-09-27 NOTE — PHYSICAL EXAM
[Normal S1, S2] : normal S1, S2 [S4] : S4 [Clear Lung Fields] : clear lung fields [Normal Bowel Sounds] : normal bowel sounds [Normal Gait] : normal gait [No Edema] : no edema [No Rash] : no rash [Normal] : alert and oriented, normal memory [de-identified] : 2/6 PATRICK
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
## 2022-10-19 ENCOUNTER — APPOINTMENT (OUTPATIENT)
Dept: ORTHOPEDIC SURGERY | Facility: CLINIC | Age: 62
End: 2022-10-19

## 2022-11-30 PROBLEM — R94.31 ABNORMAL EKG: Status: ACTIVE | Noted: 2022-11-30

## 2022-11-30 RX ORDER — KIT FOR THE PREPARATION OF TECHNETIUM TC99M SESTAMIBI 1 MG/5ML
INJECTION, POWDER, LYOPHILIZED, FOR SOLUTION PARENTERAL
Refills: 0 | Status: COMPLETED | OUTPATIENT
Start: 2022-11-30

## 2022-11-30 RX ADMIN — KIT FOR THE PREPARATION OF TECHNETIUM TC99M SESTAMIBI 0: 1 INJECTION, POWDER, LYOPHILIZED, FOR SOLUTION PARENTERAL at 00:00

## 2023-08-30 ENCOUNTER — OUTPATIENT (OUTPATIENT)
Dept: OUTPATIENT SERVICES | Facility: HOSPITAL | Age: 63
LOS: 1 days | End: 2023-08-30
Payer: MEDICAID

## 2023-08-30 ENCOUNTER — APPOINTMENT (OUTPATIENT)
Dept: RADIOLOGY | Facility: CLINIC | Age: 63
End: 2023-08-30
Payer: MEDICAID

## 2023-08-30 DIAGNOSIS — Z13.820 ENCOUNTER FOR SCREENING FOR OSTEOPOROSIS: ICD-10-CM

## 2023-08-30 PROCEDURE — 77080 DXA BONE DENSITY AXIAL: CPT | Mod: 26

## 2023-08-30 PROCEDURE — 77080 DXA BONE DENSITY AXIAL: CPT

## 2023-12-01 NOTE — OCCUPATIONAL THERAPY INITIAL EVALUATION ADULT - LEVEL OF INDEPENDENCE: CHAIR TO BED, REHAB EVAL
use of mechanical lift recommended at this time.  Ongoing functional assessment to follow./unable to perform Alert and oriented to person, place and time

## 2025-07-15 NOTE — ED PROVIDER NOTE - DISPOSITION TYPE
"Please see \"Imaging\" tab under \"Chart Review\" for details of today's visit.    Renita Turner    "
DISCHARGE